# Patient Record
Sex: FEMALE | Race: WHITE | NOT HISPANIC OR LATINO | Employment: FULL TIME | URBAN - METROPOLITAN AREA
[De-identification: names, ages, dates, MRNs, and addresses within clinical notes are randomized per-mention and may not be internally consistent; named-entity substitution may affect disease eponyms.]

---

## 2023-03-12 PROBLEM — E66.811 CLASS 1 OBESITY WITH BODY MASS INDEX (BMI) OF 32.0 TO 32.9 IN ADULT: Status: ACTIVE | Noted: 2023-03-12

## 2023-03-12 PROBLEM — G43.909 MIGRAINE: Status: ACTIVE | Noted: 2023-03-12

## 2023-03-12 PROBLEM — S06.0X0A CONCUSSION WITH NO LOSS OF CONSCIOUSNESS: Status: ACTIVE | Noted: 2023-03-12

## 2023-03-12 PROBLEM — F41.9 ANXIETY: Status: ACTIVE | Noted: 2023-03-12

## 2023-03-12 PROBLEM — Z04.9 CONDITION NOT FOUND: Status: ACTIVE | Noted: 2023-03-12

## 2023-03-12 PROBLEM — E66.09 CLASS 1 OBESITY DUE TO EXCESS CALORIES WITHOUT SERIOUS COMORBIDITY WITH BODY MASS INDEX (BMI) OF 32.0 TO 32.9 IN ADULT: Status: ACTIVE | Noted: 2023-03-12

## 2023-03-12 PROBLEM — E66.811 CLASS 1 OBESITY DUE TO EXCESS CALORIES WITHOUT SERIOUS COMORBIDITY WITH BODY MASS INDEX (BMI) OF 32.0 TO 32.9 IN ADULT: Status: ACTIVE | Noted: 2023-03-12

## 2023-03-12 PROBLEM — E53.8 B12 DEFICIENCY: Status: ACTIVE | Noted: 2023-03-12

## 2023-03-12 PROBLEM — N95.9 POSTMENOPAUSAL SYMPTOMS: Status: ACTIVE | Noted: 2023-03-12

## 2023-03-12 PROBLEM — K21.9 GERD (GASTROESOPHAGEAL REFLUX DISEASE): Status: ACTIVE | Noted: 2023-03-12

## 2023-03-12 PROBLEM — R69 SICK: Status: ACTIVE | Noted: 2023-03-12

## 2023-03-12 PROBLEM — M54.2 NECK PAIN: Status: ACTIVE | Noted: 2023-03-12

## 2023-03-12 PROBLEM — E55.9 VITAMIN D DEFICIENCY: Status: ACTIVE | Noted: 2023-03-12

## 2023-03-12 PROBLEM — F90.0 ATTENTION DEFICIT HYPERACTIVITY DISORDER (ADHD), PREDOMINANTLY INATTENTIVE TYPE: Status: ACTIVE | Noted: 2023-03-12

## 2023-03-12 PROBLEM — M25.562 LEFT KNEE PAIN: Status: ACTIVE | Noted: 2023-03-12

## 2023-03-12 PROBLEM — R30.0 DYSURIA: Status: ACTIVE | Noted: 2023-03-12

## 2023-03-12 PROBLEM — L70.0 CLOSED COMEDONE: Status: ACTIVE | Noted: 2023-03-12

## 2023-03-12 PROBLEM — R41.3 MEMORY LOSS: Status: ACTIVE | Noted: 2023-03-12

## 2023-03-12 PROBLEM — G93.40 ENCEPHALOPATHY: Status: ACTIVE | Noted: 2023-03-12

## 2023-03-12 PROBLEM — R53.83 FATIGUE: Status: ACTIVE | Noted: 2023-03-12

## 2023-03-12 PROBLEM — M43.6 STIFFNESS OF CERVICAL SPINE: Status: ACTIVE | Noted: 2023-03-12

## 2023-03-12 PROBLEM — M22.2X2 PATELLOFEMORAL PAIN SYNDROME OF LEFT KNEE: Status: ACTIVE | Noted: 2023-03-12

## 2023-03-12 PROBLEM — E04.1 THYROID NODULE: Status: ACTIVE | Noted: 2023-03-12

## 2023-03-12 PROBLEM — B00.9 HERPES SIMPLEX: Status: ACTIVE | Noted: 2023-03-12

## 2023-03-12 PROBLEM — E66.9 CLASS 1 OBESITY WITH BODY MASS INDEX (BMI) OF 32.0 TO 32.9 IN ADULT: Status: ACTIVE | Noted: 2023-03-12

## 2023-03-12 RX ORDER — HYDROCHLOROTHIAZIDE 25 MG/1
1 TABLET ORAL DAILY
COMMUNITY
End: 2024-01-29 | Stop reason: SDUPTHER

## 2023-03-12 RX ORDER — ACYCLOVIR 400 MG/1
400 TABLET ORAL
COMMUNITY
Start: 2019-09-26 | End: 2024-04-13 | Stop reason: SDUPTHER

## 2023-03-12 RX ORDER — SEMAGLUTIDE 1 MG/.5ML
1 INJECTION, SOLUTION SUBCUTANEOUS
COMMUNITY
End: 2023-03-22 | Stop reason: DRUGHIGH

## 2023-03-12 RX ORDER — MELOXICAM 15 MG/1
15 TABLET ORAL DAILY PRN
COMMUNITY
End: 2023-09-19 | Stop reason: SDUPTHER

## 2023-03-12 RX ORDER — SEMAGLUTIDE 0.5 MG/.5ML
0.5 INJECTION, SOLUTION SUBCUTANEOUS
COMMUNITY
End: 2023-03-22 | Stop reason: DRUGHIGH

## 2023-03-12 RX ORDER — NEEDLES, SAFETY 18GX1 1/2"
NEEDLE, DISPOSABLE MISCELLANEOUS
COMMUNITY
End: 2024-01-29 | Stop reason: SDUPTHER

## 2023-03-12 RX ORDER — ACYCLOVIR 50 MG/G
1 OINTMENT TOPICAL 4 TIMES DAILY PRN
COMMUNITY
Start: 2021-06-04 | End: 2023-06-12

## 2023-03-12 RX ORDER — PANTOPRAZOLE SODIUM 20 MG/1
1 TABLET, DELAYED RELEASE ORAL DAILY
COMMUNITY
Start: 2021-09-07 | End: 2024-01-29 | Stop reason: SDUPTHER

## 2023-03-12 RX ORDER — ERGOCALCIFEROL 1.25 MG/1
1 CAPSULE ORAL
COMMUNITY
Start: 2022-04-19 | End: 2023-09-19 | Stop reason: ALTCHOICE

## 2023-03-21 LAB
C REACTIVE PROTEIN (MG/L) IN SER/PLAS: 0.56 MG/DL
CALCIDIOL (25 OH VITAMIN D3) (NG/ML) IN SER/PLAS: 33 NG/ML
COBALAMIN (VITAMIN B12) (PG/ML) IN SER/PLAS: 434 PG/ML (ref 211–911)

## 2023-03-22 ENCOUNTER — OFFICE VISIT (OUTPATIENT)
Dept: PRIMARY CARE | Facility: CLINIC | Age: 53
End: 2023-03-22
Payer: COMMERCIAL

## 2023-03-22 VITALS
OXYGEN SATURATION: 99 % | DIASTOLIC BLOOD PRESSURE: 85 MMHG | WEIGHT: 192.4 LBS | SYSTOLIC BLOOD PRESSURE: 127 MMHG | HEIGHT: 67 IN | TEMPERATURE: 97.1 F | HEART RATE: 68 BPM | RESPIRATION RATE: 16 BRPM | BODY MASS INDEX: 30.2 KG/M2

## 2023-03-22 DIAGNOSIS — E66.09 CLASS 1 OBESITY DUE TO EXCESS CALORIES WITHOUT SERIOUS COMORBIDITY WITH BODY MASS INDEX (BMI) OF 32.0 TO 32.9 IN ADULT: ICD-10-CM

## 2023-03-22 DIAGNOSIS — E53.8 B12 DEFICIENCY: Primary | ICD-10-CM

## 2023-03-22 DIAGNOSIS — E55.9 VITAMIN D DEFICIENCY: ICD-10-CM

## 2023-03-22 PROBLEM — R69 SICK: Status: RESOLVED | Noted: 2023-03-12 | Resolved: 2023-03-22

## 2023-03-22 PROBLEM — R53.83 FATIGUE: Status: RESOLVED | Noted: 2023-03-12 | Resolved: 2023-03-22

## 2023-03-22 PROBLEM — E66.9 CLASS 1 OBESITY WITH BODY MASS INDEX (BMI) OF 32.0 TO 32.9 IN ADULT: Status: RESOLVED | Noted: 2023-03-12 | Resolved: 2023-03-22

## 2023-03-22 PROBLEM — L70.0 CLOSED COMEDONE: Status: RESOLVED | Noted: 2023-03-12 | Resolved: 2023-03-22

## 2023-03-22 PROBLEM — R30.0 DYSURIA: Status: RESOLVED | Noted: 2023-03-12 | Resolved: 2023-03-22

## 2023-03-22 PROBLEM — Z04.9 CONDITION NOT FOUND: Status: RESOLVED | Noted: 2023-03-12 | Resolved: 2023-03-22

## 2023-03-22 PROBLEM — E66.811 CLASS 1 OBESITY WITH BODY MASS INDEX (BMI) OF 32.0 TO 32.9 IN ADULT: Status: RESOLVED | Noted: 2023-03-12 | Resolved: 2023-03-22

## 2023-03-22 PROCEDURE — 96372 THER/PROPH/DIAG INJ SC/IM: CPT | Performed by: FAMILY MEDICINE

## 2023-03-22 PROCEDURE — 3008F BODY MASS INDEX DOCD: CPT | Performed by: FAMILY MEDICINE

## 2023-03-22 PROCEDURE — 99214 OFFICE O/P EST MOD 30 MIN: CPT | Performed by: FAMILY MEDICINE

## 2023-03-22 PROCEDURE — 1036F TOBACCO NON-USER: CPT | Performed by: FAMILY MEDICINE

## 2023-03-22 RX ORDER — FLUCONAZOLE 150 MG/1
150 TABLET ORAL ONCE
Qty: 1 TABLET | Refills: 0 | Status: SHIPPED | OUTPATIENT
Start: 2023-03-22 | End: 2023-03-22

## 2023-03-22 RX ORDER — SEMAGLUTIDE 1 MG/.5ML
1 INJECTION, SOLUTION SUBCUTANEOUS
Qty: 6 ML | Refills: 3 | Status: SHIPPED | OUTPATIENT
Start: 2023-03-22 | End: 2023-08-01 | Stop reason: DRUGHIGH

## 2023-03-22 RX ORDER — CYANOCOBALAMIN 1000 UG/ML
1000 INJECTION, SOLUTION INTRAMUSCULAR; SUBCUTANEOUS ONCE
Status: COMPLETED | OUTPATIENT
Start: 2023-03-22 | End: 2023-03-22

## 2023-03-22 RX ADMIN — CYANOCOBALAMIN 1000 MCG: 1000 INJECTION, SOLUTION INTRAMUSCULAR; SUBCUTANEOUS at 15:13

## 2023-03-22 ASSESSMENT — ENCOUNTER SYMPTOMS
CONFUSION: 0
DIZZINESS: 0
NECK PAIN: 0
ARTHRALGIAS: 0
SORE THROAT: 0
DYSURIA: 0
SPEECH DIFFICULTY: 0
FREQUENCY: 0
SHORTNESS OF BREATH: 0
SINUS PRESSURE: 0
BACK PAIN: 0
ADENOPATHY: 0
DIARRHEA: 0
CHOKING: 0
PALPITATIONS: 0
JOINT SWELLING: 0
POLYDIPSIA: 0
FATIGUE: 0
WHEEZING: 0
BLOOD IN STOOL: 0
EYE ITCHING: 0
HEADACHES: 0
CHEST TIGHTNESS: 0
EYE DISCHARGE: 0
ACTIVITY CHANGE: 0
HALLUCINATIONS: 0
FACIAL ASYMMETRY: 0
ABDOMINAL PAIN: 0
FLANK PAIN: 0
EYE REDNESS: 0
MYALGIAS: 0
NUMBNESS: 0
NECK STIFFNESS: 0
EYE PAIN: 0
LIGHT-HEADEDNESS: 0
TREMORS: 0
NAUSEA: 0
UNEXPECTED WEIGHT CHANGE: 0
ABDOMINAL DISTENTION: 0
SLEEP DISTURBANCE: 0
VOMITING: 0
SEIZURES: 0
RHINORRHEA: 0
FEVER: 0
WEAKNESS: 0
WOUND: 0
NERVOUS/ANXIOUS: 0
AGITATION: 0
PHOTOPHOBIA: 0
TROUBLE SWALLOWING: 0
CHILLS: 0
DYSPHORIC MOOD: 0
COUGH: 0
BRUISES/BLEEDS EASILY: 0
HEMATURIA: 0
APPETITE CHANGE: 0
DIAPHORESIS: 0
VOICE CHANGE: 0
CONSTIPATION: 0

## 2023-03-22 NOTE — PROGRESS NOTES
Subjective   Patient ID: Chyna Lorenzo is a 52 y.o. female who presents for Follow-up (Review labs ).    Patient comes into the office today for follow-up on her vitamin labs.  She has had documented vitamin D and B12 deficiencies causing issues with memory/forgetfulness/sluggishness etc.  She reports symptomatology is improving and she has been maintaining vitamin D orally and B12 injections as recommended.  She would also like a refill for her Wegovy.  She would also like to have some Diflucan on hand for yeast infections.  She denies any issues with nausea, vomiting, diarrhea, fever, chills, shortness of breath or chest pain.         Review of Systems   Constitutional:  Negative for activity change, appetite change, chills, diaphoresis, fatigue, fever and unexpected weight change.   HENT:  Negative for congestion, ear pain, hearing loss, nosebleeds, postnasal drip, rhinorrhea, sinus pressure, sneezing, sore throat, tinnitus, trouble swallowing and voice change.    Eyes:  Negative for photophobia, pain, discharge, redness, itching and visual disturbance.   Respiratory:  Negative for cough, choking, chest tightness, shortness of breath and wheezing.    Cardiovascular:  Negative for chest pain, palpitations and leg swelling.   Gastrointestinal:  Negative for abdominal distention, abdominal pain, blood in stool, constipation, diarrhea, nausea and vomiting.   Endocrine: Negative for cold intolerance, heat intolerance, polydipsia and polyuria.   Genitourinary:  Negative for dysuria, flank pain, frequency, hematuria and urgency.   Musculoskeletal:  Negative for arthralgias, back pain, joint swelling, myalgias, neck pain and neck stiffness.   Skin:  Negative for rash and wound.   Allergic/Immunologic: Negative for immunocompromised state.   Neurological:  Negative for dizziness, tremors, seizures, syncope, facial asymmetry, speech difficulty, weakness, light-headedness, numbness and headaches.   Hematological:  Negative  "for adenopathy. Does not bruise/bleed easily.   Psychiatric/Behavioral:  Negative for agitation, behavioral problems, confusion, dysphoric mood, hallucinations, self-injury, sleep disturbance and suicidal ideas. The patient is not nervous/anxious.        Objective   /85 (BP Location: Left arm, Patient Position: Sitting)   Pulse 68   Temp 36.2 °C (97.1 °F)   Resp 16   Ht 1.702 m (5' 7\")   Wt 87.3 kg (192 lb 6.4 oz)   SpO2 99%   BMI 30.13 kg/m²     Physical Exam  Constitutional:       General: She is not in acute distress.     Appearance: She is not ill-appearing or diaphoretic.   HENT:      Head: Normocephalic and atraumatic.      Right Ear: External ear normal.      Left Ear: External ear normal.      Nose: Nose normal. No rhinorrhea.   Eyes:      General: Lids are normal. No scleral icterus.        Right eye: No discharge.         Left eye: No discharge.      Conjunctiva/sclera: Conjunctivae normal.   Cardiovascular:      Rate and Rhythm: Normal rate and regular rhythm.      Pulses: Normal pulses.      Heart sounds: No murmur heard.  Pulmonary:      Effort: Pulmonary effort is normal. No respiratory distress.      Breath sounds: No decreased breath sounds, wheezing, rhonchi or rales.   Abdominal:      General: Bowel sounds are normal. There is no distension.      Palpations: Abdomen is soft. There is no mass.      Tenderness: There is no abdominal tenderness. There is no guarding or rebound.   Musculoskeletal:         General: No swelling, tenderness or deformity.      Cervical back: No rigidity or tenderness.      Right lower leg: No edema.      Left lower leg: No edema.   Lymphadenopathy:      Cervical: No cervical adenopathy.      Upper Body:      Right upper body: No supraclavicular adenopathy.      Left upper body: No supraclavicular adenopathy.   Skin:     General: Skin is warm and dry.      Coloration: Skin is not jaundiced or pale.      Findings: No erythema, lesion or rash.   Neurological:     "  General: No focal deficit present.      Mental Status: She is alert and oriented to person, place, and time.      Sensory: No sensory deficit.      Motor: No weakness or tremor.      Coordination: Coordination normal.      Gait: Gait normal.   Psychiatric:         Mood and Affect: Mood normal. Affect is not inappropriate.         Behavior: Behavior normal.         Assessment/Plan   Diagnoses and all orders for this visit:  B12 deficiency  -     cyanocobalamin (Vitamin B-12) injection 1,000 mcg  -     CBC and Auto Differential; Future  -     Vitamin B12; Future  -     C-Reactive Protein; Future  -     Follow Up In [facility] - PCP; Future  Class 1 obesity due to excess calories without serious comorbidity with body mass index (BMI) of 32.0 to 32.9 in adult  -     semaglutide, weight loss, (Wegovy) 1 mg/0.5 mL pen injector; Inject 1 mg under the skin every 7 days.  -     CBC and Auto Differential; Future  -     Comprehensive Metabolic Panel; Future  -     Lipid Panel; Future  -     Magnesium; Future  -     TSH with reflex to Free T4 if abnormal; Future  -     Follow Up In [facility] - PCP; Future  -     fluconazole (Diflucan) 150 mg tablet; Take 1 tablet (150 mg) by mouth 1 time for 1 dose.  Vitamin D deficiency  -     Comprehensive Metabolic Panel; Future  -     Vitamin D, Total; Future  -     Follow Up In [facility] - PCP; Future

## 2023-04-17 ENCOUNTER — TELEPHONE (OUTPATIENT)
Dept: PRIMARY CARE | Facility: CLINIC | Age: 53
End: 2023-04-17
Payer: COMMERCIAL

## 2023-04-19 ENCOUNTER — TELEPHONE (OUTPATIENT)
Dept: PHARMACY | Facility: HOSPITAL | Age: 53
End: 2023-04-19
Payer: COMMERCIAL

## 2023-04-19 NOTE — TELEPHONE ENCOUNTER
Prior Authorization    Prior authorization approved for [employer] to notify of approval. Patient is to call pharmacy to notify of prior authorization approval. If additional assistance is needed, advised patient to call [facility] at [phone].    Please reach out to clinical pharmacy team with questions/concerns.    Thank you,  Lorenza Orellana, PharmD

## 2023-06-11 DIAGNOSIS — B00.9 HERPES SIMPLEX: Primary | ICD-10-CM

## 2023-06-12 RX ORDER — ACYCLOVIR 50 MG/G
OINTMENT TOPICAL
Qty: 20 G | Refills: 0 | Status: SHIPPED | OUTPATIENT
Start: 2023-06-12

## 2023-08-01 ENCOUNTER — TELEPHONE (OUTPATIENT)
Dept: PRIMARY CARE | Facility: CLINIC | Age: 53
End: 2023-08-01
Payer: COMMERCIAL

## 2023-08-01 DIAGNOSIS — E66.09 CLASS 1 OBESITY DUE TO EXCESS CALORIES WITHOUT SERIOUS COMORBIDITY WITH BODY MASS INDEX (BMI) OF 30.0 TO 30.9 IN ADULT: Primary | ICD-10-CM

## 2023-08-01 RX ORDER — SEMAGLUTIDE 1.7 MG/.75ML
1.7 INJECTION, SOLUTION SUBCUTANEOUS
Qty: 3 ML | Refills: 5 | Status: SHIPPED | OUTPATIENT
Start: 2023-08-01 | End: 2023-09-19 | Stop reason: SDUPTHER

## 2023-09-18 ENCOUNTER — LAB (OUTPATIENT)
Dept: LAB | Facility: LAB | Age: 53
End: 2023-09-18
Payer: COMMERCIAL

## 2023-09-18 DIAGNOSIS — E66.09 CLASS 1 OBESITY DUE TO EXCESS CALORIES WITHOUT SERIOUS COMORBIDITY WITH BODY MASS INDEX (BMI) OF 32.0 TO 32.9 IN ADULT: ICD-10-CM

## 2023-09-18 DIAGNOSIS — E55.9 VITAMIN D DEFICIENCY: ICD-10-CM

## 2023-09-18 DIAGNOSIS — E53.8 B12 DEFICIENCY: ICD-10-CM

## 2023-09-18 LAB
ALANINE AMINOTRANSFERASE (SGPT) (U/L) IN SER/PLAS: 16 U/L (ref 7–45)
ALBUMIN (G/DL) IN SER/PLAS: 4.2 G/DL (ref 3.4–5)
ALKALINE PHOSPHATASE (U/L) IN SER/PLAS: 45 U/L (ref 33–110)
ANION GAP IN SER/PLAS: 13 MMOL/L (ref 10–20)
ASPARTATE AMINOTRANSFERASE (SGOT) (U/L) IN SER/PLAS: 19 U/L (ref 9–39)
BASOPHILS (10*3/UL) IN BLOOD BY AUTOMATED COUNT: 0.04 X10E9/L (ref 0–0.1)
BASOPHILS/100 LEUKOCYTES IN BLOOD BY AUTOMATED COUNT: 0.8 % (ref 0–2)
BILIRUBIN TOTAL (MG/DL) IN SER/PLAS: 0.3 MG/DL (ref 0–1.2)
C REACTIVE PROTEIN (MG/L) IN SER/PLAS: 0.51 MG/DL
CALCIDIOL (25 OH VITAMIN D3) (NG/ML) IN SER/PLAS: 41 NG/ML
CALCIUM (MG/DL) IN SER/PLAS: 9.6 MG/DL (ref 8.6–10.3)
CARBON DIOXIDE, TOTAL (MMOL/L) IN SER/PLAS: 29 MMOL/L (ref 21–32)
CHLORIDE (MMOL/L) IN SER/PLAS: 103 MMOL/L (ref 98–107)
CHOLESTEROL (MG/DL) IN SER/PLAS: 198 MG/DL (ref 0–199)
CHOLESTEROL IN HDL (MG/DL) IN SER/PLAS: 47.6 MG/DL
CHOLESTEROL/HDL RATIO: 4.2
COBALAMIN (VITAMIN B12) (PG/ML) IN SER/PLAS: 344 PG/ML (ref 211–911)
CREATININE (MG/DL) IN SER/PLAS: 0.79 MG/DL (ref 0.5–1.05)
EOSINOPHILS (10*3/UL) IN BLOOD BY AUTOMATED COUNT: 0.09 X10E9/L (ref 0–0.7)
EOSINOPHILS/100 LEUKOCYTES IN BLOOD BY AUTOMATED COUNT: 1.8 % (ref 0–6)
ERYTHROCYTE DISTRIBUTION WIDTH (RATIO) BY AUTOMATED COUNT: 12.4 % (ref 11.5–14.5)
ERYTHROCYTE MEAN CORPUSCULAR HEMOGLOBIN CONCENTRATION (G/DL) BY AUTOMATED: 33 G/DL (ref 32–36)
ERYTHROCYTE MEAN CORPUSCULAR VOLUME (FL) BY AUTOMATED COUNT: 93 FL (ref 80–100)
ERYTHROCYTES (10*6/UL) IN BLOOD BY AUTOMATED COUNT: 4.1 X10E12/L (ref 4–5.2)
GFR FEMALE: 89 ML/MIN/1.73M2
GLUCOSE (MG/DL) IN SER/PLAS: 85 MG/DL (ref 74–99)
HEMATOCRIT (%) IN BLOOD BY AUTOMATED COUNT: 38.2 % (ref 36–46)
HEMOGLOBIN (G/DL) IN BLOOD: 12.6 G/DL (ref 12–16)
IMMATURE GRANULOCYTES/100 LEUKOCYTES IN BLOOD BY AUTOMATED COUNT: 0.2 % (ref 0–0.9)
LDL: 130 MG/DL (ref 0–99)
LEUKOCYTES (10*3/UL) IN BLOOD BY AUTOMATED COUNT: 5.1 X10E9/L (ref 4.4–11.3)
LYMPHOCYTES (10*3/UL) IN BLOOD BY AUTOMATED COUNT: 2.22 X10E9/L (ref 1.2–4.8)
LYMPHOCYTES/100 LEUKOCYTES IN BLOOD BY AUTOMATED COUNT: 43.6 % (ref 13–44)
MAGNESIUM (MG/DL) IN SER/PLAS: 1.78 MG/DL (ref 1.6–2.4)
MONOCYTES (10*3/UL) IN BLOOD BY AUTOMATED COUNT: 0.42 X10E9/L (ref 0.1–1)
MONOCYTES/100 LEUKOCYTES IN BLOOD BY AUTOMATED COUNT: 8.3 % (ref 2–10)
NEUTROPHILS (10*3/UL) IN BLOOD BY AUTOMATED COUNT: 2.31 X10E9/L (ref 1.2–7.7)
NEUTROPHILS/100 LEUKOCYTES IN BLOOD BY AUTOMATED COUNT: 45.3 % (ref 40–80)
PLATELETS (10*3/UL) IN BLOOD AUTOMATED COUNT: 296 X10E9/L (ref 150–450)
POTASSIUM (MMOL/L) IN SER/PLAS: 4 MMOL/L (ref 3.5–5.3)
PROTEIN TOTAL: 6.3 G/DL (ref 6.4–8.2)
SODIUM (MMOL/L) IN SER/PLAS: 141 MMOL/L (ref 136–145)
THYROTROPIN (MIU/L) IN SER/PLAS BY DETECTION LIMIT <= 0.05 MIU/L: 2.67 MIU/L (ref 0.44–3.98)
TRIGLYCERIDE (MG/DL) IN SER/PLAS: 101 MG/DL (ref 0–149)
UREA NITROGEN (MG/DL) IN SER/PLAS: 15 MG/DL (ref 6–23)
VLDL: 20 MG/DL (ref 0–40)

## 2023-09-18 PROCEDURE — 83735 ASSAY OF MAGNESIUM: CPT

## 2023-09-18 PROCEDURE — 86140 C-REACTIVE PROTEIN: CPT

## 2023-09-18 PROCEDURE — 82306 VITAMIN D 25 HYDROXY: CPT

## 2023-09-18 PROCEDURE — 36415 COLL VENOUS BLD VENIPUNCTURE: CPT

## 2023-09-18 PROCEDURE — 80061 LIPID PANEL: CPT

## 2023-09-18 PROCEDURE — 85025 COMPLETE CBC W/AUTO DIFF WBC: CPT

## 2023-09-18 PROCEDURE — 80053 COMPREHEN METABOLIC PANEL: CPT

## 2023-09-18 PROCEDURE — 82607 VITAMIN B-12: CPT

## 2023-09-18 PROCEDURE — 84443 ASSAY THYROID STIM HORMONE: CPT

## 2023-09-19 ENCOUNTER — OFFICE VISIT (OUTPATIENT)
Dept: PRIMARY CARE | Facility: CLINIC | Age: 53
End: 2023-09-19
Payer: COMMERCIAL

## 2023-09-19 VITALS
BODY MASS INDEX: 26.84 KG/M2 | RESPIRATION RATE: 18 BRPM | OXYGEN SATURATION: 98 % | WEIGHT: 171 LBS | SYSTOLIC BLOOD PRESSURE: 106 MMHG | TEMPERATURE: 97.9 F | HEIGHT: 67 IN | HEART RATE: 73 BPM | DIASTOLIC BLOOD PRESSURE: 73 MMHG

## 2023-09-19 DIAGNOSIS — E53.8 B12 DEFICIENCY: ICD-10-CM

## 2023-09-19 DIAGNOSIS — Z23 ENCOUNTER FOR IMMUNIZATION: ICD-10-CM

## 2023-09-19 DIAGNOSIS — M79.672 FOOT PAIN, BILATERAL: ICD-10-CM

## 2023-09-19 DIAGNOSIS — N95.1 MENOPAUSAL VASOMOTOR SYNDROME: ICD-10-CM

## 2023-09-19 DIAGNOSIS — Z12.31 ENCOUNTER FOR SCREENING MAMMOGRAM FOR MALIGNANT NEOPLASM OF BREAST: ICD-10-CM

## 2023-09-19 DIAGNOSIS — M79.671 FOOT PAIN, BILATERAL: ICD-10-CM

## 2023-09-19 DIAGNOSIS — M22.2X2 PATELLOFEMORAL PAIN SYNDROME OF LEFT KNEE: Primary | ICD-10-CM

## 2023-09-19 DIAGNOSIS — E55.9 VITAMIN D DEFICIENCY: ICD-10-CM

## 2023-09-19 DIAGNOSIS — E66.3 OVERWEIGHT WITH BODY MASS INDEX (BMI) OF 26 TO 26.9 IN ADULT: ICD-10-CM

## 2023-09-19 DIAGNOSIS — K58.1 IRRITABLE BOWEL SYNDROME WITH CONSTIPATION: ICD-10-CM

## 2023-09-19 PROCEDURE — 90686 IIV4 VACC NO PRSV 0.5 ML IM: CPT | Performed by: FAMILY MEDICINE

## 2023-09-19 PROCEDURE — 90471 IMMUNIZATION ADMIN: CPT | Performed by: FAMILY MEDICINE

## 2023-09-19 PROCEDURE — 1036F TOBACCO NON-USER: CPT | Performed by: FAMILY MEDICINE

## 2023-09-19 PROCEDURE — 99396 PREV VISIT EST AGE 40-64: CPT | Performed by: FAMILY MEDICINE

## 2023-09-19 PROCEDURE — 3008F BODY MASS INDEX DOCD: CPT | Performed by: FAMILY MEDICINE

## 2023-09-19 RX ORDER — SEMAGLUTIDE 1.7 MG/.75ML
1.7 INJECTION, SOLUTION SUBCUTANEOUS
Qty: 9 ML | Refills: 3 | Status: SHIPPED | OUTPATIENT
Start: 2023-09-19 | End: 2023-10-20 | Stop reason: DRUGHIGH

## 2023-09-19 RX ORDER — LANOLIN ALCOHOL/MO/W.PET/CERES
400 CREAM (GRAM) TOPICAL DAILY
Qty: 30 TABLET | Refills: 11 | Status: SHIPPED | OUTPATIENT
Start: 2023-09-19 | End: 2024-09-18

## 2023-09-19 RX ORDER — CHOLECALCIFEROL (VITAMIN D3) 25 MCG
25 TABLET ORAL DAILY
COMMUNITY

## 2023-09-19 RX ORDER — CYANOCOBALAMIN 1000 UG/ML
1000 INJECTION, SOLUTION INTRAMUSCULAR; SUBCUTANEOUS ONCE
Status: SHIPPED | OUTPATIENT
Start: 2023-09-19

## 2023-09-19 RX ORDER — MELOXICAM 15 MG/1
15 TABLET ORAL DAILY PRN
Qty: 90 TABLET | Refills: 3 | Status: SHIPPED | OUTPATIENT
Start: 2023-09-19 | End: 2024-01-29 | Stop reason: SDUPTHER

## 2023-09-19 ASSESSMENT — ENCOUNTER SYMPTOMS
FREQUENCY: 0
ABDOMINAL DISTENTION: 0
ABDOMINAL PAIN: 0
CONFUSION: 0
PALPITATIONS: 0
ACTIVITY CHANGE: 0
FEVER: 0
PHOTOPHOBIA: 0
CONSTIPATION: 0
TROUBLE SWALLOWING: 0
WHEEZING: 0
CHILLS: 0
DIARRHEA: 0
SEIZURES: 0
CHEST TIGHTNESS: 0
ADENOPATHY: 0
VOMITING: 0
APPETITE CHANGE: 0
BRUISES/BLEEDS EASILY: 0
DIAPHORESIS: 0
WEAKNESS: 0
BACK PAIN: 0
DYSPHORIC MOOD: 0
NERVOUS/ANXIOUS: 0
BLOOD IN STOOL: 0
SLEEP DISTURBANCE: 0
VOICE CHANGE: 0
NAUSEA: 0
EYE REDNESS: 0
HEADACHES: 0
EYE ITCHING: 0
HEMATURIA: 0
RHINORRHEA: 0
UNEXPECTED WEIGHT CHANGE: 0
COUGH: 0
DIZZINESS: 0
POLYDIPSIA: 0
FACIAL ASYMMETRY: 0
DYSURIA: 0
NECK PAIN: 0
SORE THROAT: 0
FLANK PAIN: 0
EYE DISCHARGE: 0
CHOKING: 0
MYALGIAS: 0
WOUND: 0
SHORTNESS OF BREATH: 0
FATIGUE: 0
AGITATION: 0
EYE PAIN: 0
TREMORS: 0
NUMBNESS: 0
ARTHRALGIAS: 1
JOINT SWELLING: 0
SINUS PRESSURE: 0
SPEECH DIFFICULTY: 0
HALLUCINATIONS: 0
NECK STIFFNESS: 0
LIGHT-HEADEDNESS: 0

## 2023-09-19 ASSESSMENT — PATIENT HEALTH QUESTIONNAIRE - PHQ9
1. LITTLE INTEREST OR PLEASURE IN DOING THINGS: NOT AT ALL
SUM OF ALL RESPONSES TO PHQ9 QUESTIONS 1 AND 2: 0
2. FEELING DOWN, DEPRESSED OR HOPELESS: NOT AT ALL

## 2023-09-19 NOTE — PATIENT INSTRUCTIONS
BMI was above normal measurement. Current weight: 77.6 kg (171 lb)  Weight change since last visit (-) denotes wt loss -21.4 lbs   Weight loss needed to achieve BMI 25: 11.7 Lbs  Weight loss needed to achieve BMI 30: -20.1 Lbs  Provided instructions on dietary changes  Provided instructions on exercise  Advised to Increase physical activity.

## 2023-09-19 NOTE — PROGRESS NOTES
Subjective   Patient ID: Chyna Lorenzo is a 53 y.o. female who presents for Annual Exam (And review labs ).    Subjective  Chyna Lorenzo is a 53 y.o. female and is here for a comprehensive physical exam. The patient reports complaining of postmenopausal hot flashes.  Is wondering if she can get estrogen replacement filled.  She was getting through her gynecologist who is only given her a few patches at a time.  She has been doing very well with weight loss with the semaglutide would like to maintain the same dose is not having any problems to monitor her B12 and D deficiency.  She needs refills on medication for her IBS, weight loss, knee pain and foot pain.  Is also due for flu vaccine and would like a referral to podiatry as she cannot afford to purchase custom billed shoe inserts on her own.    Do you take any herbs or supplements that were not prescribed by a doctor? no  Are you taking calcium supplements? no  Are you taking aspirin daily? no      History:  LMP: No LMP recorded. Patient is postmenopausal.            Review of Systems   Constitutional:  Negative for activity change, appetite change, chills, diaphoresis, fatigue, fever and unexpected weight change.   HENT:  Negative for congestion, ear pain, hearing loss, nosebleeds, postnasal drip, rhinorrhea, sinus pressure, sneezing, sore throat, tinnitus, trouble swallowing and voice change.    Eyes:  Negative for photophobia, pain, discharge, redness, itching and visual disturbance.   Respiratory:  Negative for cough, choking, chest tightness, shortness of breath and wheezing.    Cardiovascular:  Negative for chest pain, palpitations and leg swelling.   Gastrointestinal:  Negative for abdominal distention, abdominal pain, blood in stool, constipation, diarrhea, nausea and vomiting.   Endocrine: Negative for cold intolerance, heat intolerance, polydipsia and polyuria.   Genitourinary:  Negative for dysuria, flank pain, frequency, hematuria and urgency.  "  Musculoskeletal:  Positive for arthralgias. Negative for back pain, joint swelling, myalgias, neck pain and neck stiffness.        Bilateral foot pain   Skin:  Negative for rash and wound.   Allergic/Immunologic: Negative for immunocompromised state.   Neurological:  Negative for dizziness, tremors, seizures, syncope, facial asymmetry, speech difficulty, weakness, light-headedness, numbness and headaches.   Hematological:  Negative for adenopathy. Does not bruise/bleed easily.   Psychiatric/Behavioral:  Negative for agitation, behavioral problems, confusion, dysphoric mood, hallucinations, self-injury, sleep disturbance and suicidal ideas. The patient is not nervous/anxious.        Objective   /73 (BP Location: Right arm, Patient Position: Sitting, BP Cuff Size: Large adult)   Pulse 73   Temp 36.6 °C (97.9 °F) (Temporal)   Resp 18   Ht 1.702 m (5' 7\")   Wt 77.6 kg (171 lb)   SpO2 98%   BMI 26.78 kg/m²     Physical Exam  Constitutional:       General: She is not in acute distress.     Appearance: She is not ill-appearing or diaphoretic.   HENT:      Head: Normocephalic and atraumatic.      Right Ear: External ear normal.      Left Ear: External ear normal.      Nose: Nose normal. No rhinorrhea.   Eyes:      General: Lids are normal. No scleral icterus.        Right eye: No discharge.         Left eye: No discharge.      Conjunctiva/sclera: Conjunctivae normal.   Cardiovascular:      Rate and Rhythm: Normal rate and regular rhythm.      Pulses: Normal pulses.      Heart sounds: No murmur heard.  Pulmonary:      Effort: Pulmonary effort is normal. No respiratory distress.      Breath sounds: No decreased breath sounds, wheezing, rhonchi or rales.   Abdominal:      General: Bowel sounds are normal. There is no distension.      Palpations: Abdomen is soft. There is no mass.      Tenderness: There is no abdominal tenderness. There is no guarding or rebound.   Musculoskeletal:         General: No swelling, " tenderness or deformity.      Cervical back: No rigidity or tenderness.      Right lower leg: No edema.      Left lower leg: No edema.   Lymphadenopathy:      Cervical: No cervical adenopathy.      Upper Body:      Right upper body: No supraclavicular adenopathy.      Left upper body: No supraclavicular adenopathy.   Skin:     General: Skin is warm and dry.      Coloration: Skin is not jaundiced or pale.      Findings: No erythema, lesion or rash.   Neurological:      General: No focal deficit present.      Mental Status: She is alert and oriented to person, place, and time.      Sensory: No sensory deficit.      Motor: No weakness or tremor.      Coordination: Coordination normal.      Gait: Gait normal.   Psychiatric:         Mood and Affect: Mood normal. Affect is not inappropriate.         Behavior: Behavior normal.         Assessment/Plan   Diagnoses and all orders for this visit:  Patellofemoral pain syndrome of left knee  -     meloxicam (Mobic) 15 mg tablet; Take 1 tablet (15 mg) by mouth once daily as needed for mild pain (1 - 3).  -     Follow Up In [facility] - PCP - Established; Future  B12 deficiency  -     Follow Up In [facility] - PCP  -     Follow Up In [facility] - PCP - Established; Future  -     CBC and Auto Differential; Future  -     TSH with reflex to Free T4 if abnormal; Future  -     Vitamin B12; Future  -     cyanocobalamin (Vitamin B-12) injection 1,000 mcg  Vitamin D deficiency  -     Follow Up In [facility] - PCP  -     Follow Up In [facility] - PCP - Established; Future  -     Comprehensive Metabolic Panel; Future  -     Magnesium; Future  -     TSH with reflex to Free T4 if abnormal; Future  -     Vitamin D 25-Hydroxy,Total (for eval of Vitamin D levels); Future  Irritable bowel syndrome with constipation  -     linaCLOtide (Linzess) 145 mcg capsule; Take 1 capsule (145 mcg) by mouth once daily.  -     magnesium oxide (Mag-Ox) 400  mg (241.3 mg magnesium) tablet; Take 1 tablet (400 mg) by mouth once daily.  -     Follow Up In [facility] - PCP - Established; Future  Encounter for screening mammogram for malignant neoplasm of breast  -     BI mammo bilateral screening tomosynthesis; Future  -     Follow Up In [facility] - PCP - Established; Future  Encounter for immunization  -     Flu vaccine (IIV4) age 6 months and greater, preservative free  -     Follow Up In [facility] - PCP - Established; Future  Overweight with body mass index (BMI) of 26 to 26.9 in adult  -     semaglutide, weight loss, (Wegovy) 1.7 mg/0.75 mL pen injector; Inject 1.7 mg under the skin 1 (one) time per week.  -     Follow Up In [facility] - PCP - Established; Future  Menopausal vasomotor syndrome  -     Follow Up In [facility] - PCP - Established; Future  -     CBC and Auto Differential; Future  -     Comprehensive Metabolic Panel; Future  -     Lipid Panel; Future  -     Magnesium; Future  -     TSH with reflex to Free T4 if abnormal; Future  -     fezolinetant 45 mg tablet; Take 45 mg by mouth once daily.  Foot pain, bilateral  -     Referral to Podiatry; Future  1. Patient Counseling:  --Nutrition: Stressed importance of moderation in sodium/caffeine intake, saturated fat and cholesterol, caloric balance, sufficient intake of fresh fruits, vegetables, fiber, calcium, iron,.  --Exercise: Stressed the importance of regular exercise.   --Substance Abuse: Discussed cessation/primary prevention of tobacco, alcohol, or other drug use; driving or other dangerous activities under the influence; availability of treatment for abuse.   --Injury prevention: Discussed safety belts, safety helmets, smoke detector, smoking near bedding or upholstery.   --Dental health: Discussed importance of regular tooth brushing, flossing, and dental visits.  --Immunizations reviewed.  --Discussed benefits of screening colonoscopy.  Will be  due for repeat in 2024  3. Discussed the patient's BMI with her.  The BMI is in the acceptable range.  4. Follow up 1 year with labs

## 2023-10-03 ENCOUNTER — ANCILLARY PROCEDURE (OUTPATIENT)
Dept: RADIOLOGY | Facility: CLINIC | Age: 53
End: 2023-10-03
Payer: COMMERCIAL

## 2023-10-03 DIAGNOSIS — Z12.31 ENCOUNTER FOR SCREENING MAMMOGRAM FOR MALIGNANT NEOPLASM OF BREAST: ICD-10-CM

## 2023-10-03 PROCEDURE — 77063 BREAST TOMOSYNTHESIS BI: CPT

## 2023-10-03 PROCEDURE — 77063 BREAST TOMOSYNTHESIS BI: CPT | Mod: BILATERAL PROCEDURE | Performed by: RADIOLOGY

## 2023-10-03 PROCEDURE — 77067 SCR MAMMO BI INCL CAD: CPT | Mod: BILATERAL PROCEDURE | Performed by: RADIOLOGY

## 2023-10-12 ENCOUNTER — TELEPHONE (OUTPATIENT)
Dept: PRIMARY CARE | Facility: CLINIC | Age: 53
End: 2023-10-12
Payer: COMMERCIAL

## 2023-10-12 NOTE — TELEPHONE ENCOUNTER
Patient was not able to get Veozah; must try and fail 2 preferred hormone therapy products:  Estradiol gel, transdermal patch or oral tablet, Mimvey, Amabelz etc. (See scanned document in Epic for additional aletrnatives)    Patient reports having tried Estradiol patch with no benefits

## 2023-10-18 DIAGNOSIS — E66.3 OVERWEIGHT WITH BODY MASS INDEX (BMI) OF 26 TO 26.9 IN ADULT: ICD-10-CM

## 2023-10-20 RX ORDER — SEMAGLUTIDE 1.7 MG/.75ML
2.4 INJECTION, SOLUTION SUBCUTANEOUS
Qty: 0.75 ML | Refills: 3 | Status: CANCELLED | OUTPATIENT
Start: 2023-10-20 | End: 2024-10-19

## 2023-10-27 ENCOUNTER — TELEPHONE (OUTPATIENT)
Dept: PRIMARY CARE | Facility: CLINIC | Age: 53
End: 2023-10-27
Payer: COMMERCIAL

## 2023-10-30 ENCOUNTER — OFFICE VISIT (OUTPATIENT)
Dept: ORTHOPEDIC SURGERY | Facility: CLINIC | Age: 53
End: 2023-10-30
Payer: COMMERCIAL

## 2023-10-30 DIAGNOSIS — M17.0 PRIMARY OSTEOARTHRITIS OF BOTH KNEES: Primary | ICD-10-CM

## 2023-10-30 PROCEDURE — 99213 OFFICE O/P EST LOW 20 MIN: CPT | Performed by: ORTHOPAEDIC SURGERY

## 2023-10-30 PROCEDURE — 3008F BODY MASS INDEX DOCD: CPT | Performed by: ORTHOPAEDIC SURGERY

## 2023-10-30 PROCEDURE — 1036F TOBACCO NON-USER: CPT | Performed by: ORTHOPAEDIC SURGERY

## 2023-10-30 NOTE — PROGRESS NOTES
History of present illness: Patient with a history of severe patellofemoral knee arthritis she had x-rays in August 3, 2023 bone-on-bone osteophyte formation complete loss of the joint space her arthritis is uniquely and significantly patellofemoral    Physical exam:    General: No acute distress or breathing difficulty or discomfort, pleasant and cooperative with the examination.    Extremities: The affected left knee was examined and inspected and was tender to touch along the medial and lateral aspect with catching, locking or mechanical symptoms.    The skin was intact without breakdown or open wound.  Old incisions of present were healed.    There was a mild Jeffrey exam seen with some evidence of instability and weakness in the collateral ligaments with varus valgus stressing and laxity in the anterior or posterior planes.    There was a negative Lachman's test pivot shift test and posterior drawer sign with no foot drop, numbness or tingling.    Sensation, reflexes and pulses in the foot and ankle are preserved.  There was an effusion.  Range of motion showed good straight leg raise with flexion to 150 degrees and extension to 0 degrees.  The patient had the ability to bear weight but with discomfort.  The patient's gait was antalgic secondary to discomfort    The affected right knee was examined and inspected and was tender to touch along the medial and lateral aspect with catching, locking or mechanical symptoms.    The skin was intact without breakdown or open wound.  Old incisions of present were healed.    There was a mild Jeffrey exam seen with some evidence of instability and weakness in the collateral ligaments with varus valgus stressing and laxity in the anterior or posterior planes.    There was a negative Lachman's test pivot shift test and posterior drawer sign with no foot drop, numbness or tingling.    Sensation, reflexes and pulses in the foot and ankle are preserved.  There was an effusion.   Range of motion showed good straight leg raise with flexion to 150 degrees and extension to 0 degrees.  The patient had the ability to bear weight but with discomfort.  The patient's gait was antalgic secondary to discomfort        Diagnostic studies: No new x-rays today we did review her x-rays from August 3, 2023    Impression: Bilateral knee severe patellofemoral arthrosis    Plan: Patient has already had multiple years of cortisone multiple rounds of cortisone she had therapy PT conditioning rehab program    She is tried knee sleeves bracing and other alternatives    Anti-inflammatories    At this point she is exhausted conservative measures is medically necessary for her to try all alternatives  to joint replacement at such a young age at 53 years of age, this includes gel injection series which will be ordered for both knees

## 2023-10-31 ENCOUNTER — TELEPHONE (OUTPATIENT)
Dept: PRIMARY CARE | Facility: CLINIC | Age: 53
End: 2023-10-31
Payer: COMMERCIAL

## 2023-11-28 ENCOUNTER — TELEPHONE (OUTPATIENT)
Dept: PRIMARY CARE | Facility: CLINIC | Age: 53
End: 2023-11-28

## 2023-11-28 NOTE — TELEPHONE ENCOUNTER
Lorenza, are you able to follow up on this appeal - I looked in you bin and did not see an approval letter.        Wegovy prior authorization assistance requested (patient has already started this medication).    Patient phoned the office requesting our office perform a peer review.  Are you able to help assist with this patients needs?

## 2024-01-18 ENCOUNTER — OFFICE VISIT (OUTPATIENT)
Dept: PRIMARY CARE | Facility: CLINIC | Age: 54
End: 2024-01-18
Payer: COMMERCIAL

## 2024-01-18 VITALS
TEMPERATURE: 98.4 F | WEIGHT: 176 LBS | RESPIRATION RATE: 19 BRPM | DIASTOLIC BLOOD PRESSURE: 78 MMHG | BODY MASS INDEX: 27.57 KG/M2 | OXYGEN SATURATION: 98 % | SYSTOLIC BLOOD PRESSURE: 134 MMHG | HEART RATE: 135 BPM

## 2024-01-18 DIAGNOSIS — K52.9 GASTROENTERITIS: Primary | ICD-10-CM

## 2024-01-18 PROCEDURE — 1036F TOBACCO NON-USER: CPT | Performed by: FAMILY MEDICINE

## 2024-01-18 PROCEDURE — 3008F BODY MASS INDEX DOCD: CPT | Performed by: FAMILY MEDICINE

## 2024-01-18 PROCEDURE — 99214 OFFICE O/P EST MOD 30 MIN: CPT | Performed by: FAMILY MEDICINE

## 2024-01-18 RX ORDER — ONDANSETRON 8 MG/1
8 TABLET, ORALLY DISINTEGRATING ORAL EVERY 8 HOURS PRN
Qty: 20 TABLET | Refills: 0 | Status: SHIPPED | OUTPATIENT
Start: 2024-01-18 | End: 2024-01-29 | Stop reason: ALTCHOICE

## 2024-01-18 ASSESSMENT — ENCOUNTER SYMPTOMS
VOMITING: 1
CHILLS: 0
SORE THROAT: 0
ABDOMINAL PAIN: 1
SHORTNESS OF BREATH: 0
RHINORRHEA: 0
BLOOD IN STOOL: 0
HEMATURIA: 0
HEADACHES: 1
SWEATS: 1
DIARRHEA: 1
FEVER: 0
WHEEZING: 0
DIZZINESS: 1
DIFFICULTY URINATING: 0
COUGH: 0
CONSTIPATION: 0
DYSURIA: 0
NAUSEA: 1

## 2024-01-18 NOTE — ASSESSMENT & PLAN NOTE
Pt with n/v/d x 2 d, will check stool studies.  Pt to take zofran as directed, take med before trying to eat or drink.  May use gatorade, powerade or adult pedialyte, take sips every 10 min.  May use imodium as needed, BRAT diet. Soups broth and toast. Avoid trying to eat heavy meals, eat small amt throughout the day.  Pt to go to ER if not improving over next 24 hrs or if symptoms worsening.

## 2024-01-18 NOTE — PROGRESS NOTES
Subjective   Patient ID: Chyna Lorenzo is a 53 y.o. female who presents for Vomiting and Diarrhea.    Vomiting   This is a new problem. The current episode started in the past 7 days. The problem occurs 5 to 10 times per day. The problem has been gradually worsening. The emesis has an appearance of coffee grounds. There has been no fever. Associated symptoms include abdominal pain, diarrhea, dizziness, headaches and sweats. Pertinent negatives include no chest pain, chills, coughing or fever. Associated symptoms comments: Cannot keep food or drinks in stomach, cramps.   Diarrhea   This is a new problem. The current episode started in the past 7 days. The problem has been gradually worsening. The stool consistency is described as Watery. The patient states that diarrhea awakens her from sleep. Associated symptoms include abdominal pain, headaches, sweats and vomiting. Pertinent negatives include no chills, coughing or fever.        Review of Systems   Constitutional:  Negative for chills and fever.   HENT:  Negative for congestion, ear pain, rhinorrhea and sore throat.    Respiratory:  Negative for cough, shortness of breath and wheezing.    Cardiovascular:  Negative for chest pain.   Gastrointestinal:  Positive for abdominal pain, diarrhea, nausea and vomiting. Negative for blood in stool and constipation.        Duration 2 d. Pt hasn't taken any meds for symptoms  No new foods,   Had eaten fast food  No travel  No family ill.   Genitourinary:  Negative for difficulty urinating, dysuria, hematuria, vaginal bleeding and vaginal discharge.   Neurological:  Positive for dizziness and headaches.       Objective   /78   Pulse (!) 135   Temp 36.9 °C (98.4 °F)   Resp 19   Wt 79.8 kg (176 lb)   SpO2 98%   BMI 27.57 kg/m²     Physical Exam  Vitals and nursing note reviewed.   Constitutional:       General: She is not in acute distress.     Appearance: Normal appearance.   HENT:      Head: Normocephalic and  atraumatic.   Cardiovascular:      Rate and Rhythm: Normal rate and regular rhythm.      Heart sounds: Normal heart sounds.   Pulmonary:      Effort: Pulmonary effort is normal.      Breath sounds: Normal breath sounds.   Abdominal:      General: Abdomen is flat.      Palpations: Abdomen is soft. There is no mass.      Tenderness: There is no abdominal tenderness. There is no guarding or rebound.      Comments: Hyperactive BSs   Neurological:      General: No focal deficit present.      Mental Status: She is alert.         Assessment/Plan   Problem List Items Addressed This Visit             ICD-10-CM    Gastroenteritis - Primary K52.9     Pt with n/v/d x 2 d, will check stool studies.  Pt to take zofran as directed, take med before trying to eat or drink.  May use gatorade, powerade or adult pedialyte, take sips every 10 min.  May use imodium as needed, BRAT diet. Soups broth and toast. Avoid trying to eat heavy meals, eat small amt throughout the day.  Pt to go to ER if not improving over next 24 hrs or if symptoms worsening.         Relevant Medications    ondansetron ODT (Zofran-ODT) 8 mg disintegrating tablet    Other Relevant Orders    C. difficile, PCR    Ova/Para + Giardia/Cryptosporidium Antigen    Stool Pathogen Panel, PCR

## 2024-01-21 ENCOUNTER — APPOINTMENT (OUTPATIENT)
Dept: CARDIOLOGY | Facility: HOSPITAL | Age: 54
End: 2024-01-21
Payer: COMMERCIAL

## 2024-01-21 ENCOUNTER — HOSPITAL ENCOUNTER (EMERGENCY)
Facility: HOSPITAL | Age: 54
Discharge: HOME | End: 2024-01-21
Attending: EMERGENCY MEDICINE
Payer: COMMERCIAL

## 2024-01-21 VITALS
RESPIRATION RATE: 18 BRPM | HEIGHT: 67 IN | DIASTOLIC BLOOD PRESSURE: 70 MMHG | HEART RATE: 77 BPM | BODY MASS INDEX: 24.8 KG/M2 | WEIGHT: 158 LBS | SYSTOLIC BLOOD PRESSURE: 144 MMHG | TEMPERATURE: 97.5 F | OXYGEN SATURATION: 97 %

## 2024-01-21 DIAGNOSIS — K52.9 GASTROENTERITIS: Primary | ICD-10-CM

## 2024-01-21 LAB
ALBUMIN SERPL BCP-MCNC: 4.3 G/DL (ref 3.4–5)
ALP SERPL-CCNC: 40 U/L (ref 33–110)
ALT SERPL W P-5'-P-CCNC: 26 U/L (ref 7–45)
ANION GAP SERPL CALC-SCNC: 12 MMOL/L (ref 10–20)
APPEARANCE UR: ABNORMAL
AST SERPL W P-5'-P-CCNC: 23 U/L (ref 9–39)
BASOPHILS # BLD AUTO: 0.02 X10*3/UL (ref 0–0.1)
BASOPHILS NFR BLD AUTO: 0.5 %
BILIRUB SERPL-MCNC: 0.3 MG/DL (ref 0–1.2)
BILIRUB UR STRIP.AUTO-MCNC: NEGATIVE MG/DL
BUN SERPL-MCNC: 19 MG/DL (ref 6–23)
CALCIUM SERPL-MCNC: 9 MG/DL (ref 8.6–10.3)
CARDIAC TROPONIN I PNL SERPL HS: 3 NG/L (ref 0–13)
CARDIAC TROPONIN I PNL SERPL HS: 3 NG/L (ref 0–13)
CHLORIDE SERPL-SCNC: 100 MMOL/L (ref 98–107)
CO2 SERPL-SCNC: 27 MMOL/L (ref 21–32)
COLOR UR: YELLOW
CREAT SERPL-MCNC: 0.87 MG/DL (ref 0.5–1.05)
EGFRCR SERPLBLD CKD-EPI 2021: 79 ML/MIN/1.73M*2
EOSINOPHIL # BLD AUTO: 0.14 X10*3/UL (ref 0–0.7)
EOSINOPHIL NFR BLD AUTO: 3.3 %
ERYTHROCYTE [DISTWIDTH] IN BLOOD BY AUTOMATED COUNT: 12 % (ref 11.5–14.5)
FLUAV RNA RESP QL NAA+PROBE: NOT DETECTED
FLUBV RNA RESP QL NAA+PROBE: NOT DETECTED
GLUCOSE SERPL-MCNC: 87 MG/DL (ref 74–99)
GLUCOSE UR STRIP.AUTO-MCNC: NEGATIVE MG/DL
HCT VFR BLD AUTO: 44.2 % (ref 36–46)
HGB BLD-MCNC: 14.5 G/DL (ref 12–16)
HOLD SPECIMEN: NORMAL
IMM GRANULOCYTES # BLD AUTO: 0.01 X10*3/UL (ref 0–0.7)
IMM GRANULOCYTES NFR BLD AUTO: 0.2 % (ref 0–0.9)
KETONES UR STRIP.AUTO-MCNC: ABNORMAL MG/DL
LACTATE SERPL-SCNC: 0.6 MMOL/L (ref 0.4–2)
LEUKOCYTE ESTERASE UR QL STRIP.AUTO: NEGATIVE
LIPASE SERPL-CCNC: 29 U/L (ref 9–82)
LYMPHOCYTES # BLD AUTO: 1.69 X10*3/UL (ref 1.2–4.8)
LYMPHOCYTES NFR BLD AUTO: 39.5 %
MCH RBC QN AUTO: 30 PG (ref 26–34)
MCHC RBC AUTO-ENTMCNC: 32.8 G/DL (ref 32–36)
MCV RBC AUTO: 92 FL (ref 80–100)
MONOCYTES # BLD AUTO: 0.49 X10*3/UL (ref 0.1–1)
MONOCYTES NFR BLD AUTO: 11.4 %
NEUTROPHILS # BLD AUTO: 1.93 X10*3/UL (ref 1.2–7.7)
NEUTROPHILS NFR BLD AUTO: 45.1 %
NITRITE UR QL STRIP.AUTO: NEGATIVE
NRBC BLD-RTO: 0 /100 WBCS (ref 0–0)
PH UR STRIP.AUTO: 5 [PH]
PLATELET # BLD AUTO: 252 X10*3/UL (ref 150–450)
POTASSIUM SERPL-SCNC: 3.5 MMOL/L (ref 3.5–5.3)
PROT SERPL-MCNC: 7.1 G/DL (ref 6.4–8.2)
PROT UR STRIP.AUTO-MCNC: NEGATIVE MG/DL
RBC # BLD AUTO: 4.83 X10*6/UL (ref 4–5.2)
RBC # UR STRIP.AUTO: ABNORMAL /UL
RBC #/AREA URNS AUTO: NORMAL /HPF
SARS-COV-2 RNA RESP QL NAA+PROBE: NOT DETECTED
SODIUM SERPL-SCNC: 135 MMOL/L (ref 136–145)
SP GR UR STRIP.AUTO: 1.01
SQUAMOUS #/AREA URNS AUTO: NORMAL /HPF
UROBILINOGEN UR STRIP.AUTO-MCNC: <2 MG/DL
WBC # BLD AUTO: 4.3 X10*3/UL (ref 4.4–11.3)
WBC #/AREA URNS AUTO: NORMAL /HPF

## 2024-01-21 PROCEDURE — 99285 EMERGENCY DEPT VISIT HI MDM: CPT | Performed by: EMERGENCY MEDICINE

## 2024-01-21 PROCEDURE — 93005 ELECTROCARDIOGRAM TRACING: CPT

## 2024-01-21 PROCEDURE — 84484 ASSAY OF TROPONIN QUANT: CPT | Performed by: STUDENT IN AN ORGANIZED HEALTH CARE EDUCATION/TRAINING PROGRAM

## 2024-01-21 PROCEDURE — 99284 EMERGENCY DEPT VISIT MOD MDM: CPT | Performed by: EMERGENCY MEDICINE

## 2024-01-21 PROCEDURE — 36415 COLL VENOUS BLD VENIPUNCTURE: CPT | Performed by: STUDENT IN AN ORGANIZED HEALTH CARE EDUCATION/TRAINING PROGRAM

## 2024-01-21 PROCEDURE — 93010 ELECTROCARDIOGRAM REPORT: CPT | Performed by: EMERGENCY MEDICINE

## 2024-01-21 PROCEDURE — 85025 COMPLETE CBC W/AUTO DIFF WBC: CPT | Performed by: STUDENT IN AN ORGANIZED HEALTH CARE EDUCATION/TRAINING PROGRAM

## 2024-01-21 PROCEDURE — 83690 ASSAY OF LIPASE: CPT | Performed by: STUDENT IN AN ORGANIZED HEALTH CARE EDUCATION/TRAINING PROGRAM

## 2024-01-21 PROCEDURE — 87636 SARSCOV2 & INF A&B AMP PRB: CPT | Performed by: STUDENT IN AN ORGANIZED HEALTH CARE EDUCATION/TRAINING PROGRAM

## 2024-01-21 PROCEDURE — 2500000004 HC RX 250 GENERAL PHARMACY W/ HCPCS (ALT 636 FOR OP/ED): Performed by: STUDENT IN AN ORGANIZED HEALTH CARE EDUCATION/TRAINING PROGRAM

## 2024-01-21 PROCEDURE — 96375 TX/PRO/DX INJ NEW DRUG ADDON: CPT

## 2024-01-21 PROCEDURE — 96374 THER/PROPH/DIAG INJ IV PUSH: CPT

## 2024-01-21 PROCEDURE — 81001 URINALYSIS AUTO W/SCOPE: CPT | Performed by: STUDENT IN AN ORGANIZED HEALTH CARE EDUCATION/TRAINING PROGRAM

## 2024-01-21 PROCEDURE — 96361 HYDRATE IV INFUSION ADD-ON: CPT

## 2024-01-21 PROCEDURE — 83605 ASSAY OF LACTIC ACID: CPT | Performed by: STUDENT IN AN ORGANIZED HEALTH CARE EDUCATION/TRAINING PROGRAM

## 2024-01-21 PROCEDURE — 84075 ASSAY ALKALINE PHOSPHATASE: CPT | Performed by: STUDENT IN AN ORGANIZED HEALTH CARE EDUCATION/TRAINING PROGRAM

## 2024-01-21 RX ORDER — PROCHLORPERAZINE EDISYLATE 5 MG/ML
10 INJECTION INTRAMUSCULAR; INTRAVENOUS ONCE
Status: COMPLETED | OUTPATIENT
Start: 2024-01-21 | End: 2024-01-21

## 2024-01-21 RX ORDER — PROCHLORPERAZINE MALEATE 10 MG
10 TABLET ORAL 2 TIMES DAILY PRN
Qty: 10 TABLET | Refills: 0 | Status: SHIPPED | OUTPATIENT
Start: 2024-01-21 | End: 2024-01-29 | Stop reason: ALTCHOICE

## 2024-01-21 RX ORDER — KETOROLAC TROMETHAMINE 15 MG/ML
15 INJECTION, SOLUTION INTRAMUSCULAR; INTRAVENOUS ONCE
Status: COMPLETED | OUTPATIENT
Start: 2024-01-21 | End: 2024-01-21

## 2024-01-21 RX ADMIN — SODIUM CHLORIDE, POTASSIUM CHLORIDE, SODIUM LACTATE AND CALCIUM CHLORIDE 1000 ML: 600; 310; 30; 20 INJECTION, SOLUTION INTRAVENOUS at 10:11

## 2024-01-21 RX ADMIN — PROCHLORPERAZINE EDISYLATE 10 MG: 5 INJECTION INTRAMUSCULAR; INTRAVENOUS at 10:12

## 2024-01-21 RX ADMIN — KETOROLAC TROMETHAMINE 15 MG: 15 INJECTION, SOLUTION INTRAMUSCULAR; INTRAVENOUS at 10:11

## 2024-01-21 ASSESSMENT — LIFESTYLE VARIABLES
EVER FELT BAD OR GUILTY ABOUT YOUR DRINKING: NO
EVER HAD A DRINK FIRST THING IN THE MORNING TO STEADY YOUR NERVES TO GET RID OF A HANGOVER: NO
HAVE PEOPLE ANNOYED YOU BY CRITICIZING YOUR DRINKING: NO
HAVE YOU EVER FELT YOU SHOULD CUT DOWN ON YOUR DRINKING: NO

## 2024-01-21 ASSESSMENT — COLUMBIA-SUICIDE SEVERITY RATING SCALE - C-SSRS
2. HAVE YOU ACTUALLY HAD ANY THOUGHTS OF KILLING YOURSELF?: NO
1. IN THE PAST MONTH, HAVE YOU WISHED YOU WERE DEAD OR WISHED YOU COULD GO TO SLEEP AND NOT WAKE UP?: NO
6. HAVE YOU EVER DONE ANYTHING, STARTED TO DO ANYTHING, OR PREPARED TO DO ANYTHING TO END YOUR LIFE?: NO

## 2024-01-21 ASSESSMENT — PAIN SCALES - GENERAL
PAINLEVEL_OUTOF10: 4
PAINLEVEL_OUTOF10: 0 - NO PAIN

## 2024-01-21 ASSESSMENT — PAIN - FUNCTIONAL ASSESSMENT: PAIN_FUNCTIONAL_ASSESSMENT: 0-10

## 2024-01-21 NOTE — Clinical Note
Chyna Lorenzo was seen and treated in our emergency department on 1/21/2024.  She may return to work on 01/23/2024.       If you have any questions or concerns, please don't hesitate to call.      Cristina Zarate, DO

## 2024-01-22 NOTE — ED PROVIDER NOTES
HPI   Chief Complaint   Patient presents with    Nausea    Vomiting    Diarrhea       53-year-old female who presents to the emergency department with nausea and vomiting that began on Wednesday and progressed to diarrhea on Friday.  She states she had a brief reprieve yesterday, and was able to get a little toast down, but then as the day progressed developed worsening diarrhea, and has not been able to keep down any food or fluids without it running right through her.  She has been attempting to manage this at home with Zofran, but it has not been going well.  Endorses recent sick contacts and states that the flu has been going around in her family however when her son had something similar, it only lasted 2 or 3 days and then resolved.  She endorses some epigastric abdominal pain but also states that the soreness is all over her belly.  States that she has been urinating less than normal.  Denies fevers, shortness of breath, or chest pain.  No nasal congestion or cough.                          New Coma Scale Score: 15                  Patient History   Past Medical History:   Diagnosis Date    Abdominal distension (gaseous)     Bloating    Acute upper respiratory infection, unspecified 03/31/2022    URI with cough and congestion    Anxiety disorder, unspecified 01/04/2022    Anxiety    Attention-deficit hyperactivity disorder, predominantly inattentive type 07/09/2019    Attention deficit hyperactivity disorder (ADHD), predominantly inattentive type    Body mass index (BMI) 32.0-32.9, adult 09/07/2021    BMI 32.0-32.9,adult    COVID-19 01/04/2022    COVID-19 with pulmonary comorbidity    Edema, unspecified 09/07/2021    Dependent edema    Epidermal cyst 01/02/2019    Epidermal cyst of neck    Gastro-esophageal reflux disease without esophagitis 09/07/2021    GERD (gastroesophageal reflux disease)    Nontoxic single thyroid nodule 05/19/2020    Thyroid nodule    Other diseases of larynx 01/23/2019     Laryngeal disorder    Other symptoms and signs involving emotional state     Feeling anxious    Personal history of other diseases of the digestive system 05/19/2020    History of chronic constipation    Personal history of other diseases of the musculoskeletal system and connective tissue 10/17/2018    History of neck pain    Personal history of other diseases of the respiratory system 04/03/2022    History of acute sinusitis    Personal history of other diseases of the respiratory system 03/31/2022    History of sore throat    Personal history of other diseases of the respiratory system 11/29/2019    History of upper respiratory infection    Personal history of other diseases of the respiratory system 09/29/2021    History of pharyngitis    Personal history of other specified conditions 09/24/2020    History of insomnia    Personal history of other specified conditions 10/16/2019    History of urinary frequency     Past Surgical History:   Procedure Laterality Date    OTHER SURGICAL HISTORY  10/16/2019    Mastopexy    OTHER SURGICAL HISTORY  10/16/2019    Tubal ligation     Family History   Problem Relation Name Age of Onset    Diabetes Mother      Other (CARDIAC DISORDER) Father      Diabetes Sister      Diabetes Brother      ADD / ADHD Daughter       Social History     Tobacco Use    Smoking status: Never    Smokeless tobacco: Never   Vaping Use    Vaping Use: Never used   Substance Use Topics    Alcohol use: Never    Drug use: Never       Physical Exam   ED Triage Vitals [01/21/24 0927]   Temp Heart Rate Respirations BP   36.4 °C (97.5 °F) 82 18 145/77      Pulse Ox Temp Source Heart Rate Source Patient Position   94 % Tympanic Monitor Sitting      BP Location FiO2 (%)     Right arm --       Physical Exam  Vitals and nursing note reviewed.   Constitutional:       General: She is not in acute distress.     Appearance: She is well-developed.   HENT:      Head: Normocephalic and atraumatic.   Eyes:       Conjunctiva/sclera: Conjunctivae normal.   Cardiovascular:      Rate and Rhythm: Normal rate and regular rhythm.      Heart sounds: No murmur heard.  Pulmonary:      Effort: Pulmonary effort is normal. No respiratory distress.      Breath sounds: Normal breath sounds.   Abdominal:      Palpations: Abdomen is soft.      Tenderness: There is no abdominal tenderness.   Musculoskeletal:         General: No swelling.      Cervical back: Neck supple.   Skin:     General: Skin is warm and dry.      Capillary Refill: Capillary refill takes less than 2 seconds.   Neurological:      Mental Status: She is alert.   Psychiatric:         Mood and Affect: Mood normal.         ED Course & MDM   ED Course as of 01/21/24 2005   Sun Jan 21, 2024   1400 Patient given IV fluid bolus and a dose of Compazine.  Reglan less likely to be helpful given its promotile tendencies.  Laboratory studies show trace urinary ketones consistent with this patient's lack of oral intake over the last several days, white count of 4.3, but no thrombocytopenia, no signs of anemia.  No significant electrolyte derangements and no obvious kidney injury.  EKG is nonischemic and troponins are negative x 2.  Flu and COVID swabs are negative and urine is grossly uninfected.  Initially given a dose of Toradol for this patient's pain.  Given that she has no fevers, no leukocytosis, no lactic acidosis, her lipase is negative, and she has no tenderness rebound or guarding on exam, very low suspicion for intra-abdominal pathology of a surgical or serious bacterial nature.  CT scans deferred.  Discussed with the patient following these interventions and she feels much improved.  Will discharge her with a prescription for Compazine [AS]      ED Course User Index  [AS] Cristina Zarate DO         Diagnoses as of 01/21/24 2005   Gastroenteritis       Medical Decision Making  History obtained from: Patient    External records reviewed: I reviewed external records including  outpatient, PCP records, and prior discharge summaries    I have reviewed this case with the ED attending physician, and the attending agrees with the plan. Patient or family was counselled regarding labs, imaging, likely diagnosis, and plan. All questions were answered.     Cristina Zarate DO  PGY-4, emergency medicine    The above documentation was completed with the use of speech recognition software. It may contain dictation errors secondary to limitations of the software.          Procedure  Procedures     Cristina Zarate DO  Resident  01/21/24 2006

## 2024-01-29 ENCOUNTER — TELEPHONE (OUTPATIENT)
Dept: PRIMARY CARE | Facility: CLINIC | Age: 54
End: 2024-01-29

## 2024-01-29 ENCOUNTER — OFFICE VISIT (OUTPATIENT)
Dept: PRIMARY CARE | Facility: CLINIC | Age: 54
End: 2024-01-29
Payer: COMMERCIAL

## 2024-01-29 VITALS
HEART RATE: 84 BPM | RESPIRATION RATE: 16 BRPM | SYSTOLIC BLOOD PRESSURE: 120 MMHG | DIASTOLIC BLOOD PRESSURE: 75 MMHG | WEIGHT: 181 LBS | OXYGEN SATURATION: 98 % | TEMPERATURE: 97.4 F | HEIGHT: 67 IN | BODY MASS INDEX: 28.41 KG/M2

## 2024-01-29 DIAGNOSIS — E53.8 B12 DEFICIENCY: Primary | ICD-10-CM

## 2024-01-29 DIAGNOSIS — K58.1 IRRITABLE BOWEL SYNDROME WITH CONSTIPATION: ICD-10-CM

## 2024-01-29 DIAGNOSIS — F41.9 ANXIETY: ICD-10-CM

## 2024-01-29 DIAGNOSIS — E55.9 VITAMIN D DEFICIENCY: ICD-10-CM

## 2024-01-29 DIAGNOSIS — N95.9 POSTMENOPAUSAL SYMPTOMS: ICD-10-CM

## 2024-01-29 DIAGNOSIS — K21.9 GASTROESOPHAGEAL REFLUX DISEASE, UNSPECIFIED WHETHER ESOPHAGITIS PRESENT: ICD-10-CM

## 2024-01-29 DIAGNOSIS — M22.2X2 PATELLOFEMORAL PAIN SYNDROME OF LEFT KNEE: ICD-10-CM

## 2024-01-29 DIAGNOSIS — E66.3 OVERWEIGHT WITH BODY MASS INDEX (BMI) OF 28 TO 28.9 IN ADULT: ICD-10-CM

## 2024-01-29 PROCEDURE — 99214 OFFICE O/P EST MOD 30 MIN: CPT | Performed by: FAMILY MEDICINE

## 2024-01-29 PROCEDURE — 1036F TOBACCO NON-USER: CPT | Performed by: FAMILY MEDICINE

## 2024-01-29 PROCEDURE — 3008F BODY MASS INDEX DOCD: CPT | Performed by: FAMILY MEDICINE

## 2024-01-29 RX ORDER — NEEDLES, SAFETY 18GX1 1/2"
1 NEEDLE, DISPOSABLE MISCELLANEOUS
Qty: 12 EACH | Refills: 0 | Status: SHIPPED | OUTPATIENT
Start: 2024-01-29 | End: 2024-02-28

## 2024-01-29 RX ORDER — MELOXICAM 15 MG/1
15 TABLET ORAL DAILY PRN
Qty: 90 TABLET | Refills: 3 | Status: SHIPPED | OUTPATIENT
Start: 2024-01-29 | End: 2025-01-28

## 2024-01-29 RX ORDER — HYDROCHLOROTHIAZIDE 25 MG/1
25 TABLET ORAL DAILY
Qty: 90 TABLET | Refills: 3 | Status: SHIPPED | OUTPATIENT
Start: 2024-01-29 | End: 2025-01-28

## 2024-01-29 RX ORDER — CONJUGATED ESTROGENS AND MEDROXYPROGESTERONE ACETATE .625; 2.5 MG/1; MG/1
1 TABLET, SUGAR COATED ORAL DAILY
Qty: 30 TABLET | Refills: 1 | Status: SHIPPED | OUTPATIENT
Start: 2024-01-29 | End: 2024-03-29

## 2024-01-29 RX ORDER — PANTOPRAZOLE SODIUM 20 MG/1
20 TABLET, DELAYED RELEASE ORAL DAILY
Qty: 90 TABLET | Refills: 3 | Status: SHIPPED | OUTPATIENT
Start: 2024-01-29 | End: 2025-01-28

## 2024-01-29 ASSESSMENT — ENCOUNTER SYMPTOMS
WOUND: 0
BRUISES/BLEEDS EASILY: 0
FREQUENCY: 0
CHILLS: 0
EYE DISCHARGE: 0
RHINORRHEA: 0
NECK PAIN: 0
HEADACHES: 0
MYALGIAS: 0
DYSPHORIC MOOD: 0
AGITATION: 0
PALPITATIONS: 0
ABDOMINAL DISTENTION: 0
ACTIVITY CHANGE: 0
DIZZINESS: 0
SLEEP DISTURBANCE: 0
CONSTIPATION: 0
PHOTOPHOBIA: 0
SEIZURES: 0
EYE REDNESS: 0
NECK STIFFNESS: 0
FATIGUE: 0
CHOKING: 0
NUMBNESS: 0
WEAKNESS: 0
JOINT SWELLING: 0
POLYDIPSIA: 0
HEMATURIA: 0
WHEEZING: 0
BACK PAIN: 0
SORE THROAT: 0
SPEECH DIFFICULTY: 0
FACIAL ASYMMETRY: 0
VOMITING: 0
EYE ITCHING: 0
DYSURIA: 0
LIGHT-HEADEDNESS: 0
BLOOD IN STOOL: 0
TREMORS: 0
NERVOUS/ANXIOUS: 0
CONFUSION: 0
COUGH: 0
VOICE CHANGE: 0
EYE PAIN: 0
CHEST TIGHTNESS: 0
TROUBLE SWALLOWING: 0
DIARRHEA: 0
ADENOPATHY: 0
NAUSEA: 0
HALLUCINATIONS: 0
FEVER: 0
SINUS PRESSURE: 0
DIAPHORESIS: 0
SHORTNESS OF BREATH: 0
ARTHRALGIAS: 0
FLANK PAIN: 0
APPETITE CHANGE: 0
UNEXPECTED WEIGHT CHANGE: 0
ABDOMINAL PAIN: 0

## 2024-01-29 NOTE — PROGRESS NOTES
Subjective   Patient ID: Chyna Lorenzo is a 53 y.o. female who presents for mermory issues and discuss medication (Ozempic).    Patient would like trial of GLP.  She was previously on Wegovy but she has been unable to find it.  Her insurance covered it before the first of the year but for some reason they stopped covering it and she is uncertain if they just stopped covering all of this class.  She has also been having issues with anxiety and memory issues.  She is concerned that these could be related to her postmenopausal symptomatology as she is not sleeping well she has numerous issues with hot flashes and has not been sleeping well for several months.  Her gynecologist did start her on a patch but she was unable to afford the patch.  She reports the patch did help while she was taking.  She is due for refills for her arthritis,/Knee pain, GERD, IBS, B12 deficiency.  She would like to see if she can get a refill of the GLP instead of the Wegovy.  She did not discuss with her insurance.    Anxiety  Patient reports no chest pain, confusion, dizziness, nausea, nervous/anxious behavior, palpitations, shortness of breath or suicidal ideas.       GERD  She reports no abdominal pain, no chest pain, no choking, no coughing, no nausea, no sore throat or no wheezing. Pertinent negatives include no fatigue.       Review of Systems   Constitutional:  Negative for activity change, appetite change, chills, diaphoresis, fatigue, fever and unexpected weight change.   HENT:  Negative for congestion, ear pain, hearing loss, nosebleeds, postnasal drip, rhinorrhea, sinus pressure, sneezing, sore throat, tinnitus, trouble swallowing and voice change.    Eyes:  Negative for photophobia, pain, discharge, redness, itching and visual disturbance.   Respiratory:  Negative for cough, choking, chest tightness, shortness of breath and wheezing.    Cardiovascular:  Negative for chest pain, palpitations and leg swelling.   Gastrointestinal:   "Negative for abdominal distention, abdominal pain, blood in stool, constipation, diarrhea, nausea and vomiting.   Endocrine: Negative for cold intolerance, heat intolerance, polydipsia and polyuria.   Genitourinary:  Negative for dysuria, flank pain, frequency, hematuria and urgency.   Musculoskeletal:  Negative for arthralgias, back pain, joint swelling, myalgias, neck pain and neck stiffness.   Skin:  Negative for rash and wound.   Allergic/Immunologic: Negative for immunocompromised state.   Neurological:  Negative for dizziness, tremors, seizures, syncope, facial asymmetry, speech difficulty, weakness, light-headedness, numbness and headaches.   Hematological:  Negative for adenopathy. Does not bruise/bleed easily.   Psychiatric/Behavioral:  Negative for agitation, behavioral problems, confusion, dysphoric mood, hallucinations, self-injury, sleep disturbance and suicidal ideas. The patient is not nervous/anxious.        Objective   /75 (BP Location: Right arm, Patient Position: Sitting, BP Cuff Size: Large adult)   Pulse 84   Temp 36.3 °C (97.4 °F) (Temporal)   Resp 16   Ht 1.702 m (5' 7\")   Wt 82.1 kg (181 lb)   SpO2 98%   BMI 28.35 kg/m²     Physical Exam  Constitutional:       General: She is not in acute distress.     Appearance: She is not ill-appearing or diaphoretic.   HENT:      Head: Normocephalic and atraumatic.      Right Ear: External ear normal.      Left Ear: External ear normal.      Nose: Nose normal. No rhinorrhea.   Eyes:      General: Lids are normal. No scleral icterus.        Right eye: No discharge.         Left eye: No discharge.      Conjunctiva/sclera: Conjunctivae normal.   Cardiovascular:      Rate and Rhythm: Normal rate and regular rhythm.      Pulses: Normal pulses.      Heart sounds: No murmur heard.  Pulmonary:      Effort: Pulmonary effort is normal. No respiratory distress.      Breath sounds: No decreased breath sounds, wheezing, rhonchi or rales.   Abdominal:      " "General: Bowel sounds are normal. There is no distension.      Palpations: Abdomen is soft. There is no mass.      Tenderness: There is no abdominal tenderness. There is no guarding or rebound.   Musculoskeletal:         General: No swelling, tenderness or deformity.      Cervical back: No rigidity or tenderness.      Right lower leg: No edema.      Left lower leg: No edema.   Lymphadenopathy:      Cervical: No cervical adenopathy.      Upper Body:      Right upper body: No supraclavicular adenopathy.      Left upper body: No supraclavicular adenopathy.   Skin:     General: Skin is warm and dry.      Coloration: Skin is not jaundiced or pale.      Findings: No erythema, lesion or rash.   Neurological:      General: No focal deficit present.      Mental Status: She is alert and oriented to person, place, and time.      Sensory: No sensory deficit.      Motor: No weakness or tremor.      Coordination: Coordination normal.      Gait: Gait normal.   Psychiatric:         Mood and Affect: Mood normal. Affect is not inappropriate.         Behavior: Behavior normal.         Assessment/Plan   Diagnoses and all orders for this visit:  B12 deficiency  -     cyanocobalamin, vitamin B-12, 1,000 mcg/mL kit; Inject 1,000 mcg as directed 1 (one) time per week. WEEKLY X 3 DOSES THEN MONTHLY THEREAFTER  -     syringe with needle (BD Eclipse Luer-Mindy) 3 mL 23 x 1\" syringe; 1 each every 30 (thirty) days. USE WITH B12 SOLUTION  -     Vitamin B12; Future  -     Follow [facility] In [facility] - PCP - Established; Future  Patellofemoral pain syndrome of left knee  -     meloxicam (Mobic) 15 mg tablet; Take 1 tablet (15 mg) by mouth once daily as needed for mild pain (1 - 3).  -     Follow [facility] - PCP - Established; Future  Irritable bowel syndrome with constipation  -     linaCLOtide (Linzess) 145 mcg capsule; Take 1 capsule (145 mcg) by mouth once daily.  -     Follow [facility] - PCP - " Established; Future  Gastroesophageal reflux disease, unspecified whether esophagitis present  -     pantoprazole (ProtoNix) 20 mg EC tablet; Take 1 tablet (20 mg) by mouth once daily.  -     Follow Up [facility] - PCP - Established; Future  Postmenopausal symptoms  -     hydroCHLOROthiazide (HYDRODiuril) 25 mg tablet; Take 1 tablet (25 mg) by mouth once daily.  -     conj estrog-medroxyprogest ace (Prempro) 0.625-2.5 mg tablet; Take 1 tablet by mouth once daily.  -     CBC and Auto Differential; Future  -     Comprehensive Metabolic Panel; Future  -     Lipid Panel; Future  -     Magnesium; Future  -     TSH with reflex to Free T4 if abnormal; Future  -     Sedimentation Rate; Future  -     C-Reactive Protein; Future  -     Follow [facility] - PCP - Established; Future  Vitamin D deficiency  -     Vitamin D 25-Hydroxy,Total (for eval of Vitamin D levels); Future  -     Follow Up [facility] - PCP - Established; Future  Anxiety  -     CBC and Auto Differential; Future  -     Comprehensive Metabolic Panel; Future  -     Lipid Panel; Future  -     Magnesium; Future  -     TSH with reflex to Free T4 if abnormal; Future  -     Sedimentation Rate; Future  -     C-Reactive Protein; Future  -     Follow Up In [facility] - PCP - Established; Future  Overweight with body mass index (BMI) of 28 to 28.9 in adult  -     tirzepatide, weight loss, (Zepbound) 2.5 mg/0.5 mL injection; Inject 2.5 mg under the skin every 7 days.  -     Follow Up In [facility] - PCP - Established; Future  Trial of progesterone/estrogen replacement therapy.  Risks discussed in detail.  Patient stated understanding and acceptance of risks.  If she does not get significant improvement then we would add sertraline at that point.  Patient stated understanding.  1 month with labs

## 2024-01-29 NOTE — PATIENT INSTRUCTIONS
BMI was above normal measurement. Current weight: 82.1 kg (181 lb)  Weight change since last visit (-) denotes wt loss 23 lbs   Weight loss needed to achieve BMI 25: 21.7 Lbs  Weight loss needed to achieve BMI 30: -10.1 Lbs  Provided instructions on dietary changes  Provided instructions on exercise  Advised to Increase physical activity.

## 2024-01-31 LAB
ATRIAL RATE: 69 BPM
ATRIAL RATE: 77 BPM
P AXIS: 33 DEGREES
P AXIS: 49 DEGREES
P OFFSET: 192 MS
P OFFSET: 197 MS
P ONSET: 136 MS
P ONSET: 144 MS
PR INTERVAL: 160 MS
PR INTERVAL: 174 MS
Q ONSET: 223 MS
Q ONSET: 224 MS
QRS COUNT: 11 BEATS
QRS COUNT: 12 BEATS
QRS DURATION: 78 MS
QRS DURATION: 80 MS
QT INTERVAL: 394 MS
QT INTERVAL: 398 MS
QTC CALCULATION(BAZETT): 422 MS
QTC CALCULATION(BAZETT): 450 MS
QTC FREDERICIA: 413 MS
QTC FREDERICIA: 432 MS
R AXIS: -4 DEGREES
R AXIS: -4 DEGREES
T AXIS: 29 DEGREES
T AXIS: 31 DEGREES
T OFFSET: 421 MS
T OFFSET: 422 MS
VENTRICULAR RATE: 69 BPM
VENTRICULAR RATE: 77 BPM

## 2024-02-06 PROBLEM — I25.10 ATHEROSCLEROSIS OF NATIVE CORONARY ARTERY OF NATIVE HEART: Status: ACTIVE | Noted: 2018-08-08

## 2024-02-06 PROBLEM — I25.119 ATHEROSCLEROSIS OF NATIVE CORONARY ARTERY OF NATIVE HEART WITH ANGINA PECTORIS (CMS-HCC): Status: ACTIVE | Noted: 2018-08-08

## 2024-02-08 PROBLEM — I25.10 ATHEROSCLEROSIS OF NATIVE CORONARY ARTERY OF NATIVE HEART: Status: RESOLVED | Noted: 2018-08-08 | Resolved: 2024-02-08

## 2024-02-20 ENCOUNTER — TELEPHONE (OUTPATIENT)
Dept: PRIMARY CARE | Facility: CLINIC | Age: 54
End: 2024-02-20
Payer: COMMERCIAL

## 2024-02-20 NOTE — TELEPHONE ENCOUNTER
Prior authorization was denied for Maycol. Spoke to patient to notify of denial. Patient is to follow-up with PCP, and continue all other medications as prescribed. Advised patient to call [facility] at [phone] if any additional assistance is needed.

## 2024-02-26 PROBLEM — M17.0 PRIMARY OSTEOARTHRITIS OF BOTH KNEES: Status: RESOLVED | Noted: 2022-07-12 | Resolved: 2024-02-26

## 2024-02-26 PROBLEM — M22.2X9 PATELLOFEMORAL PAIN SYNDROME: Status: ACTIVE | Noted: 2023-02-13

## 2024-02-26 PROBLEM — R23.2 FLUSHING: Status: ACTIVE | Noted: 2024-02-26

## 2024-02-26 PROBLEM — F45.8 OTHER SOMATOFORM DISORDERS: Status: RESOLVED | Noted: 2018-09-24 | Resolved: 2024-02-26

## 2024-02-26 PROBLEM — K59.09 CHRONIC CONSTIPATION: Status: ACTIVE | Noted: 2024-02-26

## 2024-02-26 PROBLEM — G47.00 INSOMNIA: Status: ACTIVE | Noted: 2024-02-26
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
## 2024-02-28 ENCOUNTER — LAB (OUTPATIENT)
Dept: LAB | Facility: LAB | Age: 54
End: 2024-02-28
Payer: COMMERCIAL

## 2024-02-28 DIAGNOSIS — N95.1 MENOPAUSAL VASOMOTOR SYNDROME: ICD-10-CM

## 2024-02-28 DIAGNOSIS — F41.9 ANXIETY: ICD-10-CM

## 2024-02-28 DIAGNOSIS — Z11.4 SCREENING FOR HUMAN IMMUNODEFICIENCY VIRUS WITHOUT PRESENCE OF RISK FACTORS: ICD-10-CM

## 2024-02-28 DIAGNOSIS — E53.8 B12 DEFICIENCY: ICD-10-CM

## 2024-02-28 DIAGNOSIS — Z11.59 ENCOUNTER FOR HEPATITIS C SCREENING TEST FOR LOW RISK PATIENT: ICD-10-CM

## 2024-02-28 DIAGNOSIS — N95.9 POSTMENOPAUSAL SYMPTOMS: ICD-10-CM

## 2024-02-28 DIAGNOSIS — E55.9 VITAMIN D DEFICIENCY: ICD-10-CM

## 2024-02-28 LAB
25(OH)D3 SERPL-MCNC: 31 NG/ML (ref 30–100)
ALBUMIN SERPL BCP-MCNC: 4.1 G/DL (ref 3.4–5)
ALP SERPL-CCNC: 35 U/L (ref 33–110)
ALT SERPL W P-5'-P-CCNC: 19 U/L (ref 7–45)
ANION GAP SERPL CALC-SCNC: 11 MMOL/L (ref 10–20)
AST SERPL W P-5'-P-CCNC: 21 U/L (ref 9–39)
BASOPHILS # BLD AUTO: 0.03 X10*3/UL (ref 0–0.1)
BASOPHILS NFR BLD AUTO: 0.6 %
BILIRUB SERPL-MCNC: 0.4 MG/DL (ref 0–1.2)
BUN SERPL-MCNC: 16 MG/DL (ref 6–23)
CALCIUM SERPL-MCNC: 9.6 MG/DL (ref 8.6–10.3)
CHLORIDE SERPL-SCNC: 104 MMOL/L (ref 98–107)
CHOLEST SERPL-MCNC: 189 MG/DL (ref 0–199)
CHOLESTEROL/HDL RATIO: 3.3
CO2 SERPL-SCNC: 29 MMOL/L (ref 21–32)
CREAT SERPL-MCNC: 0.75 MG/DL (ref 0.5–1.05)
CRP SERPL-MCNC: 0.61 MG/DL
EGFRCR SERPLBLD CKD-EPI 2021: >90 ML/MIN/1.73M*2
EOSINOPHIL # BLD AUTO: 0.11 X10*3/UL (ref 0–0.7)
EOSINOPHIL NFR BLD AUTO: 2.3 %
ERYTHROCYTE [DISTWIDTH] IN BLOOD BY AUTOMATED COUNT: 12.4 % (ref 11.5–14.5)
ERYTHROCYTE [SEDIMENTATION RATE] IN BLOOD BY WESTERGREN METHOD: 8 MM/H (ref 0–30)
GLUCOSE SERPL-MCNC: 98 MG/DL (ref 74–99)
HCT VFR BLD AUTO: 39.2 % (ref 36–46)
HDLC SERPL-MCNC: 57.9 MG/DL
HGB BLD-MCNC: 12.9 G/DL (ref 12–16)
IMM GRANULOCYTES # BLD AUTO: 0.01 X10*3/UL (ref 0–0.7)
IMM GRANULOCYTES NFR BLD AUTO: 0.2 % (ref 0–0.9)
LDLC SERPL CALC-MCNC: 106 MG/DL
LYMPHOCYTES # BLD AUTO: 2.28 X10*3/UL (ref 1.2–4.8)
LYMPHOCYTES NFR BLD AUTO: 48.2 %
MAGNESIUM SERPL-MCNC: 1.81 MG/DL (ref 1.6–2.4)
MCH RBC QN AUTO: 30.4 PG (ref 26–34)
MCHC RBC AUTO-ENTMCNC: 32.9 G/DL (ref 32–36)
MCV RBC AUTO: 92 FL (ref 80–100)
MONOCYTES # BLD AUTO: 0.39 X10*3/UL (ref 0.1–1)
MONOCYTES NFR BLD AUTO: 8.2 %
NEUTROPHILS # BLD AUTO: 1.91 X10*3/UL (ref 1.2–7.7)
NEUTROPHILS NFR BLD AUTO: 40.5 %
NON HDL CHOLESTEROL: 131 MG/DL (ref 0–149)
NRBC BLD-RTO: 0 /100 WBCS (ref 0–0)
PLATELET # BLD AUTO: 294 X10*3/UL (ref 150–450)
POTASSIUM SERPL-SCNC: 4.1 MMOL/L (ref 3.5–5.3)
PROT SERPL-MCNC: 6.5 G/DL (ref 6.4–8.2)
RBC # BLD AUTO: 4.25 X10*6/UL (ref 4–5.2)
SODIUM SERPL-SCNC: 140 MMOL/L (ref 136–145)
TRIGL SERPL-MCNC: 127 MG/DL (ref 0–149)
TSH SERPL-ACNC: 3.1 MIU/L (ref 0.44–3.98)
VIT B12 SERPL-MCNC: 1199 PG/ML (ref 211–911)
VLDL: 25 MG/DL (ref 0–40)
WBC # BLD AUTO: 4.7 X10*3/UL (ref 4.4–11.3)

## 2024-02-28 PROCEDURE — 36415 COLL VENOUS BLD VENIPUNCTURE: CPT

## 2024-02-28 PROCEDURE — 82607 VITAMIN B-12: CPT

## 2024-02-28 PROCEDURE — 85652 RBC SED RATE AUTOMATED: CPT

## 2024-02-28 PROCEDURE — 86803 HEPATITIS C AB TEST: CPT

## 2024-02-28 PROCEDURE — 83735 ASSAY OF MAGNESIUM: CPT

## 2024-02-28 PROCEDURE — 85025 COMPLETE CBC W/AUTO DIFF WBC: CPT

## 2024-02-28 PROCEDURE — 87389 HIV-1 AG W/HIV-1&-2 AB AG IA: CPT

## 2024-02-28 PROCEDURE — 86140 C-REACTIVE PROTEIN: CPT

## 2024-02-28 PROCEDURE — 82306 VITAMIN D 25 HYDROXY: CPT

## 2024-02-28 PROCEDURE — 80061 LIPID PANEL: CPT

## 2024-02-28 PROCEDURE — 84443 ASSAY THYROID STIM HORMONE: CPT

## 2024-02-28 PROCEDURE — 80053 COMPREHEN METABOLIC PANEL: CPT

## 2024-02-29 ENCOUNTER — OFFICE VISIT (OUTPATIENT)
Dept: PRIMARY CARE | Facility: CLINIC | Age: 54
End: 2024-02-29
Payer: COMMERCIAL

## 2024-02-29 VITALS
HEIGHT: 67 IN | DIASTOLIC BLOOD PRESSURE: 80 MMHG | RESPIRATION RATE: 16 BRPM | OXYGEN SATURATION: 98 % | BODY MASS INDEX: 29.03 KG/M2 | WEIGHT: 185 LBS | HEART RATE: 59 BPM | TEMPERATURE: 97.7 F | SYSTOLIC BLOOD PRESSURE: 117 MMHG

## 2024-02-29 DIAGNOSIS — Z11.4 SCREENING FOR HUMAN IMMUNODEFICIENCY VIRUS WITHOUT PRESENCE OF RISK FACTORS: ICD-10-CM

## 2024-02-29 DIAGNOSIS — M54.50 BILATERAL LOW BACK PAIN WITHOUT SCIATICA, UNSPECIFIED CHRONICITY: ICD-10-CM

## 2024-02-29 DIAGNOSIS — E66.3 OVERWEIGHT WITH BODY MASS INDEX (BMI) OF 28 TO 28.9 IN ADULT: ICD-10-CM

## 2024-02-29 DIAGNOSIS — N95.9 POSTMENOPAUSAL SYMPTOMS: Primary | ICD-10-CM

## 2024-02-29 DIAGNOSIS — Z11.59 ENCOUNTER FOR HEPATITIS C SCREENING TEST FOR LOW RISK PATIENT: ICD-10-CM

## 2024-02-29 LAB
HCV AB SER QL: NONREACTIVE
HIV 1+2 AB+HIV1 P24 AG SERPL QL IA: NONREACTIVE

## 2024-02-29 PROCEDURE — 3008F BODY MASS INDEX DOCD: CPT | Performed by: FAMILY MEDICINE

## 2024-02-29 PROCEDURE — 1036F TOBACCO NON-USER: CPT | Performed by: FAMILY MEDICINE

## 2024-02-29 PROCEDURE — 99214 OFFICE O/P EST MOD 30 MIN: CPT | Performed by: FAMILY MEDICINE

## 2024-02-29 RX ORDER — NALTREXONE HYDROCHLORIDE AND BUPROPION HYDROCHLORIDE 8; 90 MG/1; MG/1
2 TABLET, EXTENDED RELEASE ORAL 2 TIMES DAILY
Qty: 120 TABLET | Refills: 11 | Status: SHIPPED | OUTPATIENT
Start: 2024-02-29 | End: 2024-04-13 | Stop reason: ALTCHOICE

## 2024-02-29 ASSESSMENT — ENCOUNTER SYMPTOMS
CONSTITUTIONAL NEGATIVE: 1
NEUROLOGICAL NEGATIVE: 1
BACK PAIN: 1
RESPIRATORY NEGATIVE: 1
HEMATOLOGIC/LYMPHATIC NEGATIVE: 1
MYALGIAS: 1
ENDOCRINE NEGATIVE: 1
ALLERGIC/IMMUNOLOGIC NEGATIVE: 1
PSYCHIATRIC NEGATIVE: 1
CARDIOVASCULAR NEGATIVE: 1
ARTHRALGIAS: 1
GASTROINTESTINAL NEGATIVE: 1

## 2024-02-29 NOTE — PROGRESS NOTES
"Subjective   Patient ID: Chyna Lorenzo is a 53 y.o. female who presents for 1 MONTH FOLLOW UP (To review labs).    HPI     Review of Systems    Objective   /80 (BP Location: Left arm, Patient Position: Sitting, BP Cuff Size: Large adult)   Pulse 59   Temp 36.5 °C (97.7 °F) (Temporal)   Resp 16   Ht 1.702 m (5' 7\")   Wt 83.9 kg (185 lb)   SpO2 98%   BMI 28.98 kg/m²     Physical Exam    Assessment/Plan          "

## 2024-02-29 NOTE — PROGRESS NOTES
"Subjective   Patient ID: Chyna Lorenzo is a 53 y.o. female who presents for 1 MONTH FOLLOW UP (To review labs).    Patient comes to clinic for lab work review. Pt reports bilateral low back pain for the last 6 weeks and continued knee pain. Pt follows with orthopedic for knee. Back pain 4/10, worse at night. Some relief with motrin. Pt denies paresthesia, weakness, changes in bladder or bowel habits.           Review of Systems   Constitutional: Negative.    HENT: Negative.     Respiratory: Negative.     Cardiovascular: Negative.    Gastrointestinal: Negative.    Endocrine: Negative.    Genitourinary: Negative.    Musculoskeletal:  Positive for arthralgias, back pain and myalgias. Negative for gait problem.        L knee pain   Allergic/Immunologic: Negative.    Neurological: Negative.    Hematological: Negative.    Psychiatric/Behavioral: Negative.         Objective   /80 (BP Location: Left arm, Patient Position: Sitting, BP Cuff Size: Large adult)   Pulse 59   Temp 36.5 °C (97.7 °F) (Temporal)   Resp 16   Ht 1.702 m (5' 7\")   Wt 83.9 kg (185 lb)   SpO2 98%   BMI 28.98 kg/m²     Physical Exam  Cardiovascular:      Rate and Rhythm: Normal rate and regular rhythm.      Pulses: Normal pulses.      Heart sounds: Normal heart sounds.   Pulmonary:      Effort: Pulmonary effort is normal.      Breath sounds: Normal breath sounds.   Abdominal:      General: Abdomen is flat.      Palpations: Abdomen is soft.   Neurological:      General: No focal deficit present.         Assessment/Plan   Diagnoses and all orders for this visit:  Postmenopausal symptoms  -     Follow Up [facility] - PCP - Established  -     Follow [facility] - PCP - Established; Future  Overweight with body mass index (BMI) of 28 to 28.9 in adult  -     Follow Up [facility] - PCP - Established  -     naltrexone-bupropion (Contrave) 8-90 mg ER tablet; Take 2 tablets by mouth 2 times a day.  -     " Follow Up [facility] - PCP - Established; Future  Encounter for hepatitis C screening test for low risk patient  -     Hepatitis C antibody; Future  -     Follow Up [facility] - PCP - Established; Future  Screening for human immunodeficiency virus without presence of risk factors  -     HIV 1/2 Antigen/Antibody Screen with Reflex to Confirmation; Future  -     Follow Up In [facility] - PCP - Established; Future  Bilateral low back pain without sciatica, unspecified chronicity  Patient was seen and evaluated with student. I was present for critical portion of history and physical exam. I reviewed note with student and agree with findings as written  TJW DO  As far as back pain recommend utilize topical therapy i.e. Bengay, IcyHot, Biofreeze, mineral ice, blue ice excetra.  Reviewed labs with patient all normal with the exception of LDL slightly elevated.  Diet, exercise and weight loss discussed.  Extensive discussion regarding weight loss options discussed.  After discussion patient elected for trial of Contrave I instructed her to start 1 tablet daily x 1 week then 1 tablet twice daily x 1 week then 2 in the morning and 1 in the evening x 1 week then 2 tablets twice daily thereafter.  We will have her follow-up in 1 month's time to evaluate effect.

## 2024-02-29 NOTE — PATIENT INSTRUCTIONS
BMI was above normal measurement. Current weight: 83.9 kg (185 lb)  Weight change since last visit (-) denotes wt loss 4 lbs   Weight loss needed to achieve BMI 25: 25.7 Lbs  Weight loss needed to achieve BMI 30: -6.1 Lbs  Provided instructions on dietary changes  Provided instructions on exercise  Advised to Increase physical activity.

## 2024-03-22 ENCOUNTER — TELEPHONE (OUTPATIENT)
Dept: PRIMARY CARE | Facility: CLINIC | Age: 54
End: 2024-03-22
Payer: COMMERCIAL

## 2024-03-27 ENCOUNTER — TELEPHONE (OUTPATIENT)
Dept: PRIMARY CARE | Facility: CLINIC | Age: 54
End: 2024-03-27
Payer: COMMERCIAL

## 2024-03-27 NOTE — TELEPHONE ENCOUNTER
Patient phones the office today w/ request to renew her Prempro 0.625-2.5mg 1 TAB every day to be sent to [facility] on [address] in [place].     Thank you.

## 2024-03-27 NOTE — TELEPHONE ENCOUNTER
Patient phones the office today w/ request to renew her Prempro 0.625-2.5mg 1 TAB every day to be sent to [facility] on [address] in [place].     Thank you.

## 2024-03-28 DIAGNOSIS — N95.9 POSTMENOPAUSAL SYMPTOMS: ICD-10-CM

## 2024-03-29 RX ORDER — CONJUGATED ESTROGENS AND MEDROXYPROGESTERONE ACETATE .625; 2.5 MG/1; MG/1
1 TABLET, SUGAR COATED ORAL DAILY
Qty: 28 TABLET | Refills: 0 | Status: SHIPPED | OUTPATIENT
Start: 2024-03-29 | End: 2024-05-09 | Stop reason: SDUPTHER

## 2024-04-04 ENCOUNTER — TELEPHONE (OUTPATIENT)
Dept: PRIMARY CARE | Facility: CLINIC | Age: 54
End: 2024-04-04
Payer: COMMERCIAL

## 2024-04-04 NOTE — TELEPHONE ENCOUNTER
Prior authorization was denied for Contrave. Spoke to patient to notify of denial. Patient is to follow-up with PCP, and continue all other medications as prescribed. Advised patient to call [facility] at [phone] if any additional assistance is needed.

## 2024-04-13 ENCOUNTER — OFFICE VISIT (OUTPATIENT)
Dept: PRIMARY CARE | Facility: CLINIC | Age: 54
End: 2024-04-13
Payer: COMMERCIAL

## 2024-04-13 VITALS
BODY MASS INDEX: 29.51 KG/M2 | TEMPERATURE: 97.2 F | SYSTOLIC BLOOD PRESSURE: 125 MMHG | DIASTOLIC BLOOD PRESSURE: 82 MMHG | HEART RATE: 69 BPM | HEIGHT: 67 IN | WEIGHT: 188 LBS | OXYGEN SATURATION: 97 % | RESPIRATION RATE: 18 BRPM

## 2024-04-13 DIAGNOSIS — F51.02 ADJUSTMENT INSOMNIA: ICD-10-CM

## 2024-04-13 DIAGNOSIS — E66.3 OVERWEIGHT WITH BODY MASS INDEX (BMI) OF 28 TO 28.9 IN ADULT: Primary | ICD-10-CM

## 2024-04-13 DIAGNOSIS — Z11.4 SCREENING FOR HUMAN IMMUNODEFICIENCY VIRUS WITHOUT PRESENCE OF RISK FACTORS: ICD-10-CM

## 2024-04-13 DIAGNOSIS — F41.9 ANXIETY: ICD-10-CM

## 2024-04-13 DIAGNOSIS — B00.9 HERPES SIMPLEX: ICD-10-CM

## 2024-04-13 DIAGNOSIS — F32.A DEPRESSION, UNSPECIFIED DEPRESSION TYPE: ICD-10-CM

## 2024-04-13 DIAGNOSIS — Z11.59 ENCOUNTER FOR HEPATITIS C SCREENING TEST FOR LOW RISK PATIENT: ICD-10-CM

## 2024-04-13 PROCEDURE — 3008F BODY MASS INDEX DOCD: CPT | Performed by: FAMILY MEDICINE

## 2024-04-13 PROCEDURE — 1036F TOBACCO NON-USER: CPT | Performed by: FAMILY MEDICINE

## 2024-04-13 PROCEDURE — 99214 OFFICE O/P EST MOD 30 MIN: CPT | Performed by: FAMILY MEDICINE

## 2024-04-13 RX ORDER — ACYCLOVIR 400 MG/1
400 TABLET ORAL
Qty: 35 TABLET | Refills: 1 | Status: SHIPPED | OUTPATIENT
Start: 2024-04-13 | End: 2024-04-27

## 2024-04-13 RX ORDER — TRAZODONE HYDROCHLORIDE 50 MG/1
50 TABLET ORAL NIGHTLY
Qty: 30 TABLET | Refills: 5 | Status: SHIPPED | OUTPATIENT
Start: 2024-04-13 | End: 2024-05-06 | Stop reason: SDUPTHER

## 2024-04-13 ASSESSMENT — ENCOUNTER SYMPTOMS
ARTHRALGIAS: 1
MALAISE: 1
HEMATOLOGIC/LYMPHATIC NEGATIVE: 1
FATIGUE: 1
RESPIRATORY NEGATIVE: 1
SLEEP QUALITY: NON-RESTORATIVE
RESTLESSNESS: 1
NERVOUS/ANXIOUS: 1
ALLERGIC/IMMUNOLOGIC NEGATIVE: 1
NIGHTTIME AWAKENINGS: ONE TO TWO
INSOMNIA: 1
FEELINGS OF WORTHLESSNESS: 1
WEIGHT LOSS: 0
WEIGHT GAIN: 0
ANHEDONIA: 1
DECREASED CONCENTRATION: 1
CARDIOVASCULAR NEGATIVE: 1
PSYCHOMOTOR RETARDATION: 1
ENDOCRINE NEGATIVE: 1
IRRITABILITY: 1
GASTROINTESTINAL NEGATIVE: 1
NEUROLOGICAL NEGATIVE: 1
MYALGIAS: 1
DEPRESSION: 1
BACK PAIN: 1
HOPELESSNESS: 1
DEPRESSED MOOD: 1

## 2024-04-13 NOTE — PATIENT INSTRUCTIONS
BMI was above normal measurement. Current weight: 85.3 kg (188 lb)  Weight change since last visit (-) denotes wt loss 3 lbs   Weight loss needed to achieve BMI 25: 28.7 Lbs  Weight loss needed to achieve BMI 30: -3.1 Lbs  Provided instructions on dietary changes  Provided instructions on exercise  Advised to Increase physical activity.

## 2024-04-13 NOTE — PROGRESS NOTES
Subjective   Patient ID: Chyna Lorenzo is a 53 y.o. female who presents for Weight Management (Pt. States she stopped taking Contrave because it gave her stomach issues x 3 weeks ), Anxiety (Worse in past 3 months ), and Depression.        Anxiety  Presents for follow-up visit. Symptoms include decreased concentration, depressed mood, excessive worry, insomnia, irritability, malaise, nervous/anxious behavior and restlessness. Patient reports no suicidal ideas. Symptoms occur constantly. The severity of symptoms is causing significant distress.       Depression  Visit Type: follow-up  Patient presents with the following symptoms: anhedonia, decreased concentration, depressed mood, excessive worry, fatigue, feelings of hopelessness, feelings of worthlessness, insomnia, irritability, malaise, nervousness/anxiety, psychomotor retardation and restlessness.  Patient is not experiencing: suicidal ideas, weight gain and weight loss.  Frequency of symptoms: constantly    Sleep quality: non-restorative  Nighttime awakenings: one to two    Insomnia  This is a chronic problem. The current episode started more than 1 month ago. The problem occurs daily. The problem has been gradually worsening. Associated symptoms include arthralgias, fatigue and myalgias. The symptoms are aggravated by stress. She has tried rest for the symptoms. The treatment provided no relief.        Review of Systems   Constitutional:  Positive for fatigue and irritability. Negative for weight gain and weight loss.   HENT: Negative.     Respiratory: Negative.     Cardiovascular: Negative.    Gastrointestinal: Negative.    Endocrine: Negative.    Genitourinary: Negative.    Musculoskeletal:  Positive for arthralgias, back pain and myalgias. Negative for gait problem.        L knee pain   Allergic/Immunologic: Negative.    Neurological: Negative.    Hematological: Negative.    Psychiatric/Behavioral:  Positive for decreased concentration and depression.  "Negative for suicidal ideas. The patient is nervous/anxious and has insomnia.        Objective   /82 (BP Location: Right arm, Patient Position: Sitting, BP Cuff Size: Large adult)   Pulse 69   Temp 36.2 °C (97.2 °F) (Temporal)   Resp 18   Ht 1.702 m (5' 7\")   Wt 85.3 kg (188 lb)   SpO2 97%   BMI 29.44 kg/m²     Physical Exam  Constitutional:       General: She is not in acute distress.     Appearance: Normal appearance. She is not ill-appearing or diaphoretic.   HENT:      Head: Normocephalic and atraumatic.      Right Ear: External ear normal.      Left Ear: External ear normal.      Nose: Nose normal. No rhinorrhea.   Eyes:      General: Lids are normal. No scleral icterus.        Right eye: No discharge.         Left eye: No discharge.      Conjunctiva/sclera: Conjunctivae normal.   Cardiovascular:      Rate and Rhythm: Normal rate and regular rhythm.      Pulses: Normal pulses.      Heart sounds: Normal heart sounds. No murmur heard.  Pulmonary:      Effort: Pulmonary effort is normal. No respiratory distress.      Breath sounds: Normal breath sounds. No decreased breath sounds, wheezing, rhonchi or rales.   Abdominal:      General: Abdomen is flat. Bowel sounds are normal. There is no distension.      Palpations: Abdomen is soft. There is no mass.      Tenderness: There is no abdominal tenderness. There is no guarding or rebound.   Musculoskeletal:         General: No swelling or tenderness.      Cervical back: No rigidity or tenderness.      Right lower leg: No edema.      Left lower leg: No edema.      Comments: Bilateral knee arthritic deformities   Lymphadenopathy:      Cervical: No cervical adenopathy.      Upper Body:      Right upper body: No supraclavicular adenopathy.      Left upper body: No supraclavicular adenopathy.   Skin:     General: Skin is warm and dry.      Coloration: Skin is not jaundiced or pale.      Findings: No erythema, lesion or rash.   Neurological:      General: No " focal deficit present.      Mental Status: She is alert and oriented to person, place, and time.      Sensory: No sensory deficit.      Motor: No weakness or tremor.      Coordination: Coordination normal.      Gait: Gait normal.   Psychiatric:         Attention and Perception: Attention normal.         Mood and Affect: Mood is depressed. Affect is not inappropriate.         Speech: Speech normal.         Behavior: Behavior normal. Behavior is cooperative.         Thought Content: Thought content normal.         Cognition and Memory: Cognition and memory normal.         Judgment: Judgment normal.         Assessment/Plan   Diagnoses and all orders for this visit:  Overweight with body mass index (BMI) of 28 to 28.9 in adult  -     Follow Up In [facility] - PCP - Established  -     methylcellulose, laxative, powder; Take 1 Scoop by mouth 2 times a day.  Encounter for hepatitis C screening test for low risk patient  -     Follow Up In [facility] - PCP - Established  Screening for human immunodeficiency virus without presence of risk factors  -     Follow Up In [facility] - PCP - Established  Herpes simplex  -     acyclovir (Zovirax) 400 mg tablet; Take 1 tablet (400 mg) by mouth 5 times a day for 14 days.  Anxiety  -     traZODone (Desyrel) 50 mg tablet; Take 1 tablet (50 mg) by mouth once daily at bedtime.  -     Follow Up In [facility] - PCP - Established; Future  Depression, unspecified depression type  -     traZODone (Desyrel) 50 mg tablet; Take 1 tablet (50 mg) by mouth once daily at bedtime.  -     Follow Up In [facility] - PCP - Established; Future  Adjustment insomnia  -     traZODone (Desyrel) 50 mg tablet; Take 1 tablet (50 mg) by mouth once daily at bedtime.  -     Follow Up In [facility] - PCP - Established; Future  BMI was above normal measurement. Current weight: 85.3 kg (188 lb)  Weight change since last visit (-) denotes wt loss 3 lbs    Weight loss needed to achieve BMI 25: 28.7 Lbs  Weight loss needed to achieve BMI 30: -3.1 Lbs  Provided instructions on dietary changes  Provided instructions on exercise  Advised to Increase physical activity.

## 2024-04-15 ENCOUNTER — TELEPHONE (OUTPATIENT)
Dept: PRIMARY CARE | Facility: CLINIC | Age: 54
End: 2024-04-15
Payer: COMMERCIAL

## 2024-04-15 ASSESSMENT — ANXIETY QUESTIONNAIRES
4. TROUBLE RELAXING: NEARLY EVERY DAY
GAD7 TOTAL SCORE: 16
2. NOT BEING ABLE TO STOP OR CONTROL WORRYING: NEARLY EVERY DAY
3. WORRYING TOO MUCH ABOUT DIFFERENT THINGS: NEARLY EVERY DAY
5. BEING SO RESTLESS THAT IT IS HARD TO SIT STILL: NOT AT ALL
7. FEELING AFRAID AS IF SOMETHING AWFUL MIGHT HAPPEN: SEVERAL DAYS
1. FEELING NERVOUS, ANXIOUS, OR ON EDGE: NEARLY EVERY DAY
6. BECOMING EASILY ANNOYED OR IRRITABLE: NEARLY EVERY DAY
IF YOU CHECKED OFF ANY PROBLEMS ON THIS QUESTIONNAIRE, HOW DIFFICULT HAVE THESE PROBLEMS MADE IT FOR YOU TO DO YOUR WORK, TAKE CARE OF THINGS AT HOME, OR GET ALONG WITH OTHER PEOPLE: VERY DIFFICULT

## 2024-04-15 ASSESSMENT — PATIENT HEALTH QUESTIONNAIRE - PHQ9
4. FEELING TIRED OR HAVING LITTLE ENERGY: MORE THAN HALF THE DAYS
6. FEELING BAD ABOUT YOURSELF - OR THAT YOU ARE A FAILURE OR HAVE LET YOURSELF OR YOUR FAMILY DOWN: MORE THAN HALF THE DAYS
5. POOR APPETITE OR OVEREATING: MORE THAN HALF THE DAYS
2. FEELING DOWN, DEPRESSED OR HOPELESS: SEVERAL DAYS
9. THOUGHTS THAT YOU WOULD BE BETTER OFF DEAD, OR OF HURTING YOURSELF: NOT AT ALL
3. TROUBLE FALLING OR STAYING ASLEEP OR SLEEPING TOO MUCH: MORE THAN HALF THE DAYS
SUM OF ALL RESPONSES TO PHQ9 QUESTIONS 1 AND 2: 3
8. MOVING OR SPEAKING SO SLOWLY THAT OTHER PEOPLE COULD HAVE NOTICED. OR THE OPPOSITE, BEING SO FIGETY OR RESTLESS THAT YOU HAVE BEEN MOVING AROUND A LOT MORE THAN USUAL: SEVERAL DAYS
7. TROUBLE CONCENTRATING ON THINGS, SUCH AS READING THE NEWSPAPER OR WATCHING TELEVISION: MORE THAN HALF THE DAYS
SUM OF ALL RESPONSES TO PHQ QUESTIONS 1-9: 14
1. LITTLE INTEREST OR PLEASURE IN DOING THINGS: MORE THAN HALF THE DAYS

## 2024-04-15 NOTE — TELEPHONE ENCOUNTER
Pharmacy called and needs clarification on Acyclovir 400mg.  It says to take for 14 days but only shows 35 tablets.  It needs to be 70 tablets to do 14 days.  Please send new script over or call pharmacy .    [facility] -  [place] [phone]

## 2024-04-25 ENCOUNTER — OFFICE VISIT (OUTPATIENT)
Dept: ORTHOPEDIC SURGERY | Facility: CLINIC | Age: 54
End: 2024-04-25
Payer: COMMERCIAL

## 2024-04-25 DIAGNOSIS — M17.0 PRIMARY OSTEOARTHRITIS OF BOTH KNEES: Primary | ICD-10-CM

## 2024-04-25 PROCEDURE — 99214 OFFICE O/P EST MOD 30 MIN: CPT | Performed by: ORTHOPAEDIC SURGERY

## 2024-04-25 PROCEDURE — 3008F BODY MASS INDEX DOCD: CPT | Performed by: ORTHOPAEDIC SURGERY

## 2024-04-25 PROCEDURE — 99213 OFFICE O/P EST LOW 20 MIN: CPT | Performed by: ORTHOPAEDIC SURGERY

## 2024-04-25 NOTE — PROGRESS NOTES
History of present illness: Patient with a history of bilateral knee severe patellofemoral arthrosis    She has done a year or 2 of conservative treatment injections gels now inability to walk ambulate she really cannot get up and down or get off her legs or flex behind her due to the severe patellofemoral crepitus both knees are severely bad    Her alignment has been maintained she has done excellent with rehab but now she is seeking knee replacement    Physical exam:    General: No acute distress or breathing difficulty or discomfort, pleasant and cooperative with the examination.    Extremities: The affected left knee was examined and inspected and was tender to touch along the medial and lateral aspect with catching, locking or mechanical symptoms.    The skin was intact without breakdown or open wound.  Old incisions of present were healed.    There was a mild Jeffrey exam seen with some evidence of instability and weakness in the collateral ligaments with varus valgus stressing and laxity in the anterior or posterior planes.    There was a negative Lachman's test pivot shift test and posterior drawer sign with no foot drop, numbness or tingling.    Sensation, reflexes and pulses in the foot and ankle are preserved.  There was an effusion.  Range of motion showed good straight leg raise with flexion to 150 degrees and extension to 0 degrees.  The patient had the ability to bear weight but with discomfort.  The patient's gait was antalgic secondary to discomfort    The affected right knee was examined and inspected and was tender to touch along the medial and lateral aspect with catching, locking or mechanical symptoms.    The skin was intact without breakdown or open wound.  Old incisions of present were healed.    There was a mild Jeffrey exam seen with some evidence of instability and weakness in the collateral ligaments with varus valgus stressing and laxity in the anterior or posterior planes.    There  was a negative Lachman's test pivot shift test and posterior drawer sign with no foot drop, numbness or tingling.    Sensation, reflexes and pulses in the foot and ankle are preserved.  There was an effusion.  Range of motion showed good straight leg raise with flexion to 150 degrees and extension to 0 degrees.  The patient had the ability to bear weight but with discomfort.  The patient's gait was antalgic secondary to discomfort    Diagnostic studies: No new x-rays    Impression: Severe patellofemoral arthrosis bilateral knees    Plan: Now for knee replacement    Alternatives discussed    Additional injections gel shots PT therapy bracing program discussed    Patient understands risk and benefits of surgery    Risk and benefits of surgery discussed extensively with the patient.    Surgical risk included but were not limited to infection, wear, loosening, need for further surgery blood clot, failure to heal, failure of the surgery, stiffness, loss of limb life, extremity function change in length change, and associated risks of surgery during the coronavirus epidemic.    Patient also would like to proceed with bilateral knee replacement and she understands she may need to be rehab placed for postoperative care due to the double surgeries and the albeit slightly increased medical risk she will probably be on oral Xarelto for DVT prophylaxis due to the fact that she is having both knees at the same time

## 2024-05-06 ENCOUNTER — LAB (OUTPATIENT)
Dept: LAB | Facility: LAB | Age: 54
End: 2024-05-06
Payer: COMMERCIAL

## 2024-05-06 ENCOUNTER — OFFICE VISIT (OUTPATIENT)
Dept: PRIMARY CARE | Facility: CLINIC | Age: 54
End: 2024-05-06
Payer: COMMERCIAL

## 2024-05-06 ENCOUNTER — TELEPHONE (OUTPATIENT)
Dept: ORTHOPEDIC SURGERY | Facility: CLINIC | Age: 54
End: 2024-05-06

## 2024-05-06 VITALS
RESPIRATION RATE: 18 BRPM | BODY MASS INDEX: 29.19 KG/M2 | OXYGEN SATURATION: 97 % | SYSTOLIC BLOOD PRESSURE: 126 MMHG | TEMPERATURE: 97.7 F | WEIGHT: 186 LBS | HEART RATE: 68 BPM | DIASTOLIC BLOOD PRESSURE: 83 MMHG | HEIGHT: 67 IN

## 2024-05-06 DIAGNOSIS — F51.02 ADJUSTMENT INSOMNIA: ICD-10-CM

## 2024-05-06 DIAGNOSIS — F40.243 FEAR OF FLYING: ICD-10-CM

## 2024-05-06 DIAGNOSIS — F32.A DEPRESSION, UNSPECIFIED DEPRESSION TYPE: ICD-10-CM

## 2024-05-06 DIAGNOSIS — M79.18 MYALGIA, MULTIPLE SITES: ICD-10-CM

## 2024-05-06 DIAGNOSIS — M25.50 ARTHRALGIA, UNSPECIFIED JOINT: ICD-10-CM

## 2024-05-06 DIAGNOSIS — K21.9 GASTROESOPHAGEAL REFLUX DISEASE, UNSPECIFIED WHETHER ESOPHAGITIS PRESENT: ICD-10-CM

## 2024-05-06 DIAGNOSIS — M25.551 PAIN OF RIGHT HIP: ICD-10-CM

## 2024-05-06 DIAGNOSIS — R52 MIGRATORY PAIN: Primary | ICD-10-CM

## 2024-05-06 DIAGNOSIS — M17.0 PRIMARY OSTEOARTHRITIS OF BOTH KNEES: ICD-10-CM

## 2024-05-06 DIAGNOSIS — R52 MIGRATORY PAIN: ICD-10-CM

## 2024-05-06 DIAGNOSIS — Z00.00 ROUTINE GENERAL MEDICAL EXAMINATION AT A HEALTH CARE FACILITY: ICD-10-CM

## 2024-05-06 DIAGNOSIS — F41.9 ANXIETY: ICD-10-CM

## 2024-05-06 LAB
BASOPHILS # BLD AUTO: 0.03 X10*3/UL (ref 0–0.1)
BASOPHILS NFR BLD AUTO: 0.4 %
CK SERPL-CCNC: 102 U/L (ref 0–215)
CRP SERPL-MCNC: 0.48 MG/DL
EOSINOPHIL # BLD AUTO: 0.15 X10*3/UL (ref 0–0.7)
EOSINOPHIL NFR BLD AUTO: 2.2 %
ERYTHROCYTE [DISTWIDTH] IN BLOOD BY AUTOMATED COUNT: 12.3 % (ref 11.5–14.5)
ERYTHROCYTE [SEDIMENTATION RATE] IN BLOOD BY WESTERGREN METHOD: 3 MM/H (ref 0–30)
HCT VFR BLD AUTO: 41.8 % (ref 36–46)
HGB BLD-MCNC: 14.1 G/DL (ref 12–16)
IMM GRANULOCYTES # BLD AUTO: 0.02 X10*3/UL (ref 0–0.7)
IMM GRANULOCYTES NFR BLD AUTO: 0.3 % (ref 0–0.9)
LYMPHOCYTES # BLD AUTO: 2.26 X10*3/UL (ref 1.2–4.8)
LYMPHOCYTES NFR BLD AUTO: 33.8 %
MCH RBC QN AUTO: 30.7 PG (ref 26–34)
MCHC RBC AUTO-ENTMCNC: 33.7 G/DL (ref 32–36)
MCV RBC AUTO: 91 FL (ref 80–100)
MONOCYTES # BLD AUTO: 0.55 X10*3/UL (ref 0.1–1)
MONOCYTES NFR BLD AUTO: 8.2 %
NEUTROPHILS # BLD AUTO: 3.67 X10*3/UL (ref 1.2–7.7)
NEUTROPHILS NFR BLD AUTO: 55.1 %
NRBC BLD-RTO: 0 /100 WBCS (ref 0–0)
PLATELET # BLD AUTO: 306 X10*3/UL (ref 150–450)
RBC # BLD AUTO: 4.59 X10*6/UL (ref 4–5.2)
URATE SERPL-MCNC: 3.7 MG/DL (ref 2.3–6.7)
WBC # BLD AUTO: 6.7 X10*3/UL (ref 4.4–11.3)

## 2024-05-06 PROCEDURE — 85652 RBC SED RATE AUTOMATED: CPT

## 2024-05-06 PROCEDURE — 84550 ASSAY OF BLOOD/URIC ACID: CPT

## 2024-05-06 PROCEDURE — 85025 COMPLETE CBC W/AUTO DIFF WBC: CPT

## 2024-05-06 PROCEDURE — 86038 ANTINUCLEAR ANTIBODIES: CPT

## 2024-05-06 PROCEDURE — 3008F BODY MASS INDEX DOCD: CPT | Performed by: FAMILY MEDICINE

## 2024-05-06 PROCEDURE — 99214 OFFICE O/P EST MOD 30 MIN: CPT | Performed by: FAMILY MEDICINE

## 2024-05-06 PROCEDURE — 1036F TOBACCO NON-USER: CPT | Performed by: FAMILY MEDICINE

## 2024-05-06 PROCEDURE — 36415 COLL VENOUS BLD VENIPUNCTURE: CPT

## 2024-05-06 PROCEDURE — 82550 ASSAY OF CK (CPK): CPT

## 2024-05-06 PROCEDURE — 86431 RHEUMATOID FACTOR QUANT: CPT

## 2024-05-06 PROCEDURE — 86140 C-REACTIVE PROTEIN: CPT

## 2024-05-06 RX ORDER — MELOXICAM 15 MG/1
15 TABLET ORAL
Qty: 30 TABLET | Refills: 0 | Status: SHIPPED | OUTPATIENT
Start: 2024-05-06

## 2024-05-06 RX ORDER — METHYLPREDNISOLONE 4 MG/1
TABLET ORAL
Qty: 21 TABLET | Refills: 0 | Status: SHIPPED | OUTPATIENT
Start: 2024-05-06 | End: 2024-05-13

## 2024-05-06 RX ORDER — DICLOFENAC SODIUM 50 MG/1
50 TABLET, DELAYED RELEASE ORAL 3 TIMES DAILY PRN
Qty: 90 TABLET | Refills: 0 | Status: SHIPPED | OUTPATIENT
Start: 2024-05-06 | End: 2025-05-06

## 2024-05-06 RX ORDER — ALPRAZOLAM 0.5 MG/1
TABLET ORAL
Qty: 8 TABLET | Refills: 0 | Status: SHIPPED | OUTPATIENT
Start: 2024-05-06

## 2024-05-06 RX ORDER — TRAZODONE HYDROCHLORIDE 100 MG/1
100 TABLET ORAL NIGHTLY
Qty: 90 TABLET | Refills: 3 | Status: SHIPPED | OUTPATIENT
Start: 2024-05-06 | End: 2025-05-01

## 2024-05-06 ASSESSMENT — ENCOUNTER SYMPTOMS
RESPIRATORY NEGATIVE: 1
ENDOCRINE NEGATIVE: 1
FATIGUE: 1
HEMATOLOGIC/LYMPHATIC NEGATIVE: 1
DEPRESSED MOOD: 1
GASTROINTESTINAL NEGATIVE: 1
NEUROLOGICAL NEGATIVE: 1
BACK PAIN: 1
NERVOUS/ANXIOUS: 1
IRRITABILITY: 1
DECREASED CONCENTRATION: 1
ARTHRALGIAS: 1
MYALGIAS: 1
ALLERGIC/IMMUNOLOGIC NEGATIVE: 1
MUSCLE TENSION: 1
CARDIOVASCULAR NEGATIVE: 1
INSOMNIA: 1

## 2024-05-06 NOTE — PROGRESS NOTES
Subjective   Patient ID: Chyna Lorenzo is a 53 y.o. female who presents for Medication Visit and Joint Pain (All over x 3 weeks ).    Patient comes into the office today for follow-up on insomnia, anxiety and depression.  She has gotten significant improvement with trazodone in terms of her sleep and anxiety she is wondering however if she can increase the dosage as she feels that she can get a little bit more improvement of note however she has been having some diffuse myalgias and arthralgias over the past week that is new and it is not associated with any prior trauma or change in activity.  She denies any fever or chills.  She is exposed to frequent viral infections at work.  She states that the arthralgias are different from the chronic knee issues that she has.    Anxiety  Presents for follow-up visit. Symptoms include decreased concentration, depressed mood, excessive worry, insomnia, irritability, muscle tension and nervous/anxious behavior. Symptoms occur most days. The severity of symptoms is moderate. The quality of sleep is fair.       Insomnia  This is a chronic problem. The current episode started more than 1 month ago. The problem occurs daily. The problem has been rapidly improving. Associated symptoms include arthralgias, fatigue and myalgias. The symptoms are aggravated by stress. She has tried rest for the symptoms. The treatment provided no relief.        Review of Systems   Constitutional:  Positive for fatigue and irritability.   HENT: Negative.     Respiratory: Negative.     Cardiovascular: Negative.    Gastrointestinal: Negative.    Endocrine: Negative.    Genitourinary: Negative.    Musculoskeletal:  Positive for arthralgias, back pain and myalgias. Negative for gait problem.        L knee pain   Allergic/Immunologic: Negative.    Neurological: Negative.    Hematological: Negative.    Psychiatric/Behavioral:  Positive for decreased concentration. The patient is nervous/anxious and has  "insomnia.        Objective   /83 (BP Location: Right arm, Patient Position: Sitting, BP Cuff Size: Large adult)   Pulse 68   Temp 36.5 °C (97.7 °F) (Temporal)   Resp 18   Ht 1.702 m (5' 7\")   Wt 84.4 kg (186 lb)   SpO2 97%   BMI 29.13 kg/m²     Physical Exam  Constitutional:       General: She is not in acute distress.     Appearance: Normal appearance. She is not ill-appearing or diaphoretic.   HENT:      Head: Normocephalic and atraumatic.      Right Ear: External ear normal.      Left Ear: External ear normal.      Nose: Nose normal. No rhinorrhea.   Eyes:      General: Lids are normal. No scleral icterus.        Right eye: No discharge.         Left eye: No discharge.      Conjunctiva/sclera: Conjunctivae normal.   Cardiovascular:      Rate and Rhythm: Normal rate and regular rhythm.      Pulses: Normal pulses.      Heart sounds: Normal heart sounds. No murmur heard.  Pulmonary:      Effort: Pulmonary effort is normal. No respiratory distress.      Breath sounds: Normal breath sounds. No decreased breath sounds, wheezing, rhonchi or rales.   Abdominal:      General: Abdomen is flat. Bowel sounds are normal. There is no distension.      Palpations: Abdomen is soft. There is no mass.      Tenderness: There is no abdominal tenderness. There is no guarding or rebound.   Musculoskeletal:         General: No swelling or tenderness.      Cervical back: No rigidity or tenderness.      Right lower leg: No edema.      Left lower leg: No edema.      Comments: Bilateral knee arthritic deformities   Lymphadenopathy:      Cervical: No cervical adenopathy.      Upper Body:      Right upper body: No supraclavicular adenopathy.      Left upper body: No supraclavicular adenopathy.   Skin:     General: Skin is warm and dry.      Coloration: Skin is not jaundiced or pale.      Findings: No erythema, lesion or rash.   Neurological:      General: No focal deficit present.      Mental Status: She is alert and oriented to " person, place, and time.      Sensory: No sensory deficit.      Motor: No weakness or tremor.      Coordination: Coordination normal.      Gait: Gait normal.   Psychiatric:         Attention and Perception: Attention normal.         Mood and Affect: Mood is depressed. Affect is not inappropriate.         Speech: Speech normal.         Behavior: Behavior normal. Behavior is cooperative.         Thought Content: Thought content normal.         Cognition and Memory: Cognition and memory normal.         Judgment: Judgment normal.         Assessment/Plan   Diagnoses and all orders for this visit:  Migratory pain  -     DAVID with Reflex to IVANIA; Future  -     C-Reactive Protein; Future  -     Creatine Kinase; Future  -     Rheumatoid Factor; Future  -     Sedimentation Rate; Future  -     Uric Acid; Future  -     CBC and Auto Differential; Future  -     diclofenac (Voltaren) 50 mg EC tablet; Take 1 tablet (50 mg) by mouth 3 times a day as needed (pain). Do not crush, chew, or split.  -     methylPREDNISolone (Medrol Dospak) 4 mg tablets; Take as directed on package.  Anxiety  -     Follow Up In [facility] - PCP - Established  -     traZODone (Desyrel) 100 mg tablet; Take 1 tablet (100 mg) by mouth once daily at bedtime.  Depression, unspecified depression type  -     Follow Up In [facility] - PCP - Established  -     traZODone (Desyrel) 100 mg tablet; Take 1 tablet (100 mg) by mouth once daily at bedtime.  Adjustment insomnia  -     Follow Up In [facility] - PCP - Established  -     traZODone (Desyrel) 100 mg tablet; Take 1 tablet (100 mg) by mouth once daily at bedtime.  Arthralgia, unspecified joint  -     DAVID with Reflex to IVANIA; Future  -     C-Reactive Protein; Future  -     Creatine Kinase; Future  -     Rheumatoid Factor; Future  -     Sedimentation Rate; Future  -     Uric Acid; Future  -     CBC and Auto Differential; Future  -     diclofenac (Voltaren) 50 mg EC tablet; Take 1 tablet  (50 mg) by mouth 3 times a day as needed (pain). Do not crush, chew, or split.  -     methylPREDNISolone (Medrol Dospak) 4 mg tablets; Take as directed on package.  Myalgia, multiple sites  -     DAVID with Reflex to IVANIA; Future  -     C-Reactive Protein; Future  -     Creatine Kinase; Future  -     Rheumatoid Factor; Future  -     Sedimentation Rate; Future  -     Uric Acid; Future  -     CBC and Auto Differential; Future  -     diclofenac (Voltaren) 50 mg EC tablet; Take 1 tablet (50 mg) by mouth 3 times a day as needed (pain). Do not crush, chew, or split.  -     methylPREDNISolone (Medrol Dospak) 4 mg tablets; Take as directed on package.  Pain of right hip  -     XR hip right with pelvis when performed 2 or 3 views; Future  -     diclofenac (Voltaren) 50 mg EC tablet; Take 1 tablet (50 mg) by mouth 3 times a day as needed (pain). Do not crush, chew, or split.  -     methylPREDNISolone (Medrol Dospak) 4 mg tablets; Take as directed on package.  Fear of flying  -     ALPRAZolam (Xanax) 0.5 mg tablet; P.o. 1 or 2 tablets 30 minutes before flying  Routine general medical examination at a health care facility  -     CBC and Auto Differential; Future  -     Comprehensive Metabolic Panel; Future  -     Lipid Panel; Future  -     Magnesium; Future  -     TSH with reflex to Free T4 if abnormal; Future  -     Follow Up In Primary Care - Health Maintenance; Future  Gastroesophageal reflux disease, unspecified whether esophagitis present  -     CBC and Auto Differential; Future  -     Comprehensive Metabolic Panel; Future  -     Lipid Panel; Future  -     Magnesium; Future  -     TSH with reflex to Free T4 if abnormal; Future  -     Follow Up In [facility] Care - Health Maintenance; Future  Check labs.  If any markers for rheumatologic issues come up then will arrange for appropriate follow-up/referral.  Symptoms however likely secondary to acute/self-limiting viral myalgia and anticipate gradual improvement with conservative  care.  Recommend follow-up in about 9 months with routine labs and return immediately if worsening at any time.

## 2024-05-06 NOTE — TELEPHONE ENCOUNTER
Pt called and would like something for pain, states that tylenol and motrin are not working, pharmacy is correct

## 2024-05-07 LAB — RHEUMATOID FACT SER NEPH-ACNC: <10 IU/ML (ref 0–15)

## 2024-05-09 DIAGNOSIS — N95.9 POSTMENOPAUSAL SYMPTOMS: ICD-10-CM

## 2024-05-09 LAB — ANA SER QL HEP2 SUBST: NEGATIVE

## 2024-05-09 RX ORDER — CONJUGATED ESTROGENS AND MEDROXYPROGESTERONE ACETATE .625; 2.5 MG/1; MG/1
1 TABLET, SUGAR COATED ORAL DAILY
Qty: 90 TABLET | Refills: 2 | Status: SHIPPED | OUTPATIENT
Start: 2024-05-09 | End: 2025-05-09

## 2024-05-09 RX ORDER — CONJUGATED ESTROGENS AND MEDROXYPROGESTERONE ACETATE .625; 2.5 MG/1; MG/1
1 TABLET, SUGAR COATED ORAL DAILY
Qty: 90 TABLET | Refills: 2 | Status: SHIPPED | OUTPATIENT
Start: 2024-05-09 | End: 2024-05-09 | Stop reason: SDUPTHER

## 2024-05-10 ENCOUNTER — TELEPHONE (OUTPATIENT)
Dept: PRIMARY CARE | Facility: CLINIC | Age: 54
End: 2024-05-10
Payer: COMMERCIAL

## 2024-05-11 ENCOUNTER — APPOINTMENT (OUTPATIENT)
Dept: PRIMARY CARE | Facility: CLINIC | Age: 54
End: 2024-05-11
Payer: COMMERCIAL

## 2024-05-29 PROBLEM — M17.9 DJD (DEGENERATIVE JOINT DISEASE) OF KNEE: Status: ACTIVE | Noted: 2024-06-25

## 2024-06-10 DIAGNOSIS — Z01.818 PRE-OPERATIVE CLEARANCE: Primary | ICD-10-CM

## 2024-06-10 RX ORDER — CHLORHEXIDINE GLUCONATE ORAL RINSE 1.2 MG/ML
15 SOLUTION DENTAL AS NEEDED
Qty: 30 ML | Refills: 0 | Status: SHIPPED | OUTPATIENT
Start: 2024-06-10

## 2024-06-11 ENCOUNTER — PRE-ADMISSION TESTING (OUTPATIENT)
Dept: PREADMISSION TESTING | Facility: HOSPITAL | Age: 54
End: 2024-06-11
Payer: COMMERCIAL

## 2024-06-11 VITALS
HEART RATE: 64 BPM | OXYGEN SATURATION: 100 % | HEIGHT: 67 IN | DIASTOLIC BLOOD PRESSURE: 81 MMHG | RESPIRATION RATE: 18 BRPM | SYSTOLIC BLOOD PRESSURE: 142 MMHG | WEIGHT: 190.26 LBS | BODY MASS INDEX: 29.86 KG/M2

## 2024-06-11 DIAGNOSIS — Z01.818 PRE-OP TESTING: ICD-10-CM

## 2024-06-11 LAB
ALBUMIN SERPL BCP-MCNC: 4.2 G/DL (ref 3.4–5)
ALP SERPL-CCNC: 31 U/L (ref 33–110)
ALT SERPL W P-5'-P-CCNC: 17 U/L (ref 7–45)
ANION GAP SERPL CALC-SCNC: 11 MMOL/L (ref 10–20)
AST SERPL W P-5'-P-CCNC: 19 U/L (ref 9–39)
BASOPHILS # BLD AUTO: 0.04 X10*3/UL (ref 0–0.1)
BASOPHILS NFR BLD AUTO: 0.8 %
BILIRUB SERPL-MCNC: 0.3 MG/DL (ref 0–1.2)
BUN SERPL-MCNC: 19 MG/DL (ref 6–23)
CALCIUM SERPL-MCNC: 9 MG/DL (ref 8.6–10.3)
CHLORIDE SERPL-SCNC: 104 MMOL/L (ref 98–107)
CO2 SERPL-SCNC: 28 MMOL/L (ref 21–32)
CREAT SERPL-MCNC: 0.84 MG/DL (ref 0.5–1.05)
EGFRCR SERPLBLD CKD-EPI 2021: 83 ML/MIN/1.73M*2
EOSINOPHIL # BLD AUTO: 0.13 X10*3/UL (ref 0–0.7)
EOSINOPHIL NFR BLD AUTO: 2.6 %
ERYTHROCYTE [DISTWIDTH] IN BLOOD BY AUTOMATED COUNT: 12.2 % (ref 11.5–14.5)
EST. AVERAGE GLUCOSE BLD GHB EST-MCNC: 100 MG/DL
GLUCOSE SERPL-MCNC: 92 MG/DL (ref 74–99)
HBA1C MFR BLD: 5.1 %
HCT VFR BLD AUTO: 37.3 % (ref 36–46)
HGB BLD-MCNC: 12.4 G/DL (ref 12–16)
IMM GRANULOCYTES # BLD AUTO: 0.02 X10*3/UL (ref 0–0.7)
IMM GRANULOCYTES NFR BLD AUTO: 0.4 % (ref 0–0.9)
INR PPP: 1 (ref 0.9–1.1)
LYMPHOCYTES # BLD AUTO: 1.69 X10*3/UL (ref 1.2–4.8)
LYMPHOCYTES NFR BLD AUTO: 33.3 %
MCH RBC QN AUTO: 30.8 PG (ref 26–34)
MCHC RBC AUTO-ENTMCNC: 33.2 G/DL (ref 32–36)
MCV RBC AUTO: 93 FL (ref 80–100)
MONOCYTES # BLD AUTO: 0.32 X10*3/UL (ref 0.1–1)
MONOCYTES NFR BLD AUTO: 6.3 %
NEUTROPHILS # BLD AUTO: 2.88 X10*3/UL (ref 1.2–7.7)
NEUTROPHILS NFR BLD AUTO: 56.6 %
NRBC BLD-RTO: 0 /100 WBCS (ref 0–0)
PLATELET # BLD AUTO: 230 X10*3/UL (ref 150–450)
POTASSIUM SERPL-SCNC: 3.8 MMOL/L (ref 3.5–5.3)
PROT SERPL-MCNC: 6.5 G/DL (ref 6.4–8.2)
PROTHROMBIN TIME: 11.5 SECONDS (ref 9.8–12.8)
RBC # BLD AUTO: 4.03 X10*6/UL (ref 4–5.2)
SODIUM SERPL-SCNC: 139 MMOL/L (ref 136–145)
WBC # BLD AUTO: 5.1 X10*3/UL (ref 4.4–11.3)

## 2024-06-11 PROCEDURE — 85610 PROTHROMBIN TIME: CPT

## 2024-06-11 PROCEDURE — 87081 CULTURE SCREEN ONLY: CPT | Mod: ELYLAB

## 2024-06-11 PROCEDURE — 85025 COMPLETE CBC W/AUTO DIFF WBC: CPT

## 2024-06-11 PROCEDURE — 84075 ASSAY ALKALINE PHOSPHATASE: CPT

## 2024-06-11 PROCEDURE — 83036 HEMOGLOBIN GLYCOSYLATED A1C: CPT | Mod: ELYLAB

## 2024-06-11 PROCEDURE — 36415 COLL VENOUS BLD VENIPUNCTURE: CPT

## 2024-06-11 RX ORDER — ACETAMINOPHEN 500 MG
500 TABLET ORAL EVERY 6 HOURS PRN
COMMUNITY

## 2024-06-11 NOTE — PREPROCEDURE INSTRUCTIONS
Patient here for PAT visit; labs and MRSA swab obtained. CHG solution and joint instruction book given to and reviewed with patient. Written pre-op instructions reviewed with patient. Aware to hold NSAID's prior to procedure. NPO after midnight. Pt to obtain a walker.

## 2024-06-13 ENCOUNTER — OFFICE VISIT (OUTPATIENT)
Dept: ORTHOPEDIC SURGERY | Facility: CLINIC | Age: 54
End: 2024-06-13
Payer: COMMERCIAL

## 2024-06-13 DIAGNOSIS — M17.0 PRIMARY OSTEOARTHRITIS OF BOTH KNEES: ICD-10-CM

## 2024-06-13 LAB — STAPHYLOCOCCUS SPEC CULT: NORMAL

## 2024-06-13 PROCEDURE — 3008F BODY MASS INDEX DOCD: CPT | Performed by: PHYSICIAN ASSISTANT

## 2024-06-13 PROCEDURE — 99024 POSTOP FOLLOW-UP VISIT: CPT | Performed by: PHYSICIAN ASSISTANT
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
## 2024-06-15 ENCOUNTER — APPOINTMENT (OUTPATIENT)
Dept: PRIMARY CARE | Facility: CLINIC | Age: 54
End: 2024-06-15
Payer: COMMERCIAL
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
## 2024-06-24 ENCOUNTER — ANESTHESIA EVENT (OUTPATIENT)
Dept: OPERATING ROOM | Facility: HOSPITAL | Age: 54
End: 2024-06-24
Payer: COMMERCIAL

## 2024-06-24 ENCOUNTER — APPOINTMENT (OUTPATIENT)
Dept: PRIMARY CARE | Facility: CLINIC | Age: 54
End: 2024-06-24
Payer: COMMERCIAL

## 2024-06-24 VITALS
OXYGEN SATURATION: 100 % | HEART RATE: 62 BPM | RESPIRATION RATE: 18 BRPM | BODY MASS INDEX: 29.19 KG/M2 | WEIGHT: 186 LBS | HEIGHT: 67 IN | TEMPERATURE: 97.7 F | SYSTOLIC BLOOD PRESSURE: 124 MMHG | DIASTOLIC BLOOD PRESSURE: 84 MMHG

## 2024-06-24 DIAGNOSIS — M17.0 PRIMARY OSTEOARTHRITIS OF BOTH KNEES: Primary | ICD-10-CM

## 2024-06-24 PROCEDURE — 99212 OFFICE O/P EST SF 10 MIN: CPT | Performed by: PHYSICIAN ASSISTANT

## 2024-06-24 PROCEDURE — 3008F BODY MASS INDEX DOCD: CPT | Performed by: PHYSICIAN ASSISTANT

## 2024-06-24 PROCEDURE — 1036F TOBACCO NON-USER: CPT | Performed by: PHYSICIAN ASSISTANT

## 2024-06-24 SDOH — HEALTH STABILITY: MENTAL HEALTH: CURRENT SMOKER: 0

## 2024-06-24 NOTE — PROGRESS NOTES
Chief Complaint   Patient presents with    Pre-op Exam     Dr Eisenberg pt here today for a pre op exam for Bilateral Total Knee Arthroplasty on 6/25/24 by Dr Nur. PAT was 6/11/24 and everything was good.         Chyna  is a 53 y.o. with b/l knee osteoarthritis  presenting at the request of Dr. Nur for preop clearance for b/l knee replacements      PT'S MEDICAL HISTORY:  - Denies current infection  - Denies h/o lung disease - asthma, COPD   - Denies h/o anemia   - Denies known CAD, h/o MI, HTN   - Denies sleep apnea   - Denies thyroid disease   - Denies bleeding disorder or blood clotting disorder easy burising, easy brusing   - Denies h/o prolonged steroid use      PAST SURGICAL HISTORY:  Past Surgical History:   Procedure Laterality Date    BREAST SURGERY Bilateral     augmentation    COLONOSCOPY      ENDOMETRIAL ABLATION      TUBAL LIGATION      WISDOM TOOTH EXTRACTION        - Pt denies complications to these surgeries   - Pt denies issues with the anesthesia        MEDICATIONS:    Current Outpatient Medications:     acetaminophen (Tylenol) 500 mg tablet, Take 1 tablet (500 mg) by mouth every 6 hours if needed for mild pain (1 - 3)., Disp: , Rfl:     acyclovir (Zovirax) 5 % ointment, APPLY  OINTMENT TO AFFECTED AREA 4 TIMES DAILY AS NEEDED, Disp: 20 g, Rfl: 0    ALPRAZolam (Xanax) 0.5 mg tablet, P.o. 1 or 2 tablets 30 minutes before flying, Disp: 8 tablet, Rfl: 0    chlorhexidine (Peridex) 0.12 % solution, Use 15 mL in the mouth or throat if needed for wound care for up to 2 doses. 15 mls swish and spit the night before surgery and 15mls swish and spit the morning of surgery do not swallow, Disp: 30 mL, Rfl: 0    cholecalciferol (Vitamin D3) 25 MCG (1000 UT) tablet, Take 1 tablet (25 mcg) by mouth once daily., Disp: , Rfl:     conj estrog-medroxyprogest ace (Prempro) 0.625-2.5 mg tablet, Take 1 tablet by mouth once daily., Disp: 90 tablet, Rfl: 2    cyanocobalamin, vitamin B-12, 1,000 mcg/mL kit, Inject  1,000 mcg as directed 1 (one) time per week. WEEKLY X 3 DOSES THEN MONTHLY THEREAFTER, Disp: 1 kit, Rfl: 11    diclofenac (Voltaren) 50 mg EC tablet, Take 1 tablet (50 mg) by mouth 3 times a day as needed (pain). Do not crush, chew, or split., Disp: 90 tablet, Rfl: 0    hydroCHLOROthiazide (HYDRODiuril) 25 mg tablet, Take 1 tablet (25 mg) by mouth once daily. (Patient taking differently: Take 1 tablet (25 mg) by mouth once daily as needed.), Disp: 90 tablet, Rfl: 3    linaCLOtide (Linzess) 145 mcg capsule, Take 1 capsule (145 mcg) by mouth once daily. (Patient taking differently: Take 1 capsule (145 mcg) by mouth once daily as needed.), Disp: 90 capsule, Rfl: 3    magnesium oxide (Mag-Ox) 400 mg (241.3 mg magnesium) tablet, Take 1 tablet (400 mg) by mouth once daily., Disp: 30 tablet, Rfl: 11    meloxicam (Mobic) 15 mg tablet, Take 1 tablet (15 mg) by mouth once daily as needed for mild pain (1 - 3)., Disp: 90 tablet, Rfl: 3    pantoprazole (ProtoNix) 20 mg EC tablet, Take 1 tablet (20 mg) by mouth once daily. (Patient taking differently: Take 1 tablet (20 mg) by mouth once daily as needed.), Disp: 90 tablet, Rfl: 3    traZODone (Desyrel) 100 mg tablet, Take 1 tablet (100 mg) by mouth once daily at bedtime., Disp: 90 tablet, Rfl: 3    Current Facility-Administered Medications:     cyanocobalamin (Vitamin B-12) injection 1,000 mcg, 1,000 mcg, intramuscular, Once, Cody Eisenberg, DO   - Will need to hold NSAIDs 1 week prior to surgery (meloxicam, Naproxen)    ALLERGIES:  No Known Allergies      SOCIAL HISTORY:  Tobacco - no   Alcohol - glass of wine, very occasional   Other substance use - no       FAMILY HISTORY:     Family History   Problem Relation Name Age of Onset    Diabetes Mother      Other (CARDIAC DISORDER) Father      Diabetes Sister      Diabetes Brother      ADD / ADHD Daughter        - No stroke, bleeding or clotting disorders     FUNCTIONAL CAPACITY:   - using Duke Activity Status Index score: 8.33  METs    Are you able to:  Take care of self (eating, dressing, bathing, using the toilet): yes   Walk indoors: yes   Walk 1-2 blocks on level ground: no   Climb a flight of stairs or walk up a hill: no   Run a short distance: no   Do light work around the house (dusting, washing dishes): yes   Do moderate work around the house (vacuuming, sweeping floors, carrying in groceries): yes   Do heavy work around the house (scrubbing floors, lifting or moving heavy furniture): yes   Do yardwork (raking leaves, weeding, pushing a power mower): yes   Have sexual relations: yes   Participate in moderate recreational activities (golf, bowling, dancing, doubles tennis, throwing a baseball or football): yes   Participate in strenuous sports (swimming, singles tennis, football, basketball, skiing): no     REVISED CARDIAC RISK INDEX  - Elevated-risk surgery (Intraperitoneal; intrathoracic; suprainguinal vascular): no   - History of ischemic heart disease (History of myocardial infarction (MI); history of positive exercise test; current chest pain considered due to myocardial ischemia; use of nitrate therapy or ECG with pathological Q waves): no  - History of congestive heart failure (Pulmonary edema, bilateral rales or S3 gallop; paroxysmal nocturnal dyspnea; chest x-ray (CXR) showing pulmonary vascular redistribution): no  History of cerebrovascular disease (Prior transient ischemic attack (TIA) or stroke): no  - Pre-operative treatment with insulin: no  - Pre-operative creatinine >2 mg/dL / 176.8 µmol/L: no  Total Score is: 0    STOP-BANG SCORE  Do you snore loudly? (Louder than talking or loud enough to be heard through closed doors): no   Do you often feel tired, fatigued, or sleepy during the daytime?: no   Has anyone observed you stop breathing during sleep?: no   Do you have (or are you being treated for) high blood pressure?: no  Objective measures:  BMI > 35kg/m2: no  Age > 50 years: yes   Neck circumference > 40: no  Male  gender: no  Total Scroe: 1 which correlates with low risk of moderate to severe FOSTER     Physical Exam  Constitutional:       General: She is not in acute distress.     Appearance: Normal appearance. She is not ill-appearing.   HENT:      Head: Normocephalic.      Right Ear: Tympanic membrane and ear canal normal.      Left Ear: Tympanic membrane and ear canal normal.      Nose: Nose normal.      Mouth/Throat:      Mouth: Mucous membranes are moist.      Pharynx: Oropharynx is clear.   Eyes:      General: No scleral icterus.        Right eye: No discharge.         Left eye: No discharge.      Extraocular Movements: Extraocular movements intact.      Conjunctiva/sclera: Conjunctivae normal.      Pupils: Pupils are equal, round, and reactive to light.   Neck:      Vascular: No carotid bruit.   Cardiovascular:      Rate and Rhythm: Normal rate and regular rhythm.      Pulses: Normal pulses.      Heart sounds: No murmur heard.  Pulmonary:      Effort: Pulmonary effort is normal.      Breath sounds: Normal breath sounds. No wheezing, rhonchi or rales.   Abdominal:      General: Bowel sounds are normal. There is no distension.      Palpations: Abdomen is soft. There is no mass.      Tenderness: There is no abdominal tenderness. There is no guarding.   Musculoskeletal:      Cervical back: Neck supple. No rigidity or tenderness.      Right lower leg: No edema.      Left lower leg: No edema.   Lymphadenopathy:      Cervical: No cervical adenopathy.   Skin:     General: Skin is warm and dry.      Findings: No lesion or rash.   Neurological:      General: No focal deficit present.      Mental Status: She is alert.      Gait: Gait normal.   Psychiatric:         Mood and Affect: Mood normal.         Behavior: Behavior normal.         Thought Content: Thought content normal.         Judgment: Judgment normal.          ASSESSMENT AND PLAN:  Chyna Gastelum is medically optimized/ CLEAR for the planned surgery, see below  for details.     1.  CARDIAC EVALUATION   - Revised cardiac risk index sore is 0 with 3.9% thirty day risk of death, MI or cardiac arrest post-op  - She has not h/o heart disease or HTN     2.  PULMONARY EVALUATION  - No asthma or COPD   - she  does not have dx of FOSTER. her  STOP BANG score is 1 - low risk of moderate to severe FOSTER     3.  HEMATOLOGIC EVALUATION  - No history of anemia   - Bleeding risk is low - no h/o bleeding disorder - denies easy bruising, frequent nosebleeds.   - No h/o blood clot or clotting disorder   - Pt was encouraged to stop NSAIDs at least 1 week prior to surgery     4.  ENDOCRINE EVALUATION  - No h/o diabetes  - No recent prolonged steroid use or other know risk factor for adrenal insufficiency.     5.  RENAL EVALUATION  - No CKD  - No risks identified     6. GI EVALUATION  - No history of liver disease   - No history of alcohol abuse   - No risks identified

## 2024-06-24 NOTE — ANESTHESIA PREPROCEDURE EVALUATION
Patient: Chyna Lorenzo    Procedure Information       Date/Time: 06/25/24 0730    Procedure: BILATERAL TOTAL KNEE ARTHROPLASTY (Bilateral: Knee) - DEPUY, 23 HR OBS/BEDDED OP    Location: [facility] OR  / [facility]    Surgeons: Garrett Nur MD            Relevant Problems   Neuro   (+) Anxiety   (+) Depression      GI   (+) GERD (gastroesophageal reflux disease)      Musculoskeletal   (+) DJD (degenerative joint disease) of knee      ID   (+) Herpes simplex       Clinical information reviewed:      Problems              NPO Detail:  No data recorded     Physical Exam    Airway  Mallampati: II  TM distance: >3 FB  Neck ROM: full     Cardiovascular    Dental - normal exam     Pulmonary    Abdominal        Anesthesia Plan    History of general anesthesia?: yes  History of complications of general anesthesia?: no    ASA 2     spinal and other   (IPACK and saphenous nerve blocks)  The patient is not a current smoker.    intravenous induction   Anesthetic plan and risks discussed with patient.    Plan discussed with CRNA.

## 2024-06-25 ENCOUNTER — HOSPITAL ENCOUNTER (OUTPATIENT)
Facility: HOSPITAL | Age: 54
Discharge: NURSING, REHAB OR LONG-TERM CARE | End: 2024-06-27
Attending: ORTHOPAEDIC SURGERY | Admitting: ORTHOPAEDIC SURGERY
Payer: COMMERCIAL

## 2024-06-25 ENCOUNTER — ANESTHESIA (OUTPATIENT)
Dept: OPERATING ROOM | Facility: HOSPITAL | Age: 54
End: 2024-06-25
Payer: COMMERCIAL

## 2024-06-25 ENCOUNTER — APPOINTMENT (OUTPATIENT)
Dept: RADIOLOGY | Facility: HOSPITAL | Age: 54
End: 2024-06-25
Payer: COMMERCIAL

## 2024-06-25 DIAGNOSIS — Z96.653 HISTORY OF KNEE REPLACEMENT, TOTAL, BILATERAL: ICD-10-CM

## 2024-06-25 DIAGNOSIS — K21.9 GASTROESOPHAGEAL REFLUX DISEASE, UNSPECIFIED WHETHER ESOPHAGITIS PRESENT: ICD-10-CM

## 2024-06-25 DIAGNOSIS — M17.0 PRIMARY OSTEOARTHRITIS OF BOTH KNEES: Primary | ICD-10-CM

## 2024-06-25 LAB — PREGNANCY TEST URINE, POC: NEGATIVE

## 2024-06-25 PROCEDURE — 73560 X-RAY EXAM OF KNEE 1 OR 2: CPT | Mod: BILATERAL PROCEDURE | Performed by: RADIOLOGY

## 2024-06-25 PROCEDURE — 7100000011 HC EXTENDED STAY RECOVERY HOURLY - NURSING UNIT

## 2024-06-25 PROCEDURE — 2500000002 HC RX 250 W HCPCS SELF ADMINISTERED DRUGS (ALT 637 FOR MEDICARE OP, ALT 636 FOR OP/ED): Performed by: ORTHOPAEDIC SURGERY

## 2024-06-25 PROCEDURE — 97161 PT EVAL LOW COMPLEX 20 MIN: CPT | Mod: GP | Performed by: PHYSICAL THERAPIST

## 2024-06-25 PROCEDURE — 2500000001 HC RX 250 WO HCPCS SELF ADMINISTERED DRUGS (ALT 637 FOR MEDICARE OP): Performed by: ORTHOPAEDIC SURGERY

## 2024-06-25 PROCEDURE — C1713 ANCHOR/SCREW BN/BN,TIS/BN: HCPCS | Performed by: ORTHOPAEDIC SURGERY

## 2024-06-25 PROCEDURE — 7100000002 HC RECOVERY ROOM TIME - EACH INCREMENTAL 1 MINUTE: Performed by: ORTHOPAEDIC SURGERY

## 2024-06-25 PROCEDURE — 2500000004 HC RX 250 GENERAL PHARMACY W/ HCPCS (ALT 636 FOR OP/ED): Performed by: ANESTHESIOLOGY

## 2024-06-25 PROCEDURE — 2500000001 HC RX 250 WO HCPCS SELF ADMINISTERED DRUGS (ALT 637 FOR MEDICARE OP): Performed by: REGISTERED NURSE

## 2024-06-25 PROCEDURE — 2500000004 HC RX 250 GENERAL PHARMACY W/ HCPCS (ALT 636 FOR OP/ED): Performed by: ORTHOPAEDIC SURGERY

## 2024-06-25 PROCEDURE — 2500000005 HC RX 250 GENERAL PHARMACY W/O HCPCS: Performed by: ORTHOPAEDIC SURGERY

## 2024-06-25 PROCEDURE — 27447 TOTAL KNEE ARTHROPLASTY: CPT | Performed by: ORTHOPAEDIC SURGERY

## 2024-06-25 PROCEDURE — 99221 1ST HOSP IP/OBS SF/LOW 40: CPT | Performed by: REGISTERED NURSE

## 2024-06-25 PROCEDURE — 97110 THERAPEUTIC EXERCISES: CPT | Mod: GP | Performed by: PHYSICAL THERAPIST

## 2024-06-25 PROCEDURE — 3700000001 HC GENERAL ANESTHESIA TIME - INITIAL BASE CHARGE: Performed by: ORTHOPAEDIC SURGERY

## 2024-06-25 PROCEDURE — 7100000001 HC RECOVERY ROOM TIME - INITIAL BASE CHARGE: Performed by: ORTHOPAEDIC SURGERY

## 2024-06-25 PROCEDURE — 3600000017 HC OR TIME - EACH INCREMENTAL 1 MINUTE - PROCEDURE LEVEL SIX: Performed by: ORTHOPAEDIC SURGERY

## 2024-06-25 PROCEDURE — 2500000004 HC RX 250 GENERAL PHARMACY W/ HCPCS (ALT 636 FOR OP/ED)

## 2024-06-25 PROCEDURE — 3700000002 HC GENERAL ANESTHESIA TIME - EACH INCREMENTAL 1 MINUTE: Performed by: ORTHOPAEDIC SURGERY

## 2024-06-25 PROCEDURE — 2780000003 HC OR 278 NO HCPCS: Performed by: ORTHOPAEDIC SURGERY

## 2024-06-25 PROCEDURE — 2500000004 HC RX 250 GENERAL PHARMACY W/ HCPCS (ALT 636 FOR OP/ED): Performed by: NURSE ANESTHETIST, CERTIFIED REGISTERED

## 2024-06-25 PROCEDURE — C1776 JOINT DEVICE (IMPLANTABLE): HCPCS | Performed by: ORTHOPAEDIC SURGERY

## 2024-06-25 PROCEDURE — 2720000007 HC OR 272 NO HCPCS: Performed by: ORTHOPAEDIC SURGERY

## 2024-06-25 PROCEDURE — 27447 TOTAL KNEE ARTHROPLASTY: CPT | Performed by: PHYSICIAN ASSISTANT

## 2024-06-25 PROCEDURE — 2500000004 HC RX 250 GENERAL PHARMACY W/ HCPCS (ALT 636 FOR OP/ED): Mod: JZ | Performed by: ORTHOPAEDIC SURGERY

## 2024-06-25 PROCEDURE — 73560 X-RAY EXAM OF KNEE 1 OR 2: CPT | Mod: 50

## 2024-06-25 PROCEDURE — 3600000018 HC OR TIME - INITIAL BASE CHARGE - PROCEDURE LEVEL SIX: Performed by: ORTHOPAEDIC SURGERY

## 2024-06-25 PROCEDURE — 81025 URINE PREGNANCY TEST: CPT | Performed by: ORTHOPAEDIC SURGERY

## 2024-06-25 DEVICE — ATTUNE KNEE SYSTEM FEMORAL POSTERIOR STABILIZED SIZE 4 RIGHT CEMENTED
Type: IMPLANTABLE DEVICE | Site: KNEE | Status: FUNCTIONAL
Brand: ATTUNE

## 2024-06-25 DEVICE — ATTUNE KNEE SYSTEM TIBIAL INSERT FIXED BEARING POSTERIOR STABILIZED 4 8MM AOX
Type: IMPLANTABLE DEVICE | Site: KNEE | Status: FUNCTIONAL
Brand: ATTUNE

## 2024-06-25 DEVICE — ATTUNE KNEE SYSTEM FEMORAL POSTERIOR STABILIZED SIZE 4 LEFT CEMENTED
Type: IMPLANTABLE DEVICE | Site: KNEE | Status: FUNCTIONAL
Brand: ATTUNE

## 2024-06-25 DEVICE — TIBAL BASE ATTUNE FB, SZ 4 CEM: Type: IMPLANTABLE DEVICE | Site: KNEE | Status: FUNCTIONAL

## 2024-06-25 DEVICE — IMPLANTABLE DEVICE
Type: IMPLANTABLE DEVICE | Site: KNEE | Status: FUNCTIONAL
Brand: BIOMET® BONE CEMENT R

## 2024-06-25 DEVICE — ATTUNE KNEE SYSTEM TIBIAL INSERT FIXED BEARING POSTERIOR STABILIZED 4 10MM AOX
Type: IMPLANTABLE DEVICE | Site: KNEE | Status: FUNCTIONAL
Brand: ATTUNE

## 2024-06-25 DEVICE — ATTUNE PATELLA MEDIALIZED DOME 32MM CEMENTED AOX
Type: IMPLANTABLE DEVICE | Site: KNEE | Status: FUNCTIONAL
Brand: ATTUNE

## 2024-06-25 RX ORDER — OXYCODONE HYDROCHLORIDE 5 MG/1
10 TABLET ORAL EVERY 4 HOURS PRN
Status: DISCONTINUED | OUTPATIENT
Start: 2024-06-25 | End: 2024-06-27 | Stop reason: HOSPADM

## 2024-06-25 RX ORDER — TRANEXAMIC ACID 650 MG/1
1950 TABLET ORAL ONCE
Status: COMPLETED | OUTPATIENT
Start: 2024-06-25 | End: 2024-06-25

## 2024-06-25 RX ORDER — CYCLOBENZAPRINE HCL 10 MG
10 TABLET ORAL 3 TIMES DAILY PRN
Status: DISCONTINUED | OUTPATIENT
Start: 2024-06-25 | End: 2024-06-27 | Stop reason: HOSPADM

## 2024-06-25 RX ORDER — ONDANSETRON 4 MG/1
4 TABLET, ORALLY DISINTEGRATING ORAL EVERY 8 HOURS PRN
Status: DISCONTINUED | OUTPATIENT
Start: 2024-06-25 | End: 2024-06-27 | Stop reason: HOSPADM

## 2024-06-25 RX ORDER — SODIUM CHLORIDE, SODIUM LACTATE, POTASSIUM CHLORIDE, CALCIUM CHLORIDE 600; 310; 30; 20 MG/100ML; MG/100ML; MG/100ML; MG/100ML
50 INJECTION, SOLUTION INTRAVENOUS CONTINUOUS
Status: ACTIVE | OUTPATIENT
Start: 2024-06-25 | End: 2024-06-26

## 2024-06-25 RX ORDER — PANTOPRAZOLE SODIUM 20 MG/1
20 TABLET, DELAYED RELEASE ORAL DAILY
Status: DISCONTINUED | OUTPATIENT
Start: 2024-06-25 | End: 2024-06-27 | Stop reason: HOSPADM

## 2024-06-25 RX ORDER — NALOXONE HYDROCHLORIDE 1 MG/ML
0.2 INJECTION INTRAMUSCULAR; INTRAVENOUS; SUBCUTANEOUS EVERY 5 MIN PRN
Status: DISCONTINUED | OUTPATIENT
Start: 2024-06-25 | End: 2024-06-27 | Stop reason: HOSPADM

## 2024-06-25 RX ORDER — ROPIVACAINE HYDROCHLORIDE 5 MG/ML
20 INJECTION, SOLUTION EPIDURAL; INFILTRATION; PERINEURAL ONCE
Status: COMPLETED | OUTPATIENT
Start: 2024-06-25 | End: 2024-06-25

## 2024-06-25 RX ORDER — TRAZODONE HYDROCHLORIDE 50 MG/1
50 TABLET ORAL NIGHTLY PRN
Status: DISCONTINUED | OUTPATIENT
Start: 2024-06-25 | End: 2024-06-27 | Stop reason: HOSPADM

## 2024-06-25 RX ORDER — OXYCODONE HYDROCHLORIDE 5 MG/1
5 TABLET ORAL EVERY 4 HOURS PRN
Status: DISCONTINUED | OUTPATIENT
Start: 2024-06-25 | End: 2024-06-27 | Stop reason: HOSPADM

## 2024-06-25 RX ORDER — SODIUM CHLORIDE, SODIUM LACTATE, POTASSIUM CHLORIDE, CALCIUM CHLORIDE 600; 310; 30; 20 MG/100ML; MG/100ML; MG/100ML; MG/100ML
100 INJECTION, SOLUTION INTRAVENOUS CONTINUOUS
Status: DISCONTINUED | OUTPATIENT
Start: 2024-06-25 | End: 2024-06-25 | Stop reason: HOSPADM

## 2024-06-25 RX ORDER — PROPOFOL 10 MG/ML
INJECTION, EMULSION INTRAVENOUS CONTINUOUS PRN
Status: DISCONTINUED | OUTPATIENT
Start: 2024-06-25 | End: 2024-06-25

## 2024-06-25 RX ORDER — TRANEXAMIC ACID 650 MG/1
1950 TABLET ORAL ONCE
Status: COMPLETED | OUTPATIENT
Start: 2024-06-26 | End: 2024-06-26

## 2024-06-25 RX ORDER — CEFAZOLIN SODIUM 2 G/100ML
2 INJECTION, SOLUTION INTRAVENOUS ONCE
Status: COMPLETED | OUTPATIENT
Start: 2024-06-25 | End: 2024-06-25

## 2024-06-25 RX ORDER — ROPIVACAINE HYDROCHLORIDE 5 MG/ML
20 INJECTION, SOLUTION EPIDURAL; INFILTRATION; PERINEURAL ONCE
Status: DISCONTINUED | OUTPATIENT
Start: 2024-06-25 | End: 2024-06-25

## 2024-06-25 RX ORDER — SODIUM CHLORIDE, SODIUM LACTATE, POTASSIUM CHLORIDE, CALCIUM CHLORIDE 600; 310; 30; 20 MG/100ML; MG/100ML; MG/100ML; MG/100ML
50 INJECTION, SOLUTION INTRAVENOUS CONTINUOUS
Status: DISCONTINUED | OUTPATIENT
Start: 2024-06-25 | End: 2024-06-27 | Stop reason: HOSPADM

## 2024-06-25 RX ORDER — WATER 1 ML/ML
IRRIGANT IRRIGATION AS NEEDED
Status: DISCONTINUED | OUTPATIENT
Start: 2024-06-25 | End: 2024-06-25 | Stop reason: HOSPADM

## 2024-06-25 RX ORDER — GLYCOPYRROLATE 0.2 MG/ML
INJECTION INTRAMUSCULAR; INTRAVENOUS AS NEEDED
Status: DISCONTINUED | OUTPATIENT
Start: 2024-06-25 | End: 2024-06-25

## 2024-06-25 RX ORDER — ACETAMINOPHEN 325 MG/1
975 TABLET ORAL ONCE
Status: COMPLETED | OUTPATIENT
Start: 2024-06-25 | End: 2024-06-25

## 2024-06-25 RX ORDER — GABAPENTIN 300 MG/1
300 CAPSULE ORAL ONCE
Status: COMPLETED | OUTPATIENT
Start: 2024-06-25 | End: 2024-06-25

## 2024-06-25 RX ORDER — BISACODYL 5 MG
10 TABLET, DELAYED RELEASE (ENTERIC COATED) ORAL DAILY PRN
Status: DISCONTINUED | OUTPATIENT
Start: 2024-06-25 | End: 2024-06-27 | Stop reason: HOSPADM

## 2024-06-25 RX ORDER — FENTANYL CITRATE 50 UG/ML
50 INJECTION, SOLUTION INTRAMUSCULAR; INTRAVENOUS EVERY 5 MIN PRN
Status: DISCONTINUED | OUTPATIENT
Start: 2024-06-25 | End: 2024-06-25 | Stop reason: HOSPADM

## 2024-06-25 RX ORDER — PROPOFOL 10 MG/ML
INJECTION, EMULSION INTRAVENOUS AS NEEDED
Status: DISCONTINUED | OUTPATIENT
Start: 2024-06-25 | End: 2024-06-25

## 2024-06-25 RX ORDER — ONDANSETRON HYDROCHLORIDE 2 MG/ML
4 INJECTION, SOLUTION INTRAVENOUS ONCE AS NEEDED
Status: DISCONTINUED | OUTPATIENT
Start: 2024-06-25 | End: 2024-06-25 | Stop reason: HOSPADM

## 2024-06-25 RX ORDER — CELECOXIB 200 MG/1
200 CAPSULE ORAL ONCE
Status: COMPLETED | OUTPATIENT
Start: 2024-06-25 | End: 2024-06-25

## 2024-06-25 RX ORDER — MORPHINE SULFATE 2 MG/ML
2 INJECTION, SOLUTION INTRAMUSCULAR; INTRAVENOUS EVERY 2 HOUR PRN
Status: DISCONTINUED | OUTPATIENT
Start: 2024-06-25 | End: 2024-06-27 | Stop reason: HOSPADM

## 2024-06-25 RX ORDER — BUPIVACAINE HYDROCHLORIDE 7.5 MG/ML
INJECTION, SOLUTION INTRASPINAL AS NEEDED
Status: DISCONTINUED | OUTPATIENT
Start: 2024-06-25 | End: 2024-06-25

## 2024-06-25 RX ORDER — CEFAZOLIN SODIUM 2 G/100ML
2 INJECTION, SOLUTION INTRAVENOUS EVERY 8 HOURS
Status: COMPLETED | OUTPATIENT
Start: 2024-06-25 | End: 2024-06-26

## 2024-06-25 RX ORDER — ROPIVACAINE/EPI/CLONIDINE/KET 2.46-0.005
SYRINGE (ML) INJECTION AS NEEDED
Status: DISCONTINUED | OUTPATIENT
Start: 2024-06-25 | End: 2024-06-25 | Stop reason: HOSPADM

## 2024-06-25 RX ORDER — DIPHENHYDRAMINE HCL 12.5MG/5ML
12.5 LIQUID (ML) ORAL EVERY 6 HOURS PRN
Status: DISCONTINUED | OUTPATIENT
Start: 2024-06-25 | End: 2024-06-27 | Stop reason: HOSPADM

## 2024-06-25 RX ORDER — ONDANSETRON HYDROCHLORIDE 2 MG/ML
4 INJECTION, SOLUTION INTRAVENOUS EVERY 8 HOURS PRN
Status: DISCONTINUED | OUTPATIENT
Start: 2024-06-25 | End: 2024-06-27 | Stop reason: HOSPADM

## 2024-06-25 RX ORDER — ONDANSETRON HYDROCHLORIDE 2 MG/ML
INJECTION, SOLUTION INTRAVENOUS AS NEEDED
Status: DISCONTINUED | OUTPATIENT
Start: 2024-06-25 | End: 2024-06-25

## 2024-06-25 RX ORDER — LANOLIN ALCOHOL/MO/W.PET/CERES
400 CREAM (GRAM) TOPICAL DAILY
Status: DISCONTINUED | OUTPATIENT
Start: 2024-06-25 | End: 2024-06-27 | Stop reason: HOSPADM

## 2024-06-25 RX ORDER — KETOROLAC TROMETHAMINE 30 MG/ML
30 INJECTION, SOLUTION INTRAMUSCULAR; INTRAVENOUS EVERY 6 HOURS
Status: COMPLETED | OUTPATIENT
Start: 2024-06-25 | End: 2024-06-26

## 2024-06-25 RX ORDER — MIDAZOLAM HYDROCHLORIDE 1 MG/ML
INJECTION, SOLUTION INTRAMUSCULAR; INTRAVENOUS AS NEEDED
Status: DISCONTINUED | OUTPATIENT
Start: 2024-06-25 | End: 2024-06-25

## 2024-06-25 RX ORDER — DOCUSATE SODIUM 100 MG/1
100 CAPSULE, LIQUID FILLED ORAL 2 TIMES DAILY
Status: DISCONTINUED | OUTPATIENT
Start: 2024-06-25 | End: 2024-06-27 | Stop reason: HOSPADM

## 2024-06-25 RX ORDER — MEPERIDINE HYDROCHLORIDE 25 MG/ML
12.5 INJECTION INTRAMUSCULAR; INTRAVENOUS; SUBCUTANEOUS EVERY 10 MIN PRN
Status: DISCONTINUED | OUTPATIENT
Start: 2024-06-25 | End: 2024-06-25 | Stop reason: HOSPADM

## 2024-06-25 RX ORDER — ACETAMINOPHEN 325 MG/1
650 TABLET ORAL EVERY 6 HOURS SCHEDULED
Status: DISCONTINUED | OUTPATIENT
Start: 2024-06-25 | End: 2024-06-27 | Stop reason: HOSPADM

## 2024-06-25 RX ORDER — SODIUM CHLORIDE 0.9 G/100ML
IRRIGANT IRRIGATION AS NEEDED
Status: DISCONTINUED | OUTPATIENT
Start: 2024-06-25 | End: 2024-06-25 | Stop reason: HOSPADM

## 2024-06-25 RX ADMIN — ACETAMINOPHEN 650 MG: 325 TABLET ORAL at 12:18

## 2024-06-25 RX ADMIN — ROPIVACAINE HYDROCHLORIDE 100 MG: 5 INJECTION EPIDURAL; INFILTRATION; PERINEURAL at 07:11

## 2024-06-25 RX ADMIN — GABAPENTIN 300 MG: 300 CAPSULE ORAL at 06:15

## 2024-06-25 RX ADMIN — ROPIVACAINE HYDROCHLORIDE: 5 INJECTION EPIDURAL; INFILTRATION; PERINEURAL at 07:11

## 2024-06-25 RX ADMIN — SODIUM CHLORIDE, POTASSIUM CHLORIDE, SODIUM LACTATE AND CALCIUM CHLORIDE 50 ML/HR: 600; 310; 30; 20 INJECTION, SOLUTION INTRAVENOUS at 11:41

## 2024-06-25 RX ADMIN — CEFAZOLIN SODIUM 2 G: 2 INJECTION, SOLUTION INTRAVENOUS at 16:34

## 2024-06-25 RX ADMIN — TRANEXAMIC ACID 1950 MG: 650 TABLET ORAL at 06:16

## 2024-06-25 RX ADMIN — TRAZODONE HYDROCHLORIDE 50 MG: 50 TABLET ORAL at 21:06

## 2024-06-25 RX ADMIN — HYDROMORPHONE HYDROCHLORIDE 0.5 MG: 1 INJECTION, SOLUTION INTRAMUSCULAR; INTRAVENOUS; SUBCUTANEOUS at 10:15

## 2024-06-25 RX ADMIN — TRANEXAMIC ACID 1950 MG: 650 TABLET ORAL at 16:36

## 2024-06-25 RX ADMIN — KETOROLAC TROMETHAMINE 30 MG: 30 INJECTION, SOLUTION INTRAMUSCULAR at 18:20

## 2024-06-25 RX ADMIN — DOCUSATE SODIUM 100 MG: 100 CAPSULE, LIQUID FILLED ORAL at 21:05

## 2024-06-25 RX ADMIN — SODIUM CHLORIDE, POTASSIUM CHLORIDE, SODIUM LACTATE AND CALCIUM CHLORIDE 50 ML/HR: 600; 310; 30; 20 INJECTION, SOLUTION INTRAVENOUS at 06:25

## 2024-06-25 RX ADMIN — OXYCODONE HYDROCHLORIDE 10 MG: 5 TABLET ORAL at 12:18

## 2024-06-25 RX ADMIN — POVIDONE-IODINE 1 APPLICATION: 5 SOLUTION TOPICAL at 06:11

## 2024-06-25 RX ADMIN — HYDROMORPHONE HYDROCHLORIDE 0.5 MG: 1 INJECTION, SOLUTION INTRAMUSCULAR; INTRAVENOUS; SUBCUTANEOUS at 10:30

## 2024-06-25 RX ADMIN — CELECOXIB 200 MG: 200 CAPSULE ORAL at 06:15

## 2024-06-25 RX ADMIN — ONDANSETRON 4 MG: 2 INJECTION INTRAMUSCULAR; INTRAVENOUS at 15:10

## 2024-06-25 RX ADMIN — ACETAMINOPHEN 650 MG: 325 TABLET ORAL at 18:19

## 2024-06-25 RX ADMIN — CYCLOBENZAPRINE HYDROCHLORIDE 10 MG: 10 TABLET, FILM COATED ORAL at 21:06

## 2024-06-25 RX ADMIN — ACETAMINOPHEN 975 MG: 325 TABLET ORAL at 06:15

## 2024-06-25 RX ADMIN — KETOROLAC TROMETHAMINE 30 MG: 30 INJECTION, SOLUTION INTRAMUSCULAR at 12:19

## 2024-06-25 RX ADMIN — DOCUSATE SODIUM 100 MG: 100 CAPSULE, LIQUID FILLED ORAL at 12:18

## 2024-06-25 SDOH — SOCIAL STABILITY: SOCIAL INSECURITY: DO YOU FEEL UNSAFE GOING BACK TO THE PLACE WHERE YOU ARE LIVING?: NO

## 2024-06-25 SDOH — SOCIAL STABILITY: SOCIAL INSECURITY: HAS ANYONE EVER THREATENED TO HURT YOUR FAMILY OR YOUR PETS?: NO

## 2024-06-25 SDOH — SOCIAL STABILITY: SOCIAL INSECURITY: WERE YOU ABLE TO COMPLETE ALL THE BEHAVIORAL HEALTH SCREENINGS?: YES

## 2024-06-25 SDOH — SOCIAL STABILITY: SOCIAL INSECURITY: DO YOU FEEL ANYONE HAS EXPLOITED OR TAKEN ADVANTAGE OF YOU FINANCIALLY OR OF YOUR PERSONAL PROPERTY?: NO

## 2024-06-25 SDOH — SOCIAL STABILITY: SOCIAL INSECURITY: HAVE YOU HAD ANY THOUGHTS OF HARMING ANYONE ELSE?: NO

## 2024-06-25 SDOH — SOCIAL STABILITY: SOCIAL INSECURITY: HAVE YOU HAD THOUGHTS OF HARMING ANYONE ELSE?: NO

## 2024-06-25 SDOH — SOCIAL STABILITY: SOCIAL INSECURITY: ARE THERE ANY APPARENT SIGNS OF INJURIES/BEHAVIORS THAT COULD BE RELATED TO ABUSE/NEGLECT?: NO

## 2024-06-25 SDOH — SOCIAL STABILITY: SOCIAL INSECURITY: DOES ANYONE TRY TO KEEP YOU FROM HAVING/CONTACTING OTHER FRIENDS OR DOING THINGS OUTSIDE YOUR HOME?: NO

## 2024-06-25 SDOH — SOCIAL STABILITY: SOCIAL INSECURITY: ARE YOU OR HAVE YOU BEEN THREATENED OR ABUSED PHYSICALLY, EMOTIONALLY, OR SEXUALLY BY ANYONE?: NO

## 2024-06-25 SDOH — SOCIAL STABILITY: SOCIAL INSECURITY: ABUSE: ADULT

## 2024-06-25 ASSESSMENT — LIFESTYLE VARIABLES
SKIP TO QUESTIONS 9-10: 1
AUDIT-C TOTAL SCORE: 2
HOW OFTEN DO YOU HAVE A DRINK CONTAINING ALCOHOL: 2-4 TIMES A MONTH
HOW OFTEN DO YOU HAVE 6 OR MORE DRINKS ON ONE OCCASION: NEVER
AUDIT-C TOTAL SCORE: 2
HOW MANY STANDARD DRINKS CONTAINING ALCOHOL DO YOU HAVE ON A TYPICAL DAY: 1 OR 2

## 2024-06-25 ASSESSMENT — COGNITIVE AND FUNCTIONAL STATUS - GENERAL
DRESSING REGULAR UPPER BODY CLOTHING: A LITTLE
CLIMB 3 TO 5 STEPS WITH RAILING: TOTAL
TURNING FROM BACK TO SIDE WHILE IN FLAT BAD: A LOT
DRESSING REGULAR UPPER BODY CLOTHING: A LITTLE
TOILETING: A LITTLE
HELP NEEDED FOR BATHING: A LITTLE
MOVING TO AND FROM BED TO CHAIR: TOTAL
STANDING UP FROM CHAIR USING ARMS: TOTAL
MOVING FROM LYING ON BACK TO SITTING ON SIDE OF FLAT BED WITH BEDRAILS: A LOT
MOBILITY SCORE: 10
WALKING IN HOSPITAL ROOM: TOTAL
STANDING UP FROM CHAIR USING ARMS: A LOT
TURNING FROM BACK TO SIDE WHILE IN FLAT BAD: A LOT
TURNING FROM BACK TO SIDE WHILE IN FLAT BAD: A LOT
HELP NEEDED FOR BATHING: A LITTLE
DRESSING REGULAR LOWER BODY CLOTHING: A LITTLE
CLIMB 3 TO 5 STEPS WITH RAILING: TOTAL
PATIENT BASELINE BEDBOUND: NO
DAILY ACTIVITIY SCORE: 20
TOILETING: A LITTLE
MOVING TO AND FROM BED TO CHAIR: A LOT
CLIMB 3 TO 5 STEPS WITH RAILING: TOTAL
WALKING IN HOSPITAL ROOM: TOTAL
MOVING TO AND FROM BED TO CHAIR: A LOT
MOVING FROM LYING ON BACK TO SITTING ON SIDE OF FLAT BED WITH BEDRAILS: A LOT
WALKING IN HOSPITAL ROOM: A LOT
MOVING FROM LYING ON BACK TO SITTING ON SIDE OF FLAT BED WITH BEDRAILS: A LOT
STANDING UP FROM CHAIR USING ARMS: A LOT
MOBILITY SCORE: 8
DRESSING REGULAR LOWER BODY CLOTHING: A LITTLE
DAILY ACTIVITIY SCORE: 20
MOBILITY SCORE: 11

## 2024-06-25 ASSESSMENT — PAIN DESCRIPTION - ORIENTATION
ORIENTATION: LEFT
ORIENTATION: RIGHT;LEFT
ORIENTATION: LEFT

## 2024-06-25 ASSESSMENT — PAIN - FUNCTIONAL ASSESSMENT
PAIN_FUNCTIONAL_ASSESSMENT: 0-10

## 2024-06-25 ASSESSMENT — PAIN DESCRIPTION - LOCATION
LOCATION: KNEE

## 2024-06-25 ASSESSMENT — COLUMBIA-SUICIDE SEVERITY RATING SCALE - C-SSRS
6. HAVE YOU EVER DONE ANYTHING, STARTED TO DO ANYTHING, OR PREPARED TO DO ANYTHING TO END YOUR LIFE?: NO
2. HAVE YOU ACTUALLY HAD ANY THOUGHTS OF KILLING YOURSELF?: NO
1. IN THE PAST MONTH, HAVE YOU WISHED YOU WERE DEAD OR WISHED YOU COULD GO TO SLEEP AND NOT WAKE UP?: NO

## 2024-06-25 ASSESSMENT — PAIN SCALES - GENERAL
PAINLEVEL_OUTOF10: 5 - MODERATE PAIN
PAINLEVEL_OUTOF10: 6
PAINLEVEL_OUTOF10: 5 - MODERATE PAIN
PAIN_LEVEL: 0
PAINLEVEL_OUTOF10: 4
PAINLEVEL_OUTOF10: 4
PAINLEVEL_OUTOF10: 7
PAINLEVEL_OUTOF10: 4
PAINLEVEL_OUTOF10: 7
PAINLEVEL_OUTOF10: 0 - NO PAIN

## 2024-06-25 ASSESSMENT — ACTIVITIES OF DAILY LIVING (ADL)
FEEDING YOURSELF: INDEPENDENT
HEARING - LEFT EAR: FUNCTIONAL
ADEQUATE_TO_COMPLETE_ADL: YES
BATHING: INDEPENDENT
LACK_OF_TRANSPORTATION: NO
GROOMING: INDEPENDENT
TOILETING: INDEPENDENT
JUDGMENT_ADEQUATE_SAFELY_COMPLETE_DAILY_ACTIVITIES: YES
WALKS IN HOME: INDEPENDENT
DRESSING YOURSELF: INDEPENDENT
HEARING - RIGHT EAR: FUNCTIONAL
PATIENT'S MEMORY ADEQUATE TO SAFELY COMPLETE DAILY ACTIVITIES?: YES
LACK_OF_TRANSPORTATION: NO
ASSISTIVE_DEVICE: EYEGLASSES

## 2024-06-25 ASSESSMENT — PATIENT HEALTH QUESTIONNAIRE - PHQ9
SUM OF ALL RESPONSES TO PHQ9 QUESTIONS 1 & 2: 0
1. LITTLE INTEREST OR PLEASURE IN DOING THINGS: NOT AT ALL
2. FEELING DOWN, DEPRESSED OR HOPELESS: NOT AT ALL

## 2024-06-25 ASSESSMENT — PAIN SCALES - WONG BAKER: WONGBAKER_NUMERICALRESPONSE: HURTS EVEN MORE

## 2024-06-25 ASSESSMENT — PAIN DESCRIPTION - DESCRIPTORS: DESCRIPTORS: ACHING

## 2024-06-25 NOTE — ANESTHESIA PROCEDURE NOTES
Spinal Block    Patient location during procedure: OR  Start time: 6/25/2024 7:32 AM  End time: 6/25/2024 7:39 AM  Reason for block: primary anesthetic and at surgeon's request  Staffing  Performed: CRNA   Authorized by: Miky Rey MD    Performed by: DIANA Wolf-CRNA    Preanesthetic Checklist  Completed: patient identified, IV checked, site marked, risks and benefits discussed, surgical consent, monitors and equipment checked, pre-op evaluation, timeout performed and sterile techniques followed  Block Timeout  RN/Licensed healthcare professional reads aloud to the Anesthesia provider and entire team: Patient identity, procedure with side and site, patient position, and as applicable the availability of implants/special equipment/special requirements.  Patient on coagulant treatment: no  Timeout performed at: 6/25/2024 7:32 AM  Spinal Block  Patient position: sitting  Prep: Betadine  Sterility prep: cap, drape, gloves, hand hygiene and mask  Sedation level: light sedation  Patient monitoring: blood pressure, continuous pulse oximetry, EKG, ETCO2 and heart rate  Approach: left paramedian  Vertebral space: L4-5  Injection technique: single-shot  Needle  Needle type: pencil-point   Needle gauge: 24 G  Free flowing CSF: yes    Assessment  Sensory level: T6 bilateral  Block outcome: patient comfortable  Procedure assessment: patient tolerated procedure well with no immediate complications  Additional Notes  1% lidocaine skin wheal to level, 20 g introducer with pencan

## 2024-06-25 NOTE — PROGRESS NOTES
06/25/24 1458   Discharge Planning   Living Arrangements Spouse/significant other   Support Systems Spouse/significant other   Assistance Needed none, Pt states PTA completely independent ADLS and IADLS no AD, works, drives, denies falls. Owns FWW, raised toilet, tub and walk-in shower with seat and grab bar, has access to cane and wheelchair if needed   Type of Residence Private residence  (1 level + basement, everything including laundry on main level)   Number of Stairs to Enter Residence 2   Number of Stairs Within Residence 0   Do you have animals or pets at home? Yes   Type of Animals or Pets 3 Dogs (small)   Home or Post Acute Services Post acute facilities (Rehab/SNF/etc)   Type of Post Acute Facility Services Rehab;Skilled nursing   Patient expects to be discharged to: [facility] acute rehab   Does the patient need discharge transport arranged? Yes   RoundTrip coordination needed? Yes   What day is the transport expected? 06/26/24   Financial Resource Strain   How hard is it for you to pay for the very basics like food, housing, medical care, and heating? Not hard   Housing Stability   In the last 12 months, was there a time when you were not able to pay the mortgage or rent on time? N   In the last 12 months, how many places have you lived? 1   In the last 12 months, was there a time when you did not have a steady place to sleep or slept in a shelter (including now)? N   Transportation Needs   In the past 12 months, has lack of transportation kept you from medical appointments or from getting medications? no   In the past 12 months, has lack of transportation kept you from meetings, work, or from getting things needed for daily living? No   Patient Choice   Provider Choice list and CMS website (https://medicare.gov/care-compare#search) for post-acute Quality and Resource Measure Data were provided and reviewed with: Patient;Family   Patient / Family choosing to utilize agency / facility established prior  to hospitalization No     Pt is s/p Elective Bilateral knee replacement surgery 6/25/24 with Dr. Garrett Nur. PT/OT eval [facility] scores are currently pending. Pt states discharge preference is Acute rehab and facility of choice is [facility] who confirms able to accept pending insurance authorization. Will await Therapy eval/notes and notify team to send for precert. CT team karmen monitor case for progression and DC planning.

## 2024-06-25 NOTE — ANESTHESIA POSTPROCEDURE EVALUATION
Patient: Chyna Lorenzo    Procedure Summary       Date: 06/25/24 Room / Location: [facility] OR 04 / [facility]    Anesthesia Start: 0732 Anesthesia Stop: 1016    Procedure: BILATERAL TOTAL KNEE ARTHROPLASTY (Bilateral: Knee) Diagnosis:       DJD (degenerative joint disease) of knee      (DJD (degenerative joint disease) of knee [M17.9])    Surgeons: Garrett Nur MD Responsible Provider: Miky Rey MD    Anesthesia Type: spinal, other ASA Status: 2            Anesthesia Type: spinal, other    Vitals Value Taken Time   /63 06/25/24 1016   Temp 36.3 06/25/24 1016   Pulse 87 06/25/24 1016   Resp 22 06/25/24 1016   SpO2 99 06/25/24 1016       Anesthesia Post Evaluation    Patient location during evaluation: bedside  Patient participation: complete - patient participated  Level of consciousness: awake and alert  Pain score: 0  Pain management: adequate  Airway patency: patent  Cardiovascular status: acceptable and stable  Respiratory status: acceptable and room air  Hydration status: acceptable  Postoperative Nausea and Vomiting: none      No notable events documented.

## 2024-06-25 NOTE — ANESTHESIA PROCEDURE NOTES
Peripheral Block    Patient location during procedure: holding area  Start time: 6/25/2024 7:10 AM  End time: 6/25/2024 7:20 AM  Reason for block: post-op pain management  Staffing  Performed: attending   Authorized by: Miky Rey MD    Performed by: Miky Rey MD  Preanesthetic Checklist  Completed: patient identified, IV checked, site marked, risks and benefits discussed, surgical consent, monitors and equipment checked, pre-op evaluation and timeout performed   Timeout performed at: 6/25/2024 7:10 AM  Peripheral Block  Patient position: laying flat  Prep: ChloraPrep and site prepped and draped  Patient monitoring: continuous pulse ox, heart rate and cardiac monitor  Block type: other  Laterality: B/L  Injection technique: single-shot  Guidance: ultrasound guided  Local infiltration: lidocaine  Infiltration strength: 1 %  Dose: 3 mL  Needle  Needle localization: anatomical landmarks and ultrasound guidance  Catheter type: open end  Assessment  Injection assessment: negative aspiration for heme, local visualized surrounding nerve on ultrasound, no paresthesia on injection, incremental injection and transient paresthesias  Paresthesia pain: none  Heart rate change: no  Slow fractionated injection: yes  Additional Notes  IPACK block performed.  Pt identified, time out performed, extremity marked.  Patient supine with transducer beneath knee.  Chloroprep.  1% lidocaine 3cc subcutaneous.  21g needle via lateral, IP approach.  15cc of 0.5% ropivacaine injected in 5cc aliquots after negative aspiration into space between popliteal artery and capsule of the right knee.  Procedure repeated on the right knee.  Images taken, no complications noted

## 2024-06-25 NOTE — CARE PLAN
Problem: Fall/Injury  Goal: Not fall by end of shift  Outcome: Progressing  Goal: Be free from injury by end of the shift  Outcome: Progressing  Goal: Verbalize understanding of personal risk factors for fall in the hospital  Outcome: Progressing  Goal: Verbalize understanding of risk factor reduction measures to prevent injury from fall in the home  Outcome: Progressing  Goal: Use assistive devices by end of the shift  Outcome: Progressing  Goal: Pace activities to prevent fatigue by end of the shift  Outcome: Progressing     Problem: Pain  Goal: Takes deep breaths with improved pain control throughout the shift  Outcome: Progressing  Goal: Turns in bed with improved pain control throughout the shift  Outcome: Progressing  Goal: Walks with improved pain control throughout the shift  Outcome: Progressing  Goal: Performs ADL's with improved pain control throughout shift  Outcome: Progressing  Goal: Participates in PT with improved pain control throughout the shift  Outcome: Progressing  Goal: Free from opioid side effects throughout the shift  Outcome: Progressing  Goal: Free from acute confusion related to pain meds throughout the shift  Outcome: Progressing

## 2024-06-25 NOTE — PROGRESS NOTES
Physical Therapy    Physical Therapy Evaluation & Treatment    Patient Name: Chyna Lorenzo  MRN: [id number]  Today's Date: 6/25/2024   Time Calculation  Start Time: 1549  Stop Time: 1633  Time Calculation (min): 44 min  611/611-A    Assessment/Plan   PT Assessment  PT Assessment Results: Decreased strength, Decreased range of motion, Impaired balance, Decreased mobility, Obesity, Pain, Impaired sensation  Rehab Prognosis: Good  Barriers to Discharge: Pt. has 2 steps to enter home.  End of Session Communication: Bedside nurse  Assessment Comment: Pt. is s/p B/L TKR POD#0 and is progressing as expected. Pt. will benefit from continued PT to increase mobility and indep.  End of Session Patient Position: Up in chair  IP OR SWING BED PT PLAN  Inpatient or Swing Bed: Inpatient  PT Plan  Treatment/Interventions: Bed mobility, Transfer training, Gait training, Stair training, Balance training, Strengthening, Range of motion, Therapeutic exercise, Therapeutic activity, Home exercise program  PT Plan: Ongoing PT  PT Frequency: BID  PT Discharge Recommendations: High intensity level of continued care  Equipment Recommended upon Discharge: Wheeled walker  PT Recommended Transfer Status: Assist x2, Assistive device  PT - OK to Discharge: Yes (to next level of care)      General Visit Information:  General  Reason for Referral: s/p B/L TKA  Referred By: JOSE MANUEL Nur  Past Medical History Relevant to Rehab: OA, B/L severe patellofemoral sydrome, breast Sx, obesity, ADHD  Family/Caregiver Present: Yes  Caregiver Feedback: spouse present  Prior to Session Communication: Bedside nurse  Patient Position Received: Bed, 3 rail up (Pt. presents with increased drowsiness)  General Comment: Admit for elective B/L TKA with Dr. OZZIE Nur 6/25    Home Living:  Home Living  Home Living Comments: Lives with spouse (works FT) in 1 story home with 2 CLEMENCIA with HR. Walk-in shower with GBs. Owns 2ww.    Prior Level of Function:  Prior Function Per  Pt/Caregiver Report  Prior Function Comments: Indep. amb. without AD PTA. 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 Denies any recent falls. +drives    Precautions:  Precautions  LE Weight Bearing Status: Weight Bearing as Tolerated (BLE)  Medical Precautions: Fall precautions  Post-Surgical Precautions:  (knee immobilizer to be worn until +SLR and/or no sign of buckling)  Precautions Comment: B/L knee immobilizers worn throughout session      Pain:  Pain Assessment  Pain Assessment: 0-10  0-10 (Numeric) Pain Score: 4  Pain Type: Surgical pain  Pain Location: Knee  Pain Orientation: Left, Right  Pain Interventions: Cold pack, Repositioned    Cognition:  Cognition  Overall Cognitive Status:  (drowsy)  Orientation Level: Oriented X4    General Assessments:        Sensation  Sensation Comment: Continued numbness/tingling present in BLEs (proximal > distal) due to recent nerve blocks  Strength  Strength Comments: BLE (-) SLR, B/L ankle DF/PF WFL                Functional Assessments:     Bed Mobility  Bed Mobility:  (supine to sit with modAx1 for assist with BLEs)  Transfers  Transfer:  (sit to/from stand with ww and modAx2; cues for technique)  Ambulation/Gait Training  Ambulation/Gait Training Performed:  (2-3 side steps and Bed to chair ~ ~3 ft with ww and modAx2; flexed at hips; slow, short steps with minimal foot clearance. Pt. presented with nausea and lightheadedness after transfer; RN present and vitals assessed. seated BP 98/59 HR 55 bpm); symptoms gradually improved after being fully reclined in recliner >5 min.           Extremity/Trunk Assessments:  RUE   RUE : Within Functional Limits  LUE   LUE: Within Functional Limits  RLE   RLE :  (R hip and ankle DF/PF WNL; R knee ~ 10-50 degrees (AAROM in supine; guarded knee flexion))  LLE   LLE :  (L hip and ankle DF/PF WNL; L knee ~ 10-45 degrees (AAROM in supine; guarded knee flexion))    Treatments:  HEP handout issued and ther-ex initiated: GS, ZACARIAS APs, QS and heel slides  "(AAROM) , as tolerated   Outcome Measures:  [facility] Basic Mobility  Turning from your back to your side while in a flat bed without using bedrails: A lot  Moving from lying on your back to sitting on the side of a flat bed without using bedrails: A lot  Moving to and from bed to chair (including a wheelchair): Total (2 person assist required, therefore, scored as a \"1\")  Standing up from a chair using your arms (e.g. wheelchair or bedside chair): Total (2 person assist required, therefore, scored as a \"1\")  To walk in hospital room: Total  Climbing 3-5 steps with railing: Total  Basic Mobility - Total Score: 8                            Goals:  Encounter Problems       Encounter Problems (Active)       Impaired mobility s/p B/L TKR        Perform all bed mobility with mod. indep.       Start:  06/25/24    Expected End:  07/09/24            Perform all transfers with ww and mod. indep.        Start:  06/25/24    Expected End:  07/09/24            Patient will ambulate >/= 75 ft with ww and mod. indep.       Start:  06/25/24    Expected End:  07/09/24            Patient will ascend/descend 2 steps with HR and SPC with minAx1       Start:  06/25/24    Expected End:  07/09/24            Patient will perform BLE HEP with indep. (Progressing)       Start:  06/25/24    Expected End:  07/09/24            Patient will achieve 0-90 degrees B/L knee ROM        Start:  06/25/24    Expected End:  07/09/24                 Education Documentation  Home Exercise Program, taught by Domenica Larson PT at 6/25/2024  5:01 PM.  Learner: Patient  Readiness: Acceptance  Method: Explanation, Demonstration, Handout  Response: Verbalizes Understanding, Demonstrated Understanding, Needs Reinforcement  Comment: see note    Education Comments  No comments found.       "

## 2024-06-25 NOTE — DISCHARGE INSTRUCTIONS
Total Knee Replacement  Discharge Instructions    To prevent Clot formation, you have been placed on the following medication:  Xarelto 10 mg once daily for 28 days started on 6/26/24.  Surgical Site Care:  .Change dressing once a day and PRN (as needed). Apply 4 x 4 sponge and light tape. If glue present, leave open to air.  You may leave wound open to air after initial dressing removal, if wound is clean, dry and intact  If Aquacel Ag dressing is present, do not remove dressing for 7 days, unless heavily saturated. If heavily saturated, remove dressing and start using instructions above  Staples will be removed on post-operative day 14 and steri-strips applied  Showering is permitted starting POD1 if waterproof Aquacel dressing is present or when the incision is covered with 4 x 4 and Tegaderm waterproof dressing  Until all areas of incision are healed.    Physical Therapy:  Weight Bearing Status:  WBAT  Precautions, Per Physical Therapy Handout  Pain Medications  You were given  oxycodone  Wean off pain medications as you deem appropriate as long as pain is under control  Cold packs/Ice packs/Machine  May be used 3 times daily for 15-30 minutes as necessary  Be sure to have a barrier (cloth, clothing, towel) between the site and the ice pack to prevent frostbite  Contact [facility] office if  Increased redness, swelling, drainage of any kind, and/or pain to surgery site.  As well as new onset fevers and or chills.  These could signify an infection.  Calf or thigh tenderness to touch as well as increased swelling or redness.  This could signify a clot formation.  Numbness or tingling to an area around the incision site or below the incision site (toes).  Any rash appears, increased  or new onset nausea/vomiting occur.  This may indicate a reaction to a medication.  Phone # [phone].  Follow up with Surgeon   I acknowledge that I have received devi hose and understand the instructions on how and  when to wear them (on during the day, off at night)   Discharging RN who has gone over instructions and acknowledges adwoa hose have been received   Ice 5 times a day for 20 minutes each session to operative extremity for two weeks.   ADWOA hose to be worn for three weeks. Can be removed for skin care and hygiene.   Incentive spirometer 10 times every hour while awake for two weeks.

## 2024-06-25 NOTE — OP NOTE
BILATERAL TOTAL KNEE ARTHROPLASTY (B) Operative Note  Preop diagnosis: Bilateral knee osteoarthritis    Postop diagnosis: Bilateral knee osteoarthritis      Assistant: Garrett Vizcaino physician assistant (PAC) was required and present throughout the entire case.  Given the nature of the disease process and the procedure, a skilled surgical first assistant was necessary during the entire case.  The assistant was necessary to hold retractors and manipulate extremity during the procedure.  A certified scrub tech was also present at the back table managing instruments with supplies for the surgical case    Date: 2024  OR Location: [facility]    Name: Chyna Lorenzo, : 1970, Age: 53 y.o., MRN: [id number], Sex: female    Diagnosis  Pre-op Diagnosis     * DJD (degenerative joint disease) of knee [M17.9] Post-op Diagnosis     * DJD (degenerative joint disease) of knee [M17.9]     Procedures  BILATERAL TOTAL KNEE ARTHROPLASTY  87953 - MS ARTHRP KNE CONDYLE&PLATU MEDIAL&LAT COMPARTMENTS      Surgeons      * Garrett Nur - Primary    Resident/Fellow/Other Assistant:  Surgeons and Role:     * Garrett Vizcaino PA-C - Assisting    Procedure Summary  Anesthesia: Spinal  ASA: II  Anesthesia Staff: Anesthesiologist: Miky Rey MD  CRNA: DIANA Wolf-CRNA  Estimated Blood Loss: Minimal   Intra-op Medications:   Administrations occurring from 0730 to 0900 on 24:   Medication Name Total Dose   sodium chloride 0.9 % irrigation solution 3,000 mL   ropivacaine-epinephrine-clonidine-ketorolac 2.46-0.005- 0.0008-0.3mg/mL periarticular syringe 100 mL   sterile water irrigation solution 1,000 mL   ceFAZolin in dextrose (iso-os) (Ancef) IVPB 2 g 2 g              Anesthesia Record               Intraprocedure I/O Totals          Intake    LR bolus 1000.00 mL    Propofol Drip 0.00 mL    The total shown is the total volume documented since Anesthesia Start was filed.    Total Intake 1000  mL          Specimen: No specimens collected     Staff:   Circulator: Ian  Scrub Person: Louisa         Drains and/or Catheters: * None in log *    Tourniquet Times:   * Missing tourniquet times found for documented tourniquets in lo *  Total Tourniquet Time Documented:  Thigh (Bilateral) - 78 minutes  Total: Thigh (Bilateral) - 78 minutes      Implants:  Implants       Type Name Action Serial No.      Joint Knee CEMENT, BONE, BIOMET R, 1X40 - [id number] Implanted      Joint Knee FEMORAL, ATTUNE PS, DIEGO, SZ 4, RT - [id number] Implanted      Joint Knee TIBAL BASE ATTUNE FB, SZ 4 DIEGO - [id number] Implanted      Joint Knee INSERT, ATTUNE PS FB, SZ 4, 8MM - [id number] Implanted      Joint Knee DOME, PATELLA, MEDIALIZED, 32MM - [id number] Implanted      Joint Knee CEMENT, BONE, BIOMET R, 1X40 - [id number] Implanted       SIZE 4, ATTUNE POSTERIOR STABILIZED FEMORAL, LEFT, CEMENTED Implanted      Joint Knee TIBAL BASE ATTUNE FB, SZ 4 DIEGO - [id number] Implanted      Joint Knee INSERT, ATTUNE PS FB, SZ 4, 10MM - [id number] Implanted               Findings: see procedure details    Indications: Chyna Lorenzo is an 53 y.o. female who is having surgery for DJD (degenerative joint disease) of knee [M17.9].     The patient was seen in the preoperative area. The risks, benefits, complications, treatment options, non-operative alternatives, expected recovery and outcomes were discussed with the patient. The possibilities of reaction to medication, pulmonary aspiration, injury to surrounding structures, bleeding, recurrent infection, the need for additional procedures, failure to diagnose a condition, and creating a complication requiring transfusion or operation were discussed with the patient. The patient concurred with the proposed plan, giving informed consent.  The site of surgery was properly noted/marked if necessary per policy. The patient has been actively warmed in preoperative area. Preoperative antibiotics  have been ordered and given within 1 hours of incision. Venous thrombosis prophylaxis have been ordered.    Procedure Details:   Right knee replacement    Operative Report:       Procedure right total knee replacement    Components size:    Femoral size DePuy attune cemented #4     Tibial size DePuy attune cemented #4    Patella size cemented 32 mm    Angelica size 8 mm PS    Alignment varus    Summation of surgical procedure    The patient was taken to the operating room and surgical timeout performed.  The patient received preoperative antibiotics.  After satisfactory anesthetic the appropriate knee was prepped and draped in the usual sterile fashion.  A medial prepatellar incision was made and dissection carried sharply through the skin and underlying fat to the fascial tissue.  A medial capsular incision was made and the joint entered.  There was a modest amount of synovial fluid.  Synovium was hypertrophic.    A drill hole was made in the center of the femur and the intramedullary guide inserted.  The cutting jig was inserted with 3° of external rotation and the distal femoral cut was made.  The jig the rods were removed and the appropriate size template inserted.  The anterior and posterior condyles were cut with an oscillating saw, and the chamfering cuts were made.  Final preparation of the femur was completed and this allowed for translating the tibia anteriorly.    A drill hole was made in the proximal tibia just in line with the old ACL footprint.  An intramedullary guide was used in the tibia and a cut was made 2 mm below the worn tibial surface after guidepins were placed and the tibial cutting guide was fitted on the anterior tibia with approximately 3° of slope posteriorly.    After the tibia was cut appropriate sizing of the flexion-extension gaps was measured Intra-Op, adjustments were made as needed to correct the gaps so that they were symmetric in both flexion and extension.  We then trialed the  appropriate size tibia on the proximal tibia and finish tibial preparation first with a punch after insertion of the central reamer.    Trial femur and tibial components were inserted and we selected and trialed the appropriate poly iner.  This was used to control stability which was assessed in both flexion extension and the varus valgus plane.    With the knee in full extension the patella preparation was completed we reamed the patella with the appropriate size reamer to leave a residual 14-15 mm of natural patella.  Final patella preparation included 3 drill holes and patellar tracking was assessed.    Trials were inserted, appropriate sizes were determined and ligament balance was performed.  A batch of cement was mixed after lavage and careful bone drying, procedures were inserted and held in full extension until the cement had hardened with a patella clamp in place.  The ligaments were checked and stable.  The wound was then closed in layers using #1 Vicryl to close the arthrotomy and a figure-of-eight manner, inverted interrupted 2-0 Vicryl the subcutaneous tissues, a running 3-0 Monocryl in the subcutaneous and skin layers compressive dressing was then applied and a bulky sterile fashion.  The patient tolerated the procedure well and returned to the post anesthesia recovery room in satisfactory condition.  Postop  operative x-ray was reviewed and the findings                  Left knee replacement  Operative Report:       Procedure left total knee replacement    Components size:    Femoral size DePuy attune cemented 4     Tibial size DePuy attune cemented  4    Patella size cemented 32 mm    Angelica size 10 mm PS    Alignment varus    Summation of surgical procedure    The patient was taken to the operating room and surgical timeout performed.  The patient received preoperative antibiotics.  After satisfactory anesthetic the appropriate knee was prepped and draped in the usual sterile fashion.  A medial  prepatellar incision was made and dissection carried sharply through the skin and underlying fat to the fascial tissue.  A medial capsular incision was made and the joint entered.  There was a modest amount of synovial fluid.  Synovium was hypertrophic.    A drill hole was made in the center of the femur and the intramedullary guide inserted.  The cutting jig was inserted with 3° of external rotation and the distal femoral cut was made.  The jig the rods were removed and the appropriate size template inserted.  The anterior and posterior condyles were cut with an oscillating saw, and the chamfering cuts were made.  Final preparation of the femur was completed and this allowed for translating the tibia anteriorly.    A drill hole was made in the proximal tibia just in line with the old ACL footprint.  An intramedullary guide was used in the tibia and a cut was made 2 mm below the worn tibial surface after guidepins were placed and the tibial cutting guide was fitted on the anterior tibia with approximately 3° of slope posteriorly.    After the tibia was cut appropriate sizing of the flexion-extension gaps was measured Intra-Op, adjustments were made as needed to correct the gaps so that they were symmetric in both flexion and extension.  We then trialed the appropriate size tibia on the proximal tibia and finish tibial preparation first with a punch after insertion of the central reamer.    Trial femur and tibial components were inserted and we selected and trialed the appropriate poly iner.  This was used to control stability which was assessed in both flexion extension and the varus valgus plane.    With the knee in full extension the patella preparation was completed we reamed the patella with the appropriate size reamer to leave a residual 14-15 mm of natural patella.  Final patella preparation included 3 drill holes and patellar tracking was assessed.    Trials were inserted, appropriate sizes were determined  and ligament balance was performed.  A batch of cement was mixed after lavage and careful bone drying, procedures were inserted and held in full extension until the cement had hardened with a patella clamp in place.  The ligaments were checked and stable.  The wound was then closed in layers using #1 Vicryl to close the arthrotomy and a figure-of-eight manner, inverted interrupted 2-0 Vicryl the subcutaneous tissues, a running 3-0 Monocryl in the subcutaneous and skin layers compressive dressing was then applied and a bulky sterile fashion.  The patient tolerated the procedure well and returned to the post anesthesia recovery room in satisfactory condition.  Postop  operative x-ray was reviewed and the findings    Complications:  None; patient tolerated the procedure well.    Disposition: PACU - hemodynamically stable.  Condition: stable         Garrett Nur  Phone Number: [phone]

## 2024-06-25 NOTE — CONSULTS
Consults    Reason For Consult  Anxiety / depression     History Of Present Illness  Chyna Lorenzo is a 53 y.o. female presents to the orthopedics team with increased pain and decreased ability to perform ADLS due to bilateral knee OA. After failed conservative treatment, patient underwent surgery with no immediate post op complications, reports minimal pain to site described as dull in nature, mild in severity, responding well to pain meds. No other complaints. Hospitalist services were consulted for management of the patient's medical conditions post/op.  Patient examined and seen, resting comfortably. Denies chest pain, shortness of breath, abdominal pain, dizziness, fever or chills. Currently patient vital signs are stable. Plan of care was discussed with patient, verbalized understanding through teach back method. Hospitalist team will continue to follow until discharge.    Review of systems: 10 system were reviewed and were negative except what was mentioned in history of present illness         Past Medical History  Anxiety , depression, gerd, OA, DJD   IBC constipation, insomnia     Surgical History  She has a past surgical history that includes Breast surgery (Bilateral); Tubal ligation; [place] tooth extraction; Colonoscopy; and Endometrial ablation.     Social History  She reports that she has never smoked. She has never used smokeless tobacco. She reports current alcohol use. She reports that she does not use drugs.    Family History  Family History   Problem Relation Name Age of Onset    Diabetes Mother      Other (CARDIAC DISORDER) Father      Diabetes Sister      Diabetes Brother      ADD / ADHD Daughter          Allergies  Patient has no known allergies.       Physical Exam  Constitutional: Well developed, awake/alert/oriented x3, cooperative  Eyes: PERRL,  clear sclera  ENMT: mucous membranes moist, no apparent injury, no lesions seen  Head/Neck: Neck supple, no apparent injury,  Respiratory/Thorax:  Patent airways,  normal breath sounds with good chest expansion, thorax symmetric  Cardiovascular: Regular, rate and rhythm, no murmurs, 2+ equal pulses of the extremities, normal S 1and S 2  Gastrointestinal: Nondistended, soft, non-tender,   Musculoskeletal: mild decrease range of motion to right and left knee  s/p sx intervention,   Extremities: normal extremities,  incision covered splint and immobilizer bilaterally   Skin: warm, dry, intact  Neurological: alert/oriented x 3, speech clear  Psychiatric: appropriate mood and behavior         Last Recorded Vitals  /74   Pulse 56   Temp 36.7 °C (98.1 °F)   Resp 16   Wt 83 kg (182 lb 15.7 oz)   SpO2 97%     Relevant Results  Scheduled medications  acetaminophen, 650 mg, oral, q6h SANDI  ceFAZolin, 2 g, intravenous, q8h  docusate sodium, 100 mg, oral, BID  ketorolac, 30 mg, intravenous, q6h  linaCLOtide, 145 mcg, oral, Daily  magnesium oxide, 400 mg, oral, Daily  pantoprazole, 20 mg, oral, Daily  [START ON 6/26/2024] rivaroxaban, 10 mg, oral, Daily  tranexamic acid, 1,950 mg, oral, Once  [START ON 6/26/2024] tranexamic acid, 1,950 mg, oral, Once      Continuous medications  lactated Ringer's, 50 mL/hr, Last Rate: 50 mL/hr (06/25/24 0625)  lactated Ringer's, 50 mL/hr, Last Rate: 50 mL/hr (06/25/24 1141)  oxygen, 2 L/min, Last Rate: Stopped (06/25/24 1200)      PRN medications  PRN medications: benzocaine-menthol, bisacodyl, cyclobenzaprine, diphenhydrAMINE, HYDROmorphone, morphine, naloxone, ondansetron ODT **OR** ondansetron, oxyCODONE, oxyCODONE, oxyCODONE, promethazine (Phenergan) 6.25 mg in sodium chloride 0.9% 50 mL IV, traZODone    Results for orders placed or performed during the hospital encounter of 06/25/24 (from the past 24 hour(s))   POCT pregnancy, urine   Result Value Ref Range    Preg Test, Ur Negative Negative          Assessment/Plan     # Bilateral Knee Arthoplasty  Bilateral Knee pain   Admit to Orthopedics Team   Hospitalist team Consulted    DVTp and Pain management per Ortho   PT/OT evaluation  and treatment   CBC/BMP as appropriate, review results  Pulmonary Toileting   Follow up as needed outpatient with Orthopedics     # IBS - C  Continue Linzess     # Anxiety / depression   Continue home medications    Thank you for consult  Medicine to continue to follow  Call for acute needs    Time spent  46  minutes obtaining labs, imaging, recommendations, interview, assessment, examination, medication review/ordering, and EMR review.    Plan of care was discussed extensively with patient, RN and spouse. Patient verbalized understanding through teach back method. All questions and concerns addressed upon examination.     Of note, this documentation is completed using the Dragon Dictation system (voice recognition software). There may be spelling and/or grammatical errors that were not corrected prior to final submission.      Divya Mclaughlin, APRN-CNP

## 2024-06-25 NOTE — ANESTHESIA PROCEDURE NOTES
Peripheral Block    Patient location during procedure: holding area  Start time: 6/25/2024 7:20 AM  End time: 6/25/2024 7:30 AM  Reason for block: post-op pain management  Staffing  Performed: attending   Authorized by: Miky Rey MD    Performed by: Miky Rey MD  Preanesthetic Checklist  Completed: patient identified, IV checked, site marked, risks and benefits discussed, surgical consent, monitors and equipment checked, pre-op evaluation and timeout performed   Timeout performed at: 6/25/2024 7:10 AM  Peripheral Block  Patient position: laying flat  Prep: ChloraPrep and site prepped and draped  Patient monitoring: continuous pulse ox, heart rate and cardiac monitor  Block type: adductor canal  Laterality: B/L  Injection technique: single-shot  Guidance: ultrasound guided  Local infiltration: lidocaine  Infiltration strength: 1 %  Dose: 1 mL  Needle  Needle localization: anatomical landmarks and ultrasound guidance  Catheter type: open end  Assessment  Injection assessment: negative aspiration for heme, local visualized surrounding nerve on ultrasound, no paresthesia on injection, incremental injection and transient paresthesias  Paresthesia pain: none  Heart rate change: no  Slow fractionated injection: yes  Additional Notes  Pt identified, time out performed, extremity marked.  Patient supine.  Chloroprep.  1% lidocaine 1cc subcutaneous.  Subsartorial technique.  21g needle via lateral, IP approach at mid-thigh.  10cc of 0.5% ropivacaine injected in 5cc aliquots after negative aspiration lateral to femoral artery in the adductor canal on right side.  Procedure repeated on left side.  Images taken, no complications noted

## 2024-06-26 LAB
ANION GAP SERPL CALC-SCNC: 10 MMOL/L (ref 10–20)
BUN SERPL-MCNC: 17 MG/DL (ref 6–23)
CALCIUM SERPL-MCNC: 8.2 MG/DL (ref 8.6–10.3)
CHLORIDE SERPL-SCNC: 104 MMOL/L (ref 98–107)
CO2 SERPL-SCNC: 27 MMOL/L (ref 21–32)
CREAT SERPL-MCNC: 0.8 MG/DL (ref 0.5–1.05)
EGFRCR SERPLBLD CKD-EPI 2021: 88 ML/MIN/1.73M*2
ERYTHROCYTE [DISTWIDTH] IN BLOOD BY AUTOMATED COUNT: 12.9 % (ref 11.5–14.5)
GLUCOSE SERPL-MCNC: 119 MG/DL (ref 74–99)
HCT VFR BLD AUTO: 32.3 % (ref 36–46)
HGB BLD-MCNC: 10.6 G/DL (ref 12–16)
MCH RBC QN AUTO: 30.1 PG (ref 26–34)
MCHC RBC AUTO-ENTMCNC: 32.8 G/DL (ref 32–36)
MCV RBC AUTO: 92 FL (ref 80–100)
NRBC BLD-RTO: 0 /100 WBCS (ref 0–0)
PLATELET # BLD AUTO: 214 X10*3/UL (ref 150–450)
POTASSIUM SERPL-SCNC: 3.7 MMOL/L (ref 3.5–5.3)
RBC # BLD AUTO: 3.52 X10*6/UL (ref 4–5.2)
SODIUM SERPL-SCNC: 137 MMOL/L (ref 136–145)
WBC # BLD AUTO: 8.4 X10*3/UL (ref 4.4–11.3)

## 2024-06-26 PROCEDURE — 97165 OT EVAL LOW COMPLEX 30 MIN: CPT | Mod: GO

## 2024-06-26 PROCEDURE — 7100000011 HC EXTENDED STAY RECOVERY HOURLY - NURSING UNIT

## 2024-06-26 PROCEDURE — 80048 BASIC METABOLIC PNL TOTAL CA: CPT | Performed by: ORTHOPAEDIC SURGERY

## 2024-06-26 PROCEDURE — 2500000002 HC RX 250 W HCPCS SELF ADMINISTERED DRUGS (ALT 637 FOR MEDICARE OP, ALT 636 FOR OP/ED): Performed by: REGISTERED NURSE

## 2024-06-26 PROCEDURE — 36415 COLL VENOUS BLD VENIPUNCTURE: CPT | Performed by: ORTHOPAEDIC SURGERY

## 2024-06-26 PROCEDURE — 2500000004 HC RX 250 GENERAL PHARMACY W/ HCPCS (ALT 636 FOR OP/ED): Performed by: NURSE PRACTITIONER

## 2024-06-26 PROCEDURE — 2500000004 HC RX 250 GENERAL PHARMACY W/ HCPCS (ALT 636 FOR OP/ED): Performed by: ORTHOPAEDIC SURGERY

## 2024-06-26 PROCEDURE — 2500000002 HC RX 250 W HCPCS SELF ADMINISTERED DRUGS (ALT 637 FOR MEDICARE OP, ALT 636 FOR OP/ED): Performed by: ORTHOPAEDIC SURGERY

## 2024-06-26 PROCEDURE — 2500000001 HC RX 250 WO HCPCS SELF ADMINISTERED DRUGS (ALT 637 FOR MEDICARE OP): Performed by: ORTHOPAEDIC SURGERY

## 2024-06-26 PROCEDURE — 97110 THERAPEUTIC EXERCISES: CPT | Mod: GP | Performed by: PHYSICAL THERAPIST

## 2024-06-26 PROCEDURE — 97116 GAIT TRAINING THERAPY: CPT | Mod: GP | Performed by: PHYSICAL THERAPIST

## 2024-06-26 PROCEDURE — 99231 SBSQ HOSP IP/OBS SF/LOW 25: CPT | Performed by: REGISTERED NURSE

## 2024-06-26 PROCEDURE — 85027 COMPLETE CBC AUTOMATED: CPT | Performed by: ORTHOPAEDIC SURGERY

## 2024-06-26 RX ORDER — CYCLOBENZAPRINE HCL 10 MG
10 TABLET ORAL 3 TIMES DAILY PRN
Start: 2024-06-26 | End: 2024-07-03

## 2024-06-26 RX ORDER — OXYCODONE HYDROCHLORIDE 5 MG/1
5 TABLET ORAL EVERY 6 HOURS PRN
Qty: 28 TABLET | Refills: 0 | Status: SHIPPED | OUTPATIENT
Start: 2024-06-26 | End: 2024-07-03

## 2024-06-26 RX ORDER — DOCUSATE SODIUM 100 MG/1
100 CAPSULE, LIQUID FILLED ORAL 2 TIMES DAILY
Start: 2024-06-26 | End: 2024-07-06

## 2024-06-26 RX ADMIN — DOCUSATE SODIUM 100 MG: 100 CAPSULE, LIQUID FILLED ORAL at 20:52

## 2024-06-26 RX ADMIN — TRANEXAMIC ACID 1950 MG: 650 TABLET ORAL at 05:21

## 2024-06-26 RX ADMIN — ONDANSETRON 4 MG: 2 INJECTION INTRAMUSCULAR; INTRAVENOUS at 05:21

## 2024-06-26 RX ADMIN — MORPHINE SULFATE 2 MG: 2 INJECTION, SOLUTION INTRAMUSCULAR; INTRAVENOUS at 17:00

## 2024-06-26 RX ADMIN — ACETAMINOPHEN 650 MG: 325 TABLET ORAL at 05:21

## 2024-06-26 RX ADMIN — OXYCODONE HYDROCHLORIDE 10 MG: 5 TABLET ORAL at 08:52

## 2024-06-26 RX ADMIN — RIVAROXABAN 10 MG: 10 TABLET, FILM COATED ORAL at 08:52

## 2024-06-26 RX ADMIN — KETOROLAC TROMETHAMINE 30 MG: 30 INJECTION, SOLUTION INTRAMUSCULAR at 00:43

## 2024-06-26 RX ADMIN — CYCLOBENZAPRINE HYDROCHLORIDE 10 MG: 10 TABLET, FILM COATED ORAL at 15:06

## 2024-06-26 RX ADMIN — OXYCODONE HYDROCHLORIDE 10 MG: 5 TABLET ORAL at 19:20

## 2024-06-26 RX ADMIN — ACETAMINOPHEN 650 MG: 325 TABLET ORAL at 12:00

## 2024-06-26 RX ADMIN — DOCUSATE SODIUM 100 MG: 100 CAPSULE, LIQUID FILLED ORAL at 08:52

## 2024-06-26 RX ADMIN — SODIUM CHLORIDE 1000 ML: 9 INJECTION, SOLUTION INTRAVENOUS at 07:52

## 2024-06-26 RX ADMIN — ACETAMINOPHEN 650 MG: 325 TABLET ORAL at 00:43

## 2024-06-26 RX ADMIN — OXYCODONE HYDROCHLORIDE 10 MG: 5 TABLET ORAL at 03:36

## 2024-06-26 RX ADMIN — ACETAMINOPHEN 650 MG: 325 TABLET ORAL at 19:20

## 2024-06-26 RX ADMIN — CEFAZOLIN SODIUM 2 G: 2 INJECTION, SOLUTION INTRAVENOUS at 00:43

## 2024-06-26 RX ADMIN — PANTOPRAZOLE SODIUM 20 MG: 20 TABLET, DELAYED RELEASE ORAL at 08:52

## 2024-06-26 RX ADMIN — OXYCODONE HYDROCHLORIDE 10 MG: 5 TABLET ORAL at 12:51

## 2024-06-26 RX ADMIN — KETOROLAC TROMETHAMINE 30 MG: 30 INJECTION, SOLUTION INTRAMUSCULAR at 05:20

## 2024-06-26 ASSESSMENT — COGNITIVE AND FUNCTIONAL STATUS - GENERAL
DRESSING REGULAR LOWER BODY CLOTHING: A LOT
MOVING FROM LYING ON BACK TO SITTING ON SIDE OF FLAT BED WITH BEDRAILS: A LITTLE
STANDING UP FROM CHAIR USING ARMS: A LOT
WALKING IN HOSPITAL ROOM: A LITTLE
WALKING IN HOSPITAL ROOM: A LITTLE
STANDING UP FROM CHAIR USING ARMS: A LOT
MOVING TO AND FROM BED TO CHAIR: A LITTLE
TURNING FROM BACK TO SIDE WHILE IN FLAT BAD: A LITTLE
DAILY ACTIVITIY SCORE: 16
MOVING FROM LYING ON BACK TO SITTING ON SIDE OF FLAT BED WITH BEDRAILS: A LITTLE
CLIMB 3 TO 5 STEPS WITH RAILING: TOTAL
TOILETING: A LITTLE
CLIMB 3 TO 5 STEPS WITH RAILING: TOTAL
HELP NEEDED FOR BATHING: A LOT
WALKING IN HOSPITAL ROOM: A LOT
MOVING TO AND FROM BED TO CHAIR: A LITTLE
TURNING FROM BACK TO SIDE WHILE IN FLAT BAD: A LITTLE
EATING MEALS: A LITTLE
CLIMB 3 TO 5 STEPS WITH RAILING: TOTAL
MOVING TO AND FROM BED TO CHAIR: A LITTLE
MOVING FROM LYING ON BACK TO SITTING ON SIDE OF FLAT BED WITH BEDRAILS: A LITTLE
EATING MEALS: A LITTLE
MOBILITY SCORE: 15
MOBILITY SCORE: 14
TOILETING: A LITTLE
STANDING UP FROM CHAIR USING ARMS: A LOT
HELP NEEDED FOR BATHING: A LOT
DAILY ACTIVITIY SCORE: 16
DRESSING REGULAR UPPER BODY CLOTHING: A LITTLE
PERSONAL GROOMING: A LITTLE
TURNING FROM BACK TO SIDE WHILE IN FLAT BAD: A LITTLE
MOBILITY SCORE: 15
DRESSING REGULAR UPPER BODY CLOTHING: A LITTLE
PERSONAL GROOMING: A LITTLE
DRESSING REGULAR LOWER BODY CLOTHING: A LOT

## 2024-06-26 ASSESSMENT — PAIN - FUNCTIONAL ASSESSMENT
PAIN_FUNCTIONAL_ASSESSMENT: 0-10

## 2024-06-26 ASSESSMENT — PAIN SCALES - GENERAL
PAINLEVEL_OUTOF10: 5 - MODERATE PAIN
PAINLEVEL_OUTOF10: 8
PAINLEVEL_OUTOF10: 5 - MODERATE PAIN
PAINLEVEL_OUTOF10: 6
PAINLEVEL_OUTOF10: 8
PAINLEVEL_OUTOF10: 6
PAINLEVEL_OUTOF10: 7
PAINLEVEL_OUTOF10: 6
PAINLEVEL_OUTOF10: 8

## 2024-06-26 ASSESSMENT — PAIN DESCRIPTION - ORIENTATION
ORIENTATION: RIGHT;LEFT

## 2024-06-26 ASSESSMENT — ACTIVITIES OF DAILY LIVING (ADL): BATHING_ASSISTANCE: MODERATE

## 2024-06-26 ASSESSMENT — PAIN DESCRIPTION - LOCATION
LOCATION: KNEE

## 2024-06-26 ASSESSMENT — PAIN SCALES - WONG BAKER
WONGBAKER_NUMERICALRESPONSE: HURTS WHOLE LOT
WONGBAKER_NUMERICALRESPONSE: HURTS WHOLE LOT

## 2024-06-26 NOTE — PROGRESS NOTES
Progress Note   TKA    Subjective:     Post-Operative Day: 1 Status Post bilateral Total Knee Arthroplasty  Systemic or Specific Complaints:No Complaints    Objective:     Visit Vitals  /59   Pulse 66   Temp 36 °C (96.8 °F)   Resp 16       General: alert and oriented, in no acute distress, appears stated age, and cooperative   Wound: no erythema, no edema, no drainage, and dressing CDI   Motion: Painful range of Motion   DVT Exam: No evidence of DVT seen on physical exam.  Negative Lucy's sign.  No significant calf/ankle edema.       Knee swollen but thigh soft to palpation. Moving foot and ankle. Good distal pulses.      Data Review  Recent Results (from the past 24 hour(s))   CBC    Collection Time: 06/26/24  5:47 AM   Result Value Ref Range    WBC 8.4 4.4 - 11.3 x10*3/uL    nRBC 0.0 0.0 - 0.0 /100 WBCs    RBC 3.52 (L) 4.00 - 5.20 x10*6/uL    Hemoglobin 10.6 (L) 12.0 - 16.0 g/dL    Hematocrit 32.3 (L) 36.0 - 46.0 %    MCV 92 80 - 100 fL    MCH 30.1 26.0 - 34.0 pg    MCHC 32.8 32.0 - 36.0 g/dL    RDW 12.9 11.5 - 14.5 %    Platelets 214 150 - 450 x10*3/uL   Basic metabolic panel    Collection Time: 06/26/24  5:47 AM   Result Value Ref Range    Glucose 119 (H) 74 - 99 mg/dL    Sodium 137 136 - 145 mmol/L    Potassium 3.7 3.5 - 5.3 mmol/L    Chloride 104 98 - 107 mmol/L    Bicarbonate 27 21 - 32 mmol/L    Anion Gap 10 10 - 20 mmol/L    Urea Nitrogen 17 6 - 23 mg/dL    Creatinine 0.80 0.50 - 1.05 mg/dL    eGFR 88 >60 mL/min/1.73m*2    Calcium 8.2 (L) 8.6 - 10.3 mg/dL         Assessment:     Status Post bilateral Total Knee Arthroplasty. Doing well postoperatively.     Acute postoperative pain: Reports mild to moderate pain to operative extremity.  Exacerbated by movement, relieved by rest ice and pain medication.  Continue current pain management.    Patient blood pressure low this am with SBP 80's. Patient asymptomatic. Will give one liter fluid bolus and continue to monitor blood pressure.       Plan:       1: Continues current post-op course :  2:  Continue Deep venous thrombosis prophylaxis: Xarelto 10 mg once daily for 28 days   3:  Continue physical therapy: WBAT to operative extremities  4:  Continue Pain Control  5.   Discharge planning: Plans to go to acute rehab at discharge, pre-auth pending. SW and TCC following for discharge planning.     Gonsalo Gregg, APRN-CNP

## 2024-06-26 NOTE — PROGRESS NOTES
Physical Therapy    Physical Therapy Treatment    Patient Name: Chyna Lorenzo  MRN: [id number]  Today's Date: 6/26/2024  Time Calculation  Start Time: 1105  Stop Time: 1155  Time Calculation (min): 50 min    Assessment/Plan     PT Plan  Inpatient/Swing Bed or Outpatient: Inpatient  PT Plan  Treatment/Interventions: Bed mobility, Transfer training, Gait training, Stair training, Balance training, Strengthening, Range of motion, Therapeutic exercise, Therapeutic activity, Home exercise program  PT Plan: Ongoing PT  PT Frequency: BID  PT Discharge Recommendations: High intensity level of continued care  Equipment Recommended upon Discharge: Wheeled walker  PT Recommended Transfer Status: Assist x1  PT - OK to Discharge: Yes (to next level of care)      General Visit Information:   PT  Visit  PT Received On: 06/26/24  General  Reason for Referral: s/p B/L TKA  Referred By: JOSE MANUEL Nur  Past Medical History Relevant to Rehab: OA, B/L severe patellofemoral sydrome, breast Sx, obesity, ADHD  Family/Caregiver Present: No  Caregiver Feedback: spouse present  Prior to Session Communication: Bedside nurse  Patient Position Received: Up in chair, Alarm on  General Comment: Pt. reports increased pain today vs yesterday; L knee pain > R knee pain. Pt. had another episode of hypotension this AM, requiring fluid bolus, with improvement noted; /59 HR 72 (seated)    Subjective   Precautions:  Precautions  LE Weight Bearing Status: Weight Bearing as Tolerated (BLE)  Medical Precautions: Fall precautions  Post-Surgical Precautions:  (knee immobilizer to be worn until +SLR and/or no sign of buckling)  Precautions Comment: knee immobilizers were not worn during session; pt. did not show signs of buckling         Objective   Pain:  Pain Assessment  Pain Assessment: 0-10  0-10 (Numeric) Pain Score:  (pain ranging from 4-10 depending on activity; L knee pain > R knee pain)  Pain Type: Surgical pain  Pain Location: Knee  Pain  Orientation: Right, Left  Pain Interventions: Repositioned, Cold pack, Distraction, Emotional support (activity modification, as needed; RN notified)  Cognition:  Cognition  Overall Cognitive Status:  (drowsy)  Orientation Level: Oriented X4       Treatments:  Therapeutic Exercise  Therapeutic Exercise Performed:  (BLE APs x10-15, LAQ (AAROM) x5 BLE, seated heel slides ~ 8 reps BLE with 20-30 sec hold in flexion; ther-ex limited by increased pain)    Bed Mobility  Bed Mobility:  (supine to sit with modAx1 for assist with BLEs)    Ambulation/Gait Training  Ambulation/Gait Training Performed:  (Amb. 25'x2 with ww and minAx1; slow, flexed posture, decreased knee flexion, decreased step length and foot clearance bilaterally; limited by fatigue and increased pain)  Transfers  Transfer:  (sit to/from stand with ww and modAx1; increased time and effort required; cues for technique)    Outcome Measures:  [facility] Basic Mobility  Turning from your back to your side while in a flat bed without using bedrails: A little  Moving from lying on your back to sitting on the side of a flat bed without using bedrails: A little  Moving to and from bed to chair (including a wheelchair): A little  Standing up from a chair using your arms (e.g. wheelchair or bedside chair): A lot  To walk in hospital room: A little  Climbing 3-5 steps with railing: Total  Basic Mobility - Total Score: 15    Education Documentation  No documentation found.  Education Comments  No comments found.        OP EDUCATION:  Outpatient Education  Individual(s) Educated: Patient  Education Provided: Home Exercise Program (transfer technique)  Patient Response to Education: Patient/Caregiver Performed Return Demonstration of Exercises/Activities    Encounter Problems       Encounter Problems (Active)       Impaired mobility s/p B/L TKR        Perform all bed mobility with mod. indep.       Start:  06/25/24    Expected End:  07/09/24            Perform all transfers  with ww and mod. indep.  (Progressing)       Start:  06/25/24    Expected End:  07/09/24            Patient will ambulate >/= 75 ft with ww and mod. indep. (Progressing)       Start:  06/25/24    Expected End:  07/09/24            Patient will ascend/descend 2 steps with HR and SPC with minAx1       Start:  06/25/24    Expected End:  07/09/24            Patient will perform BLE HEP with indep. (Progressing)       Start:  06/25/24    Expected End:  07/09/24            Patient will achieve 0-90 degrees B/L knee ROM  (Progressing)       Start:  06/25/24    Expected End:  07/09/24

## 2024-06-26 NOTE — PROGRESS NOTES
Chyna Lorenzo is a 53 y.o. female on day 0 of admission presenting with DJD (degenerative joint disease) of knee.      Subjective   Patient examined and seen. Alert and oriented x3, resting comfortably.  Patient denies chest pain, shortness of breath, palpitations, abdominal pain, fever or chills.  States pain is well controlled and doing ok with therapy today.        Objective     Last Recorded Vitals  /59 (BP Location: Left arm, Patient Position: Sitting)   Pulse 72   Temp 35.8 °C (96.4 °F) (Temporal)   Resp 16   Wt 83 kg (182 lb 15.7 oz)   SpO2 97%   Intake/Output last 3 Shifts:    Intake/Output Summary (Last 24 hours) at 6/26/2024 1206  Last data filed at 6/26/2024 0852  Gross per 24 hour   Intake 3881.59 ml   Output --   Net 3881.59 ml       Admission Weight  Weight: 83 kg (182 lb 15.7 oz) (06/25/24 0546)    Daily Weight  06/25/24 : 83 kg (182 lb 15.7 oz)    Image Results  XR knee 1-2 views bilateral  Narrative: Interpreted By:  Daysi Alvarez,   STUDY:  XR KNEE 1-2 VIEWS BILATERAL; ;  6/25/2024 10:43 am      INDICATION:  post op.      COMPARISON:  None.      ACCESSION NUMBER(S):  [id number]      ORDERING CLINICIAN:  MONICA TSAI      FINDINGS:  Total bilateral knee arthroplasty. Gas and swelling in the joint and  soft tissues compatible with postoperative change.      Impression: Postoperative changes bilateral total knee arthroplasty.          MACRO:  None      Signed by: Daysi Alvarez 6/25/2024 12:17 PM  Dictation workstation:   [id number]      Physical Exam    Constitutional: Well developed, awake/alert/oriented x3, cooperative  Respiratory/Thorax: Patent airways,  normal breath sounds   Cardiovascular: Regular, normal S 1and S 2  Musculoskeletal: mild decrease range of motion to right and left knee  s/p sx intervention,   Extremities: normal extremities,  incision covered splint and immobilizer bilaterally   Skin: warm, dry, intact  Neurological: alert/oriented x 3, speech  clear  Psychiatric: appropriate mood and behavior    Relevant Results  Scheduled medications  acetaminophen, 650 mg, oral, q6h SANDI  docusate sodium, 100 mg, oral, BID  linaCLOtide, 145 mcg, oral, Daily  magnesium oxide, 400 mg, oral, Daily  pantoprazole, 20 mg, oral, Daily  rivaroxaban, 10 mg, oral, Daily      Continuous medications  lactated Ringer's, 50 mL/hr, Last Rate: 50 mL/hr (06/26/24 0636)  oxygen, 2 L/min, Last Rate: Stopped (06/25/24 1200)      PRN medications  PRN medications: benzocaine-menthol, bisacodyl, cyclobenzaprine, diphenhydrAMINE, HYDROmorphone, morphine, naloxone, ondansetron ODT **OR** ondansetron, oxyCODONE, oxyCODONE, oxyCODONE, promethazine (Phenergan) 6.25 mg in sodium chloride 0.9% 50 mL IV, traZODone    Results for orders placed or performed during the hospital encounter of 06/25/24 (from the past 24 hour(s))   CBC   Result Value Ref Range    WBC 8.4 4.4 - 11.3 x10*3/uL    nRBC 0.0 0.0 - 0.0 /100 WBCs    RBC 3.52 (L) 4.00 - 5.20 x10*6/uL    Hemoglobin 10.6 (L) 12.0 - 16.0 g/dL    Hematocrit 32.3 (L) 36.0 - 46.0 %    MCV 92 80 - 100 fL    MCH 30.1 26.0 - 34.0 pg    MCHC 32.8 32.0 - 36.0 g/dL    RDW 12.9 11.5 - 14.5 %    Platelets 214 150 - 450 x10*3/uL   Basic metabolic panel   Result Value Ref Range    Glucose 119 (H) 74 - 99 mg/dL    Sodium 137 136 - 145 mmol/L    Potassium 3.7 3.5 - 5.3 mmol/L    Chloride 104 98 - 107 mmol/L    Bicarbonate 27 21 - 32 mmol/L    Anion Gap 10 10 - 20 mmol/L    Urea Nitrogen 17 6 - 23 mg/dL    Creatinine 0.80 0.50 - 1.05 mg/dL    eGFR 88 >60 mL/min/1.73m*2    Calcium 8.2 (L) 8.6 - 10.3 mg/dL       Assessment/Plan      # Bilateral Knee Arthoplasty  Bilateral Knee pain   Admit to Orthopedics Team   Hospitalist team Consulted   DVTp and Pain management per Ortho   PT/OT evaluation  and treatment   CBC/BMP as appropriate, review results  Pulmonary Toileting   Follow up as needed outpatient with Orthopedics      # IBS - C  Continue Linzess      # Anxiety /  depression   Continue home medications     Thank you for consult  MEDICINE TO SIGN OFF   Call for acute needs     Time spent  26  minutes obtaining labs, imaging, recommendations, interview, assessment, examination, medication review/ordering, and EMR review.     Plan of care was discussed extensively with patient, and RN. Patient verbalized understanding through teach back method. All questions and concerns addressed upon examination.      Of note, this documentation is completed using the Dragon Dictation system (voice recognition software). There may be spelling and/or grammatical errors that were not corrected prior to final submission.        Divya Mclaughlin, APRN-CNP                     Divya Mclaughlin, APRN-CNP

## 2024-06-26 NOTE — PROGRESS NOTES
06/26/24 0954   Discharge Planning   Home or Post Acute Services Post acute facilities (Rehab/SNF/etc)   Type of Post Acute Facility Services Rehab;Skilled nursing   Patient [facility] to be discharged [facility]: [facility]   Does the patient need discharge transport arranged? Yes   RoundTrip coordination needed? Yes   What day is the transport expected? 06/26/24     Pt is s/p POD #1 Elective Bilateral knee replacement surgery 6/25/24 with Dr. Garrett Nur. [facility] scores are PT (8) OT (16) and recommending continued therapy at high level/intensity. Pt states discharge preference is Acute rehab and facility of choice is [facility] who confirms able to accept pending insurance authorization. [facility] Liaison notified that PT/OT notes are entered and requested insurance precert started. CT team will continue to monitor case for progression and DC planning.

## 2024-06-26 NOTE — PROGRESS NOTES
Per [facility], peer to peer denial upheld and pt will need SNF choice now.  Provided pt and spouse with SNF list and answered their questions and offered freedom of choice.  Explained difference between HHC and SNF.  Per pt and spouse, they will review list and provide SNF choices tomorrow AM.  SACHIN Dang

## 2024-06-26 NOTE — PROGRESS NOTES
Physical Therapy    Physical Therapy Treatment    Patient Name: Chyna Lorenzo  MRN: [id number]  Today's Date: 6/26/2024  Time Calculation  Start Time: 1332  Stop Time: 1400  Time Calculation (min): 28 min    Assessment/Plan   PT Assessment  End of Session Communication: Bedside nurse  End of Session Patient Position: Bed, 2 rail up  PT Plan  Inpatient/Swing Bed or Outpatient: Inpatient  PT Plan  Treatment/Interventions: Bed mobility, Transfer training, Gait training, Stair training, Balance training, Strengthening, Range of motion, Therapeutic exercise, Therapeutic activity, Home exercise program  PT Plan: Ongoing PT  PT Frequency: BID  PT Discharge Recommendations:  (moderate to high intensity)  Equipment Recommended upon Discharge: Wheeled walker  PT Recommended Transfer Status: Assist x1  PT - OK to Discharge: Yes (to next level of care)      General Visit Information:   PT  Visit  PT Received On: 06/26/24 (Tx time 2062-3782)  General  Family/Caregiver Present: No  Patient Position Received: Up in chair, Alarm off, not on at start of session  General Comment: Pt. states that she was not able to sleep last night; c/o increased fatigue and increased B/L knee pain (L>R)    Subjective   Precautions:  Precautions  LE Weight Bearing Status: Weight Bearing as Tolerated (BLE)  Medical Precautions: Fall precautions  Post-Surgical Precautions:  (knee immobilizer to be worn until +SLR and/or no sign of buckling)  Precautions Comment: knee immobilizers were not worn during session; pt. did not show signs of buckling    Pain:  Pain Assessment  Pain Assessment: 0-10  0-10 (Numeric) Pain Score: 7  Pain Type: Surgical pain  Pain Location: Knee  Pain Orientation: Right, Left  Pain Interventions: Cold pack, Repositioned, Distraction (activity modification- as needed; RN notified)  Cognition:  Cognition  Overall Cognitive Status:  (drowsy)  Orientation Level: Oriented X4 (forgetful)          Treatments:  Therapeutic  Exercise  Therapeutic Exercise Performed:  (BLE APs x20 reps, B/L SAQ with AAROM x10 reps, GS x10, B/L heel slides with AA from therapist and sheet assist x5 reps; ther-ex limited by increased pain)              Bed Mobility  Bed Mobility:  (supine to sit with modAx1 for BLE lifting assistance)    Ambulation/Gait Training  Ambulation/Gait Training Performed:  (Amb. 15'x2 with ww and CGA/minAx1; flexed posture; mostly non-reciprocal gait, progressing to reciprocal gait)  Transfers  Transfer:  (sit to/from stand with ww and modAx1; transfers performed to/from bed, recliner and toilet; cues for technique)         Outcome Measures:  [facility] Basic Mobility  Turning from your back to your side while in a flat bed without using bedrails: A little  Moving from lying on your back to sitting on the side of a flat bed without using bedrails: A little  Moving to and from bed to chair (including a wheelchair): A little  Standing up from a chair using your arms (e.g. wheelchair or bedside chair): A lot  To walk in hospital room: A little  Climbing 3-5 steps with railing: Total  Basic Mobility - Total Score: 15    Education Documentation  No documentation found.  Education Comments  No comments found.        OP EDUCATION:  Outpatient Education  Individual(s) Educated: Patient  Education Provided: Home Exercise Program  Patient Response to Education: Patient/Caregiver Performed Return Demonstration of Exercises/Activities    Encounter Problems       Encounter Problems (Active)       Impaired mobility s/p B/L TKR        Perform all bed mobility with mod. indep. (Progressing)       Start:  06/25/24    Expected End:  07/09/24            Perform all transfers with ww and mod. indep.  (Progressing)       Start:  06/25/24    Expected End:  07/09/24            Patient will ambulate >/= 75 ft with ww and mod. indep. (Progressing)       Start:  06/25/24    Expected End:  07/09/24            Patient will ascend/descend 2 steps with HR and SPC  with minAx1       Start:  06/25/24    Expected End:  07/09/24            Patient will perform BLE HEP with indep. (Progressing)       Start:  06/25/24    Expected End:  07/09/24            Patient will achieve 0-90 degrees B/L knee ROM  (Progressing)       Start:  06/25/24    Expected End:  07/09/24

## 2024-06-26 NOTE — PROGRESS NOTES
Occupational Therapy    Evaluation    Patient Name: Chyna Lorenzo  MRN: [id number]  Today's Date: 6/26/2024  Time Calculation  Start Time: 0829  Stop Time: 0854  Time Calculation (min): 25 min      Assessment:  OT Assessment: Limited by pain and balance deficits.  End of Session Communication: Bedside nurse (RN in the room at end of therapy evaluation.)  End of Session Patient Position: Up in chair, Alarm on (B/L KIs in place with ice to B/L knees.)  OT Assessment Results: Decreased ADL status, Decreased endurance, Decreased functional mobility  Plan:  Treatment Interventions: ADL retraining, Functional transfer training, UE strengthening/ROM, Endurance training  OT Frequency: 3 times per week  OT Discharge Recommendations: High intensity level of continued care  OT - OK to Discharge: Yes (Once medically appropriate.)  Treatment Interventions: ADL retraining, Functional transfer training, UE strengthening/ROM, Endurance training    Subjective   General:  General  Reason for Referral: Recent surgery  Referred By: Dr. Nur  Past Medical History Relevant to Rehab: OA, B/L severe patellofemoral sydrome, breast Sx, obesity, ADHD  Prior to Session Communication: Bedside nurse (Cleared for therapy evaluation by RN.)  Patient Position Received: Up in chair, Alarm on  General Comment: Admit for elective B/L TKA with Dr. OZZIE Nur 6/25  Precautions:  Medical Precautions: Fall precautions  Precautions Comment: WBAT B/L LEs. B/L KIs until SLR and/or no concern for buckling.     Pain:  Pain Assessment  Pain Assessment: 0-10  0-10 (Numeric) Pain Score: 5 - Moderate pain  Pain Type: Surgical pain  Pain Location: Knee  Pain Orientation: Right, Left    Objective   Cognition:  Overall Cognitive Status: Within Functional Limits           Home Living:  Home Living Comments: Patient lives with spouse in a 1 story house with 2 CLEMENCIA with a HR. Walk in shower with grab bars. Laundry on the main level.  Prior Function:  Prior Function  Comments: Patient ambulates independently without a device, owns 2 FWWs. Independent with ADLs and IADLs. Denies falls in the past 3 months. Patient drives. Works as an RN.     ADL:  Eating Assistance: Independent  Grooming Assistance: Minimal (Min A for standing balance.)  Bathing Assistance: Moderate  UE Dressing Assistance:  (Setup)  LE Dressing Assistance: Moderate (Patient completed forward trunk flexion to thread LEs, min A for standing balance with unilateral UE support on the FWW to pull clothing up over hips. Patient stands with trunk flexed forward.)  Toileting Assistance with Device: Minimal     Bed Mobility/Transfers:      Transfers  Transfer: Yes  Transfer 1  Technique 1: Sit to stand, Stand to sit  Transfer Device 1:  (FWW)  Trials/Comments 1: Patient completed sit <> stand from the recliner with a FWW at min A level. Completed without B/L KIs donned, cues for safe hand placement.     Standing Balance:  Dynamic Standing Balance  Dynamic Standing-Comments: Fair      Strength:  Strength Comments: B/L UE MMT WFL.     Extremities: RUE   RUE : Within Functional Limits and LUE   LUE: Within Functional Limits    Outcome Measures:[facility] Daily Activity  Putting on and taking off regular lower body clothing: A lot  Bathing (including washing, rinsing, drying): A lot  Putting on and taking off regular upper body clothing: A little  Toileting, which includes using toilet, bedpan or urinal: A little  Taking care of personal grooming such as brushing teeth: A little  Eating Meals: A little  Daily Activity - Total Score: 16    Education Documentation  ADL Training, taught by Medina Pickering OT at 6/26/2024  9:11 AM.  Learner: Patient  Readiness: Acceptance  Method: Explanation  Response: Verbalizes Understanding, Demonstrated Understanding  Comment: Compensatory ADL techniques, walker management during ADLs/transfers.    EDUCATION:  Education  Individual(s) Educated: Patient  Education Provided: POC discussed and  agreed upon, Risk and benefits of OT discussed with patient or other, Fall precautons  Patient Response to Education: Patient/Caregiver Verbalized Understanding of Information    Goals:  Encounter Problems       Encounter Problems (Active)       OT Goals       Patient will complete household distance functional mobility with a FWW at mod I level.  (Progressing)       Start:  06/26/24    Expected End:  07/10/24            Patient will complete functional transfers with a FWW at mod I level.  (Progressing)       Start:  06/26/24    Expected End:  07/10/24            Patient will complete lower body dressing at a mod I level.  (Progressing)       Start:  06/26/24    Expected End:  07/10/24            Patient will demonstrate fair + dynamic standing balance during functional tasks.  (Progressing)       Start:  06/26/24    Expected End:  07/10/24            Patient will tolerate standing greater than 5 minutes during functional tasks. (Progressing)       Start:  06/26/24    Expected End:  07/10/24

## 2024-06-26 NOTE — CARE PLAN
Problem: Fall/Injury  Goal: Not fall by end of shift  Outcome: Progressing  Goal: Be free from injury by end of the shift  Outcome: Progressing  Goal: Verbalize understanding of personal risk factors for fall in the hospital  Outcome: Progressing  Goal: Verbalize understanding of risk factor reduction measures to prevent injury from fall in the home  Outcome: Progressing  Goal: Use assistive devices by end of the shift  Outcome: Progressing  Goal: Pace activities to prevent fatigue by end of the shift  Outcome: Progressing     Problem: Pain  Goal: Takes deep breaths with improved pain control throughout the shift  Outcome: Progressing  Goal: Turns in bed with improved pain control throughout the shift  Outcome: Progressing  Goal: Walks with improved pain control throughout the shift  Outcome: Progressing  Goal: Performs ADL's with improved pain control throughout shift  Outcome: Progressing  Goal: Participates in PT with improved pain control throughout the shift  Outcome: Progressing  Goal: Free from opioid side effects throughout the shift  Outcome: Progressing  Goal: Free from acute confusion related to pain meds throughout the shift  Outcome: Progressing   The patient's goals for the shift include      The clinical goals for the shift include Safety and pain management

## 2024-06-26 NOTE — PROGRESS NOTES
06/26/24 1536   Discharge Planning   Home or Post Acute Services Post acute facilities (Rehab/SNF/etc)   Type of Post Acute Facility Services Rehab;Skilled nursing   Patient [facility] to be [facility]: [facility]     Received phone call from Conor at [employer], stating [employer] , has intent to deny for  [facility]. Peer 2 Peer offered until today 6/26 at 6:30 PM Central time - call [phone]. There are no extensions or setups, this is direct P2P call. Member Name : Chyna Lorenzo, YOB: 1970, Member ID # Member ID [id number]. Orthopedic CNP notified and provided this information.

## 2024-06-27 VITALS
TEMPERATURE: 98.2 F | OXYGEN SATURATION: 99 % | BODY MASS INDEX: 29.41 KG/M2 | RESPIRATION RATE: 16 BRPM | WEIGHT: 182.98 LBS | HEIGHT: 66 IN | DIASTOLIC BLOOD PRESSURE: 62 MMHG | HEART RATE: 100 BPM | SYSTOLIC BLOOD PRESSURE: 130 MMHG

## 2024-06-27 PROCEDURE — 2500000001 HC RX 250 WO HCPCS SELF ADMINISTERED DRUGS (ALT 637 FOR MEDICARE OP): Performed by: ORTHOPAEDIC SURGERY

## 2024-06-27 PROCEDURE — 97116 GAIT TRAINING THERAPY: CPT | Mod: GP,CQ

## 2024-06-27 PROCEDURE — 2500000002 HC RX 250 W HCPCS SELF ADMINISTERED DRUGS (ALT 637 FOR MEDICARE OP, ALT 636 FOR OP/ED): Performed by: REGISTERED NURSE

## 2024-06-27 PROCEDURE — 97110 THERAPEUTIC EXERCISES: CPT | Mod: GP,CQ

## 2024-06-27 PROCEDURE — 97535 SELF CARE MNGMENT TRAINING: CPT | Mod: GO,CO

## 2024-06-27 PROCEDURE — 7100000011 HC EXTENDED STAY RECOVERY HOURLY - NURSING UNIT

## 2024-06-27 PROCEDURE — 2500000002 HC RX 250 W HCPCS SELF ADMINISTERED DRUGS (ALT 637 FOR MEDICARE OP, ALT 636 FOR OP/ED): Performed by: ORTHOPAEDIC SURGERY

## 2024-06-27 RX ADMIN — ACETAMINOPHEN 650 MG: 325 TABLET ORAL at 12:49

## 2024-06-27 RX ADMIN — ACETAMINOPHEN 650 MG: 325 TABLET ORAL at 00:27

## 2024-06-27 RX ADMIN — OXYCODONE HYDROCHLORIDE 10 MG: 5 TABLET ORAL at 00:27

## 2024-06-27 RX ADMIN — ACETAMINOPHEN 650 MG: 325 TABLET ORAL at 06:00

## 2024-06-27 RX ADMIN — OXYCODONE HYDROCHLORIDE 10 MG: 5 TABLET ORAL at 12:49

## 2024-06-27 RX ADMIN — DOCUSATE SODIUM 100 MG: 100 CAPSULE, LIQUID FILLED ORAL at 08:37

## 2024-06-27 RX ADMIN — OXYCODONE HYDROCHLORIDE 10 MG: 5 TABLET ORAL at 08:37

## 2024-06-27 RX ADMIN — OXYCODONE HYDROCHLORIDE 10 MG: 5 TABLET ORAL at 04:35

## 2024-06-27 RX ADMIN — CYCLOBENZAPRINE HYDROCHLORIDE 10 MG: 10 TABLET, FILM COATED ORAL at 04:35

## 2024-06-27 RX ADMIN — RIVAROXABAN 10 MG: 10 TABLET, FILM COATED ORAL at 08:37

## 2024-06-27 RX ADMIN — PANTOPRAZOLE SODIUM 20 MG: 20 TABLET, DELAYED RELEASE ORAL at 08:37

## 2024-06-27 ASSESSMENT — COGNITIVE AND FUNCTIONAL STATUS - GENERAL
WALKING IN HOSPITAL ROOM: A LOT
DRESSING REGULAR LOWER BODY CLOTHING: A LOT
DAILY ACTIVITIY SCORE: 15
CLIMB 3 TO 5 STEPS WITH RAILING: TOTAL
STANDING UP FROM CHAIR USING ARMS: A LOT
TOILETING: A LOT
MOBILITY SCORE: 14
DRESSING REGULAR UPPER BODY CLOTHING: A LOT
TURNING FROM BACK TO SIDE WHILE IN FLAT BAD: A LITTLE
MOVING TO AND FROM BED TO CHAIR: A LITTLE
MOVING FROM LYING ON BACK TO SITTING ON SIDE OF FLAT BED WITH BEDRAILS: A LITTLE
HELP NEEDED FOR BATHING: A LOT
PERSONAL GROOMING: A LITTLE

## 2024-06-27 ASSESSMENT — PAIN DESCRIPTION - ORIENTATION
ORIENTATION: RIGHT;LEFT

## 2024-06-27 ASSESSMENT — PAIN SCALES - GENERAL
PAINLEVEL_OUTOF10: 8
PAINLEVEL_OUTOF10: 5 - MODERATE PAIN
PAINLEVEL_OUTOF10: 6
PAINLEVEL_OUTOF10: 8
PAINLEVEL_OUTOF10: 5 - MODERATE PAIN
PAINLEVEL_OUTOF10: 7
PAINLEVEL_OUTOF10: 6
PAINLEVEL_OUTOF10: 7

## 2024-06-27 ASSESSMENT — PAIN - FUNCTIONAL ASSESSMENT
PAIN_FUNCTIONAL_ASSESSMENT: 0-10

## 2024-06-27 ASSESSMENT — PAIN DESCRIPTION - DESCRIPTORS
DESCRIPTORS: ACHING;SORE
DESCRIPTORS: ACHING

## 2024-06-27 ASSESSMENT — PAIN DESCRIPTION - LOCATION
LOCATION: KNEE

## 2024-06-27 ASSESSMENT — ACTIVITIES OF DAILY LIVING (ADL): HOME_MANAGEMENT_TIME_ENTRY: 17

## 2024-06-27 NOTE — PROGRESS NOTES
Progress Note   TKA    Subjective:     Post-Operative Day: 2 Status Post bilateral Total Knee Arthroplasty  Systemic or Specific Complaints:No Complaints    Objective:     Visit Vitals  /62 (Patient Position: Sitting)   Pulse 100   Temp 36.8 °C (98.2 °F)   Resp 16       General: alert and oriented, in no acute distress, appears stated age, and cooperative   Wound: no erythema, no edema, no drainage, and dressing CDI   Motion: Painful range of Motion   DVT Exam: No evidence of DVT seen on physical exam.  Negative Lucy's sign.  No significant calf/ankle edema.       Knee swollen but thigh soft to palpation. Moving foot and ankle. Good distal pulses.      Data Review  No results found for this or any previous visit (from the past 24 hour(s)).        Assessment:     Status Post bilateral Total Knee Arthroplasty. Doing well postoperatively.     Acute postoperative pain: Reports mild to moderate pain to operative extremity.  Exacerbated by movement, relieved by rest ice and pain medication.  Continue current pain management.    Improvement in blood pressure since yesterday after fluid bolus.       Plan:      1: Continues current post-op course :  2:  Continue Deep venous thrombosis prophylaxis: Xarelto 10 mg once daily for 28 days   3:  Continue physical therapy: WBAT to operative extremities  4:  Continue Pain Control  5.   Discharge planning: Plans to go to acute rehab at discharge, pre-auth pending. SW and [facility] following for discharge planning.     Gonsalo Gregg, APRN-CNP

## 2024-06-27 NOTE — PROGRESS NOTES
06/27/24 1042   Discharge Planning   Home or Post Acute Services In home services   Type of Post Acute Facility Services Rehab;Skilled nursing   Patient expects to be discharged to: [facility]   Does the patient need discharge transport arranged? Yes   RoundTrip coordination needed? Yes   Has discharge transport been arranged? Yes   What day is the transport expected? 06/27/24   What time is the transport expected? 1300     Pt is s/p POD #2 Elective Bilateral knee replacement surgery 6/25/24 with Dr. Garrett Nur. Updated [facility] scores are PT (15) OT (16) and recommending continued therapy at mod-high level/intensity. Pts insurance denied Acute rehab, P2P was completed and denial was upheld. Pt now prefers SNF and FOC is #1 [place] [facility] and #2 [facility] states no beds available, [facility] and instant insurance auth for SNF received. DSC notified to set up cot transport for 1 PM and send 7000 in [place]. Pt notified and in agreement to DC to SNF today. Pts RN given report number to [facility] [phone].

## 2024-06-27 NOTE — PROGRESS NOTES
Physical Therapy    Physical Therapy Treatment    Patient Name: Chyna Lorenzo  MRN: [id number]  Today's Date: 6/27/2024  Time Calculation  Start Time: 0959  Stop Time: 1045  Time Calculation (min): 46 min     611/611-A    Assessment/Plan   PT Assessment  PT Assessment Results: Decreased mobility, Decreased range of motion, Decreased strength, Decreased endurance, Pain  Rehab Prognosis: Good  Evaluation/Treatment Tolerance: Patient tolerated treatment well  Medical Staff Made Aware: Yes  End of Session Communication: Bedside nurse  Assessment Comment: pt would benefit from continued therapy to improve functional mobility and endurance  End of Session Patient Position: Up in chair, Alarm on    PT Plan  Inpatient/Swing Bed or Outpatient: Inpatient  Treatment/Interventions: Bed mobility, Transfer training, Gait training, Range of motion, Therapeutic exercise, Therapeutic activity, Home exercise program  PT Plan: Ongoing PT  PT Frequency: BID  PT Discharge Recommendations: Moderate intensity level of continued care, High intensity level of continued care  PT Recommended Transfer Status: Assist x1, Assistive device    General Visit Information:   PT  Visit  PT Received On: 06/27/24  General  Reason for Referral: Bilatral TKR  Family/Caregiver Present: No  Prior to Session Communication: Bedside nurse (cleared by nursing to participate)  Patient Position Received: Bed, 2 rail up  General Comment: agreeable to participate with encouragement , pt c/o pain , nursing aware , pt has been medicated      Subjective     Precautions:  Precautions  LE Weight Bearing Status: Weight Bearing as Tolerated (B LE's)  Medical Precautions: Fall precautions  Precautions Comment: no KI secondary to good quad control    Vital Signs:     Objective     Pain:  Pain Assessment  Pain Assessment: 0-10  0-10 (Numeric) Pain Score: 8  Pain Type: Surgical pain, Acute pain  Pain Location: Knee  Pain Orientation: Right, Left (states pain in R feels worse  than in L)  Pain Descriptors: Aching, Sore  Pain Interventions: Cold applied    Cognition:  Cognition  Overall Cognitive Status: Within Functional Limits  Orientation Level: Oriented X4      Extremity/Trunk Assessments:        RLE   RLE : Exceptions to WFL  AROM RLE (degrees)  R Knee Flexion 0-130: 75 (measured sitting)  R Knee Extension 0-130: 11 (measured in longsit)  LLE   LLE : Exceptions to WFL  AROM LLE (degrees)  L Knee Flexion 0-130: 75 (measured sitting)  L Knee Extension 0-130: 14 (measured in long sit)        Treatments:  Therapeutic Exercise  Therapeutic Exercise Performed: Yes  Therapeutic Exercise Activity 1: Pt performed supine ther ex including ankle pumps, quad sets, glut sets B/L LEs and heel slides and SAQ B  LE 10-15 reps x1, SLR 5 reps x2. Cues for technique and breathing. (pt needs active assist to initate SLR and SAQ secondary to pain)  Therapeutic Exercise Activity 2: Pt performed seated ther ex including heel/toe raises and hip ABD, LAQ, heel slides, and marches 20 reps x1. Reviewed home exercise program. Educated on goal for 2-3 sets of 20 reps to tolerance. Pt with good understanding.     Bed Mobility  Bed Mobility: Yes  Bed Mobility 1  Bed Mobility 1: Supine to sitting (transfers supine to sit  with Min A to advance LE , head of bed slightly elevated)    Ambulation/Gait Training  Ambulation/Gait Training Performed: Yes  Ambulation/Gait Training 1  Surface 1: Level tile  Device 1: Rolling walker  Comments/Distance (ft) 1: ambulating 15ft x 2 with WW and Min A using slow step to gait pattern with forward flexed posture    Transfers  Transfer: Yes  Transfer 1  Technique 1: Sit to stand, Stand to sit  Trials/Comments 1: transfers sit<--> stand with WW and CGA  with verbal cues for technique and hand placement (transfer to and from commode with raised seat with Vanessa and use of grab bar , tolerated static stance with WW and CGA while performing hand hygeine)    Stairs  Stairs: No       Outcome  Measures:  [facility] Basic Mobility  Turning from your back to your side while in a flat bed without using bedrails: A little  Moving from lying on your back to sitting on the side of a flat bed without using bedrails: A little  Moving to and from bed to chair (including a wheelchair): A little  Standing up from a chair using your arms (e.g. wheelchair or bedside chair): A lot  To walk in hospital room: A lot  Climbing 3-5 steps with railing: Total  Basic Mobility - Total Score: 14      EDUCATION:  Individual(s) Educated: Patient  Education Provided: Body Mechanics, Fall Risk, Home Exercise Program  Patient Response to Education: Patient/Caregiver Verbalized Understanding of Information    Encounter Problems       Encounter Problems (Resolved)       Impaired mobility s/p B/L TKR        Perform all bed mobility with mod. indep. (Adequate for Discharge)       Start:  06/25/24    Expected End:  07/09/24    Resolved:  06/27/24         Perform all transfers with ww and mod. indep.  (Adequate for Discharge)       Start:  06/25/24    Expected End:  07/09/24    Resolved:  06/27/24         Patient will ambulate >/= 75 ft with ww and mod. indep. (Adequate for Discharge)       Start:  06/25/24    Expected End:  07/09/24    Resolved:  06/27/24         Patient will ascend/descend 2 steps with HR and SPC with minAx1 (Adequate for Discharge)       Start:  06/25/24    Expected End:  07/09/24    Resolved:  06/27/24         Patient will perform BLE HEP with indep. (Adequate for Discharge)       Start:  06/25/24    Expected End:  07/09/24    Resolved:  06/27/24         Patient will achieve 0-90 degrees B/L knee ROM  (Adequate for Discharge)       Start:  06/25/24    Expected End:  07/09/24    Resolved:  06/27/24

## 2024-06-27 NOTE — DISCHARGE SUMMARY
Discharge summary  This patient Chyna Lorenzo was admitted to the hospital on 6/25/2024  after undergoing Procedure(s) (LRB):  BILATERAL TOTAL KNEE ARTHROPLASTY (Bilateral) without complications that morning.    During the postoperative period,while in hospital, patient was medically managed by the hospitalist. Please see medial notes and H&P for patients additional diagnoses.  Ortho agrees with all medical diagnoses and treatments while patient in hospital.  No significant or unexpected findings or abnormal ortho imaging were noted during the hospital stay    Hospital course      Patient tolerated surgical procedure well and there was no complications. Patient progressed adequately through their recovery during hospital stay including PT and rehabilitation.    Patient was then D/C on  to skilled nursing facility  in stable condition.  Patient was instructed on the use of pain medications, the signs and symptoms of infection, and was given our number to call should they have any questions or concerns following discharge.    Based on my clinical judgment, the patient was provided with a 7-day prescription for opioid medication at 30 MED, indicated for treatment of acute pain in the setting of recent surgery. OARRS report was run and has demonstrated an appropriate time course.  The patient has been provided with counseling pertaining to safe use of opioid medication.      Patient may WBAT to operative extremity with use of walker for assistance with ambulation   Aquacel dressing to be removed pod7 and incision left open to air  Xarelto 10 mg daily for DVT prophylaxis started on 6/26/24 and to be taken for 28 days  Follow up with surgeon in 2 weeks

## 2024-06-27 NOTE — PROGRESS NOTES
Occupational Therapy    OT Treatment    Patient Name: Chyna Lorenzo  MRN: [id number]  Today's Date: 6/27/2024  Time Calculation  Start Time: 0810  Stop Time: 0827  Time Calculation (min): 17 min        Assessment:  End of Session Communication: Bedside nurse  End of Session Patient Position: Up in chair, Alarm off, not on at start of session         Subjective   Previous Visit Info:  OT Last Visit  OT Received On: 06/27/24  General:  General  Prior to Session Communication: Bedside nurse  Patient Position Received: Up in chair, Alarm on  General Comment: pt agreeable to OT tx, pt stating she has increased pain in B knees, RN aware  Precautions:  LE Weight Bearing Status: Weight Bearing as Tolerated (BLE)  Medical Precautions: Fall precautions       Pain:  Pain Assessment  Pain Assessment: 0-10  0-10 (Numeric) Pain Score: 7  Montejo-Baker FACES Pain Rating:  (B knees)    Objective    Cognition:  Cognition  Overall Cognitive Status: Within Functional Limits  Orientation Level: Oriented X4       Activities of Daily Living: LE Dressing  LE Dressing:  (pt seated threading pants over BLE with increased time and required SBA, pt standing with FWW required min A for clothing mgmt over B hips, VC required for alternating hand placement on FWW)  Functional Standing Tolerance:  Functional Standing Tolerance Comments: pt demos F supported standing balance throughout functional activity  Bed Mobility/Transfers: Transfers  Transfer:  (pt required mod A for functional transfers with use of FWW, max VC required for safe hand placement)      Outcome Measures:[facility] Daily Activity  Putting on and taking off regular lower body clothing: A lot  Bathing (including washing, rinsing, drying): A lot  Putting on and taking off regular upper body clothing: A lot  Toileting, which includes using toilet, bedpan or urinal: A lot  Taking care of personal grooming such as brushing teeth: A little  Eating Meals: None  Daily Activity - Total Score:  15        Education Documentation  ADL Training, taught by JODI Champion at 6/27/2024  1:02 PM.  Learner: Patient  Readiness: Acceptance  Method: Explanation  Response: Needs Reinforcement  Comment: pt educated on OT POC and EC techs    Education Comments  No comments found.        OP EDUCATION:       Goals:  Encounter Problems       Encounter Problems (Active)       OT Goals       Patient will complete household distance functional mobility with a FWW at mod I level.  (Progressing)       Start:  06/26/24    Expected End:  07/10/24            Patient will complete functional transfers with a FWW at mod I level.  (Progressing)       Start:  06/26/24    Expected End:  07/10/24            Patient will complete lower body dressing at a mod I level.  (Progressing)       Start:  06/26/24    Expected End:  07/10/24            Patient will demonstrate fair + dynamic standing balance during functional tasks.  (Progressing)       Start:  06/26/24    Expected End:  07/10/24            Patient will tolerate standing greater than 5 minutes during functional tasks. (Progressing)       Start:  06/26/24    Expected End:  07/10/24

## 2024-06-27 NOTE — CARE PLAN
Problem: Fall/Injury  Goal: Not fall by end of shift  Outcome: Progressing  Goal: Be free from injury by end of the shift  Outcome: Progressing  Goal: Verbalize understanding of personal risk factors for fall in the hospital  Outcome: Progressing  Goal: Verbalize understanding of risk factor reduction measures to prevent injury from fall in the home  Outcome: Progressing  Goal: Use assistive devices by end of the shift  Outcome: Progressing  Goal: Pace activities to prevent fatigue by end of the shift  Outcome: Progressing     Problem: Pain  Goal: Takes deep breaths with improved pain control throughout the shift  Outcome: Progressing  Goal: Turns in bed with improved pain control throughout the shift  Outcome: Progressing  Goal: Walks with improved pain control throughout the shift  Outcome: Progressing  Goal: Performs ADL's with improved pain control throughout shift  Outcome: Progressing  Goal: Participates in PT with improved pain control throughout the shift  Outcome: Progressing  Goal: Free from opioid side effects throughout the shift  Outcome: Progressing  Goal: Free from acute confusion related to pain meds throughout the shift  Outcome: Progressing   The patient's goals for the shift include      The clinical goals for the shift include Pain management

## 2024-07-01 ENCOUNTER — DOCUMENTATION (OUTPATIENT)
Dept: PRIMARY CARE | Facility: CLINIC | Age: 54
End: 2024-07-01
Payer: COMMERCIAL

## 2024-07-02 ENCOUNTER — HOSPITAL ENCOUNTER (OUTPATIENT)
Dept: CARDIOLOGY | Facility: HOSPITAL | Age: 54
Discharge: HOME | End: 2024-07-02
Payer: COMMERCIAL

## 2024-07-02 ENCOUNTER — APPOINTMENT (OUTPATIENT)
Dept: CARDIOLOGY | Facility: HOSPITAL | Age: 54
End: 2024-07-02
Payer: COMMERCIAL

## 2024-07-02 ENCOUNTER — APPOINTMENT (OUTPATIENT)
Dept: RADIOLOGY | Facility: HOSPITAL | Age: 54
End: 2024-07-02
Payer: COMMERCIAL

## 2024-07-02 ENCOUNTER — HOSPITAL ENCOUNTER (EMERGENCY)
Facility: HOSPITAL | Age: 54
Discharge: HOME | End: 2024-07-02
Attending: EMERGENCY MEDICINE
Payer: COMMERCIAL

## 2024-07-02 VITALS
HEIGHT: 66 IN | HEART RATE: 92 BPM | BODY MASS INDEX: 28.93 KG/M2 | DIASTOLIC BLOOD PRESSURE: 74 MMHG | SYSTOLIC BLOOD PRESSURE: 144 MMHG | WEIGHT: 180 LBS | TEMPERATURE: 97.2 F | RESPIRATION RATE: 16 BRPM | OXYGEN SATURATION: 100 %

## 2024-07-02 DIAGNOSIS — M79.605 PAIN IN LEFT LEG: ICD-10-CM

## 2024-07-02 DIAGNOSIS — M79.604 PAIN IN RIGHT LEG: ICD-10-CM

## 2024-07-02 DIAGNOSIS — R79.89 D-DIMER, ELEVATED: Primary | ICD-10-CM

## 2024-07-02 LAB
ALBUMIN SERPL BCP-MCNC: 3.7 G/DL (ref 3.4–5)
ALP SERPL-CCNC: 33 U/L (ref 33–110)
ALT SERPL W P-5'-P-CCNC: 15 U/L (ref 7–45)
ANION GAP SERPL CALC-SCNC: 10 MMOL/L (ref 10–20)
AST SERPL W P-5'-P-CCNC: 20 U/L (ref 9–39)
BASOPHILS # BLD AUTO: 0.03 X10*3/UL (ref 0–0.1)
BASOPHILS NFR BLD AUTO: 0.6 %
BILIRUB SERPL-MCNC: 1 MG/DL (ref 0–1.2)
BODY SURFACE AREA: 1.95 M2
BUN SERPL-MCNC: 17 MG/DL (ref 6–23)
CALCIUM SERPL-MCNC: 9.2 MG/DL (ref 8.6–10.3)
CARDIAC TROPONIN I PNL SERPL HS: 3 NG/L (ref 0–13)
CARDIAC TROPONIN I PNL SERPL HS: 3 NG/L (ref 0–13)
CHLORIDE SERPL-SCNC: 99 MMOL/L (ref 98–107)
CO2 SERPL-SCNC: 32 MMOL/L (ref 21–32)
CREAT SERPL-MCNC: 0.73 MG/DL (ref 0.5–1.05)
EGFRCR SERPLBLD CKD-EPI 2021: >90 ML/MIN/1.73M*2
EOSINOPHIL # BLD AUTO: 0.07 X10*3/UL (ref 0–0.7)
EOSINOPHIL NFR BLD AUTO: 1.5 %
ERYTHROCYTE [DISTWIDTH] IN BLOOD BY AUTOMATED COUNT: 12.5 % (ref 11.5–14.5)
GLUCOSE SERPL-MCNC: 99 MG/DL (ref 74–99)
HCT VFR BLD AUTO: 30.4 % (ref 36–46)
HGB BLD-MCNC: 10.1 G/DL (ref 12–16)
IMM GRANULOCYTES # BLD AUTO: 0.04 X10*3/UL (ref 0–0.7)
IMM GRANULOCYTES NFR BLD AUTO: 0.8 % (ref 0–0.9)
LYMPHOCYTES # BLD AUTO: 1.11 X10*3/UL (ref 1.2–4.8)
LYMPHOCYTES NFR BLD AUTO: 23.5 %
MAGNESIUM SERPL-MCNC: 2.22 MG/DL (ref 1.6–2.4)
MCH RBC QN AUTO: 30.8 PG (ref 26–34)
MCHC RBC AUTO-ENTMCNC: 33.2 G/DL (ref 32–36)
MCV RBC AUTO: 93 FL (ref 80–100)
MONOCYTES # BLD AUTO: 0.57 X10*3/UL (ref 0.1–1)
MONOCYTES NFR BLD AUTO: 12.1 %
NEUTROPHILS # BLD AUTO: 2.9 X10*3/UL (ref 1.2–7.7)
NEUTROPHILS NFR BLD AUTO: 61.5 %
NRBC BLD-RTO: 0 /100 WBCS (ref 0–0)
PLATELET # BLD AUTO: 331 X10*3/UL (ref 150–450)
POTASSIUM SERPL-SCNC: 3.8 MMOL/L (ref 3.5–5.3)
PROT SERPL-MCNC: 6.6 G/DL (ref 6.4–8.2)
RBC # BLD AUTO: 3.28 X10*6/UL (ref 4–5.2)
SODIUM SERPL-SCNC: 137 MMOL/L (ref 136–145)
WBC # BLD AUTO: 4.7 X10*3/UL (ref 4.4–11.3)

## 2024-07-02 PROCEDURE — 93970 EXTREMITY STUDY: CPT

## 2024-07-02 PROCEDURE — 71045 X-RAY EXAM CHEST 1 VIEW: CPT | Mod: COMPUTED RADIOGRAPHY X-RAY | Performed by: RADIOLOGY

## 2024-07-02 PROCEDURE — 99285 EMERGENCY DEPT VISIT HI MDM: CPT | Mod: 25

## 2024-07-02 PROCEDURE — 93005 ELECTROCARDIOGRAM TRACING: CPT

## 2024-07-02 PROCEDURE — 71275 CT ANGIOGRAPHY CHEST: CPT

## 2024-07-02 PROCEDURE — 36415 COLL VENOUS BLD VENIPUNCTURE: CPT

## 2024-07-02 PROCEDURE — 80053 COMPREHEN METABOLIC PANEL: CPT

## 2024-07-02 PROCEDURE — 84484 ASSAY OF TROPONIN QUANT: CPT

## 2024-07-02 PROCEDURE — 71275 CT ANGIOGRAPHY CHEST: CPT | Performed by: STUDENT IN AN ORGANIZED HEALTH CARE EDUCATION/TRAINING PROGRAM

## 2024-07-02 PROCEDURE — 83735 ASSAY OF MAGNESIUM: CPT

## 2024-07-02 PROCEDURE — 2500000001 HC RX 250 WO HCPCS SELF ADMINISTERED DRUGS (ALT 637 FOR MEDICARE OP)

## 2024-07-02 PROCEDURE — 2550000001 HC RX 255 CONTRASTS: Performed by: EMERGENCY MEDICINE

## 2024-07-02 PROCEDURE — 99285 EMERGENCY DEPT VISIT HI MDM: CPT | Performed by: EMERGENCY MEDICINE

## 2024-07-02 PROCEDURE — 71045 X-RAY EXAM CHEST 1 VIEW: CPT | Mod: FY

## 2024-07-02 PROCEDURE — 85025 COMPLETE CBC W/AUTO DIFF WBC: CPT

## 2024-07-02 RX ORDER — OXYCODONE HYDROCHLORIDE 5 MG/1
10 TABLET ORAL ONCE
Status: COMPLETED | OUTPATIENT
Start: 2024-07-02 | End: 2024-07-02

## 2024-07-02 RX ORDER — OXYCODONE HYDROCHLORIDE 5 MG/1
5 TABLET ORAL ONCE
Status: DISCONTINUED | OUTPATIENT
Start: 2024-07-02 | End: 2024-07-02

## 2024-07-02 ASSESSMENT — PAIN DESCRIPTION - ORIENTATION
ORIENTATION: RIGHT;LEFT
ORIENTATION: RIGHT;LEFT

## 2024-07-02 ASSESSMENT — LIFESTYLE VARIABLES
HAVE YOU EVER FELT YOU SHOULD CUT DOWN ON YOUR DRINKING: NO
EVER HAD A DRINK FIRST THING IN THE MORNING TO STEADY YOUR NERVES TO GET RID OF A HANGOVER: NO
HAVE PEOPLE ANNOYED YOU BY CRITICIZING YOUR DRINKING: NO
EVER FELT BAD OR GUILTY ABOUT YOUR DRINKING: NO
TOTAL SCORE: 0

## 2024-07-02 ASSESSMENT — PAIN SCALES - GENERAL
PAINLEVEL_OUTOF10: 6
PAINLEVEL_OUTOF10: 0 - NO PAIN
PAINLEVEL_OUTOF10: 5 - MODERATE PAIN
PAINLEVEL_OUTOF10: 5 - MODERATE PAIN
PAINLEVEL_OUTOF10: 7

## 2024-07-02 ASSESSMENT — PAIN DESCRIPTION - PAIN TYPE
TYPE: SURGICAL PAIN
TYPE: SURGICAL PAIN

## 2024-07-02 ASSESSMENT — PAIN - FUNCTIONAL ASSESSMENT
PAIN_FUNCTIONAL_ASSESSMENT: 0-10
PAIN_FUNCTIONAL_ASSESSMENT: 0-10

## 2024-07-02 ASSESSMENT — PAIN DESCRIPTION - LOCATION
LOCATION: KNEE
LOCATION: KNEE
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
## 2024-07-03 ENCOUNTER — HOME HEALTH ADMISSION (OUTPATIENT)
Dept: HOME HEALTH SERVICES | Facility: HOME HEALTH | Age: 54
End: 2024-07-03
Payer: COMMERCIAL

## 2024-07-03 ENCOUNTER — DOCUMENTATION (OUTPATIENT)
Dept: HOME HEALTH SERVICES | Facility: HOME HEALTH | Age: 54
End: 2024-07-03
Payer: COMMERCIAL

## 2024-07-03 LAB
ATRIAL RATE: 97 BPM
P AXIS: 31 DEGREES
P OFFSET: 198 MS
P ONSET: 145 MS
PR INTERVAL: 158 MS
Q ONSET: 224 MS
QRS COUNT: 16 BEATS
QRS DURATION: 70 MS
QT INTERVAL: 364 MS
QTC CALCULATION(BAZETT): 462 MS
QTC FREDERICIA: 427 MS
R AXIS: 5 DEGREES
T AXIS: 42 DEGREES
T OFFSET: 406 MS
VENTRICULAR RATE: 97 BPM

## 2024-07-03 NOTE — HH CARE COORDINATION
Home Care received a Referral for Nursing, Physical Therapy, and Home Health Aide. We have processed the referral for a Start of Care on 07-06-24, 24-48 hours.     If you have any questions or concerns, please feel free to contact us at [phone]. Follow the prompts, enter your five digit zip code, and you will be directed to your care team on WE[facility] 2.

## 2024-07-06 ENCOUNTER — HOME CARE VISIT (OUTPATIENT)
Dept: HOME HEALTH SERVICES | Facility: HOME HEALTH | Age: 54
End: 2024-07-06
Payer: COMMERCIAL

## 2024-07-06 VITALS
HEART RATE: 80 BPM | TEMPERATURE: 96.5 F | DIASTOLIC BLOOD PRESSURE: 62 MMHG | OXYGEN SATURATION: 99 % | RESPIRATION RATE: 20 BRPM | SYSTOLIC BLOOD PRESSURE: 112 MMHG

## 2024-07-06 PROCEDURE — G0299 HHS/HOSPICE OF RN EA 15 MIN: HCPCS

## 2024-07-06 ASSESSMENT — PAIN SCALES - PAIN ASSESSMENT IN ADVANCED DEMENTIA (PAINAD)
FACIALEXPRESSION: 0
CONSOLABILITY: 0 - NO NEED TO CONSOLE.
NEGVOCALIZATION: 0 - NONE.
FACIALEXPRESSION: 0 - SMILING OR INEXPRESSIVE.
BODYLANGUAGE: 0 - RELAXED.
TOTALSCORE: 0
BREATHING: 0
BODYLANGUAGE: 0
NEGVOCALIZATION: 0
CONSOLABILITY: 0

## 2024-07-06 ASSESSMENT — ACTIVITIES OF DAILY LIVING (ADL)
OASIS_M1830: 06
ENTERING_EXITING_HOME: MODERATE ASSIST

## 2024-07-06 ASSESSMENT — ENCOUNTER SYMPTOMS
PAIN SEVERITY GOAL: 0/10
APPETITE LEVEL: FAIR
PAIN LOCATION: LEFT KNEE
HIGHEST PAIN SEVERITY IN PAST 24 HOURS: 2/10
LAST BOWEL MOVEMENT: 67027
CHANGE IN APPETITE: UNCHANGED
PAIN LOCATION - PAIN QUALITY: SORE
PAIN LOCATION - PAIN SEVERITY: 2/10
LOWEST PAIN SEVERITY IN PAST 24 HOURS: 0/10
PAIN: 1
OCCASIONAL FEELINGS OF UNSTEADINESS: 0
PERSON REPORTING PAIN: PATIENT
DEPRESSION: 0
LOSS OF SENSATION IN FEET: 0
SUBJECTIVE PAIN PROGRESSION: UNCHANGED
PAIN LOCATION - PAIN FREQUENCY: INTERMITTENT

## 2024-07-07 NOTE — HOME HEALTH
SKILLED NURSING VISIT FOR ADMISSION TO HOMECARE SERVICES ASSESSMENT TEACHING OF MEDICATIONS DISEASE PROCESS BOWEL AND PAIN MANAGEMENT INCLUDING ICE FOR COMFORT. PT TOLERATED PROCEDURES WELL. PT HAS BILATERAL KNEE INCISIONS WITHOUT REDNESS EDEMA OR DRAINAGE. JODI. PT DECLINES FURTHER SN SERVICES AT THIS TIME. PT ACCEPTS PT AND OT SERVICES. PT DECLINES HOMECARE SERVICES AT THIS TIME.

## 2024-07-08 ENCOUNTER — HOME CARE VISIT (OUTPATIENT)
Dept: HOME HEALTH SERVICES | Facility: HOME HEALTH | Age: 54
End: 2024-07-08
Payer: COMMERCIAL

## 2024-07-08 PROCEDURE — G0151 HHCP-SERV OF PT,EA 15 MIN: HCPCS

## 2024-07-08 PROCEDURE — G0152 HHCP-SERV OF OT,EA 15 MIN: HCPCS

## 2024-07-08 SDOH — HEALTH STABILITY: PHYSICAL HEALTH: EXERCISE TYPE: BLE PER HEP

## 2024-07-08 SDOH — ECONOMIC STABILITY: HOUSING INSECURITY: HOME SAFETY: UATE LIGHTING.

## 2024-07-08 SDOH — ECONOMIC STABILITY: HOUSING INSECURITY
HOME SAFETY: PT LIVES AT HOME WITH SPOUSE. WALK IN SHOWER WITH GRAB BARS AND SHOWER CHAIR. HIGHER TOILET SEAT IN MASTER BATH. LAUNDRY ON FIRST FLOOR. PT REPORTS NO REASON TO USE BASEMENT STAIRS. USES 2 STEPS TO ENTER HOUSE FROM THE GARAGE. CLEAR WALKWAYS AND ADEQ

## 2024-07-08 ASSESSMENT — ACTIVITIES OF DAILY LIVING (ADL)
AMBULATION ASSISTANCE: STAND BY ASSIST
TOILETING: 1
AMBULATION_DISTANCE/DURATION_TOLERATED: 50'
PHYSICAL TRANSFERS ASSESSED: 1
AMBULATION ASSISTANCE ON FLAT SURFACES: 1
AMBULATION ASSISTANCE: 1
CURRENT_FUNCTION: STAND BY ASSIST
PHYSICAL TRANSFERS ASSESSED: 1
TOILETING: INDEPENDENT
CURRENT_FUNCTION: INDEPENDENT

## 2024-07-08 ASSESSMENT — ENCOUNTER SYMPTOMS
PAIN: 1
PAIN SEVERITY GOAL: 0/10
PERSON REPORTING PAIN: PATIENT
PERSON REPORTING PAIN: PATIENT
PAIN LOCATION: RIGHT KNEE
HIGHEST PAIN SEVERITY IN PAST 24 HOURS: 5/10
LOWEST PAIN SEVERITY IN PAST 24 HOURS: 4/10
PAIN LOCATION - PAIN SEVERITY: 4/10
MUSCLE WEAKNESS: 1
PAIN LOCATION: LEFT KNEE
PAIN LOCATION: RIGHT KNEE
LIMITED RANGE OF MOTION: 1
PAIN: 1
PAIN LOCATION - PAIN SEVERITY: 4/10
PAIN LOCATION: LEFT KNEE

## 2024-07-11 ENCOUNTER — HOSPITAL ENCOUNTER (OUTPATIENT)
Dept: RADIOLOGY | Facility: CLINIC | Age: 54
Discharge: HOME | End: 2024-07-11
Payer: COMMERCIAL

## 2024-07-11 ENCOUNTER — OFFICE VISIT (OUTPATIENT)
Dept: ORTHOPEDIC SURGERY | Facility: CLINIC | Age: 54
End: 2024-07-11
Payer: COMMERCIAL

## 2024-07-11 DIAGNOSIS — M62.838 MUSCLE SPASM: ICD-10-CM

## 2024-07-11 DIAGNOSIS — M17.0 PRIMARY OSTEOARTHRITIS OF BOTH KNEES: ICD-10-CM

## 2024-07-11 DIAGNOSIS — G89.18 ACUTE POSTOPERATIVE PAIN: Primary | ICD-10-CM

## 2024-07-11 DIAGNOSIS — Z96.653 HISTORY OF BILATERAL KNEE ARTHROPLASTY: ICD-10-CM

## 2024-07-11 PROCEDURE — 99024 POSTOP FOLLOW-UP VISIT: CPT | Performed by: PHYSICIAN ASSISTANT

## 2024-07-11 PROCEDURE — 73560 X-RAY EXAM OF KNEE 1 OR 2: CPT | Mod: BILATERAL PROCEDURE | Performed by: ORTHOPAEDIC SURGERY

## 2024-07-11 PROCEDURE — 73560 X-RAY EXAM OF KNEE 1 OR 2: CPT | Mod: 50

## 2024-07-11 PROCEDURE — 3008F BODY MASS INDEX DOCD: CPT | Performed by: PHYSICIAN ASSISTANT

## 2024-07-11 PROCEDURE — 1036F TOBACCO NON-USER: CPT | Performed by: PHYSICIAN ASSISTANT

## 2024-07-11 PROCEDURE — 99211 OFF/OP EST MAY X REQ PHY/QHP: CPT | Performed by: PHYSICIAN ASSISTANT

## 2024-07-11 RX ORDER — HYDROCODONE BITARTRATE AND ACETAMINOPHEN 5; 325 MG/1; MG/1
1 TABLET ORAL EVERY 6 HOURS PRN
Qty: 28 TABLET | Refills: 0 | Status: SHIPPED | OUTPATIENT
Start: 2024-07-11 | End: 2024-07-18

## 2024-07-11 RX ORDER — CYCLOBENZAPRINE HCL 10 MG
10 TABLET ORAL 3 TIMES DAILY PRN
Qty: 30 TABLET | Refills: 0 | Status: SHIPPED | OUTPATIENT
Start: 2024-07-11 | End: 2024-07-21

## 2024-07-11 NOTE — PROGRESS NOTES
History of present illness patient is 2 weeks status post bilateral total knee replacements.  She is ambulating with just a cane.  Lot of night pain but overall doing well during the day.      Physical exam:      General: No acute distress or breathing difficulty or discomfort, pleasant and cooperative with the examination.    Extremities: Right knee incisions clean dry and intact 1+ effusion no evidence of infection she does have approximately 2 mm portion of Monocryl stitch at the apex of the incision however no redness or drainage around this area good straight leg raise with range of motion from 4 degrees to 95 degrees  Left knee incisions clean dry and intact 1+ effusion no drainage or redness current range of motion is 2 degrees to 95 degrees      Diagnostic studies: X-rays 2 views bilateral knees read by Dr. Garrett Nru    Impression: Status post bilateral total knee replacements    Plan: Patient will continue home physical therapy and then transition to aquatic physical therapy at [facility].  New physical therapy order was provided.  Refill of pain medication and muscle relaxer were requested.  She will follow-up with us in 1 month with x-rays 2 views bilateral knees and wound range of motion checks.

## 2024-07-12 ENCOUNTER — HOME CARE VISIT (OUTPATIENT)
Dept: HOME HEALTH SERVICES | Facility: HOME HEALTH | Age: 54
End: 2024-07-12
Payer: COMMERCIAL

## 2024-07-12 ENCOUNTER — TELEPHONE (OUTPATIENT)
Dept: ORTHOPEDIC SURGERY | Facility: CLINIC | Age: 54
End: 2024-07-12
Payer: COMMERCIAL

## 2024-07-12 DIAGNOSIS — M17.0 PRIMARY OSTEOARTHRITIS OF BOTH KNEES: ICD-10-CM

## 2024-07-12 DIAGNOSIS — Z96.653 HISTORY OF BILATERAL KNEE ARTHROPLASTY: ICD-10-CM

## 2024-07-12 PROCEDURE — G0151 HHCP-SERV OF PT,EA 15 MIN: HCPCS

## 2024-07-12 RX ORDER — OXYCODONE AND ACETAMINOPHEN 5; 325 MG/1; MG/1
1 TABLET ORAL EVERY 6 HOURS PRN
Qty: 28 TABLET | Refills: 0 | Status: SHIPPED | OUTPATIENT
Start: 2024-07-12 | End: 2024-07-19

## 2024-07-12 ASSESSMENT — ENCOUNTER SYMPTOMS
LOWEST PAIN SEVERITY IN PAST 24 HOURS: 3/10
PAIN: 1
PAIN LOCATION: LEFT KNEE
PERSON REPORTING PAIN: PATIENT
HIGHEST PAIN SEVERITY IN PAST 24 HOURS: 5/10
PAIN LOCATION: RIGHT KNEE
PAIN SEVERITY GOAL: 0/10

## 2024-07-12 NOTE — TELEPHONE ENCOUNTER
Patient called wanting to make sure the different pain medication that she requested was sent to her pharmacy, I looked in her chart & Dr. Nur did approve it, so I advised her to call her pharmacy to see when they would be available to her.

## 2024-07-13 SDOH — HEALTH STABILITY: PHYSICAL HEALTH: EXERCISE TYPE: BLE PER HEP

## 2024-07-13 ASSESSMENT — ACTIVITIES OF DAILY LIVING (ADL)
AMBULATION ASSISTANCE: SUPERVISION
CURRENT_FUNCTION: SUPERVISION
AMBULATION ASSISTANCE: 1
PHYSICAL TRANSFERS ASSESSED: 1

## 2024-07-13 ASSESSMENT — ENCOUNTER SYMPTOMS
LIMITED RANGE OF MOTION: 1
MUSCLE WEAKNESS: 1

## 2024-07-16 ENCOUNTER — HOME CARE VISIT (OUTPATIENT)
Dept: HOME HEALTH SERVICES | Facility: HOME HEALTH | Age: 54
End: 2024-07-16
Payer: COMMERCIAL

## 2024-07-16 PROCEDURE — G0151 HHCP-SERV OF PT,EA 15 MIN: HCPCS

## 2024-07-16 SDOH — HEALTH STABILITY: PHYSICAL HEALTH: EXERCISE TYPE: BLE PER HEP

## 2024-07-16 ASSESSMENT — ENCOUNTER SYMPTOMS
MUSCLE WEAKNESS: 1
HIGHEST PAIN SEVERITY IN PAST 24 HOURS: 6/10
LIMITED RANGE OF MOTION: 1
PAIN: 1
PAIN SEVERITY GOAL: 0/10
PAIN LOCATION: RIGHT KNEE
LOWEST PAIN SEVERITY IN PAST 24 HOURS: 5/10
PAIN LOCATION: LEFT KNEE
PERSON REPORTING PAIN: PATIENT

## 2024-07-16 ASSESSMENT — ACTIVITIES OF DAILY LIVING (ADL)
AMBULATION ASSISTANCE: SUPERVISION
AMBULATION ASSISTANCE: 1
PHYSICAL TRANSFERS ASSESSED: 1
CURRENT_FUNCTION: SUPERVISION

## 2024-07-18 ENCOUNTER — APPOINTMENT (OUTPATIENT)
Dept: PHYSICAL THERAPY | Facility: CLINIC | Age: 54
End: 2024-07-18
Payer: COMMERCIAL

## 2024-07-19 ENCOUNTER — HOME CARE VISIT (OUTPATIENT)
Dept: HOME HEALTH SERVICES | Facility: HOME HEALTH | Age: 54
End: 2024-07-19
Payer: COMMERCIAL

## 2024-07-19 ENCOUNTER — TELEPHONE (OUTPATIENT)
Dept: ORTHOPEDIC SURGERY | Facility: CLINIC | Age: 54
End: 2024-07-19
Payer: COMMERCIAL

## 2024-07-19 PROCEDURE — G0151 HHCP-SERV OF PT,EA 15 MIN: HCPCS

## 2024-07-19 SDOH — HEALTH STABILITY: PHYSICAL HEALTH: EXERCISE TYPE: BLE PER HEP

## 2024-07-19 ASSESSMENT — ENCOUNTER SYMPTOMS
HIGHEST PAIN SEVERITY IN PAST 24 HOURS: 7/10
LOWEST PAIN SEVERITY IN PAST 24 HOURS: 4/10
PAIN SEVERITY GOAL: 0/10
PAIN: 1

## 2024-07-19 ASSESSMENT — ACTIVITIES OF DAILY LIVING (ADL)
HOME_HEALTH_OASIS: 00
OASIS_M1830: 00
AMBULATION ASSISTANCE: INDEPENDENT
AMBULATION_DISTANCE/DURATION_TOLERATED: 200'
AMBULATION ASSISTANCE ON FLAT SURFACES: 1
AMBULATION ASSISTANCE: 1
PHYSICAL TRANSFERS ASSESSED: 1
CURRENT_FUNCTION: INDEPENDENT

## 2024-07-19 NOTE — TELEPHONE ENCOUNTER
Patient called requesting a refill on her pain medication. She is S/P B/L TKA 06/25/24. Pharmacy correct.

## 2024-07-20 RX ORDER — OXYCODONE AND ACETAMINOPHEN 5; 325 MG/1; MG/1
1 TABLET ORAL EVERY 6 HOURS PRN
Qty: 28 TABLET | Refills: 0 | Status: SHIPPED | OUTPATIENT
Start: 2024-07-20 | End: 2024-07-27

## 2024-07-24 ENCOUNTER — EVALUATION (OUTPATIENT)
Dept: PHYSICAL THERAPY | Facility: CLINIC | Age: 54
End: 2024-07-24
Payer: COMMERCIAL

## 2024-07-24 DIAGNOSIS — M25.569 PAIN, KNEE: Primary | ICD-10-CM

## 2024-07-24 DIAGNOSIS — G89.18 ACUTE POSTOPERATIVE PAIN: ICD-10-CM

## 2024-07-24 PROCEDURE — 97162 PT EVAL MOD COMPLEX 30 MIN: CPT | Mod: GP

## 2024-07-24 PROCEDURE — 97110 THERAPEUTIC EXERCISES: CPT | Mod: GP

## 2024-07-24 ASSESSMENT — ENCOUNTER SYMPTOMS
LOSS OF SENSATION IN FEET: 0
OCCASIONAL FEELINGS OF UNSTEADINESS: 1
DEPRESSION: 0

## 2024-07-24 NOTE — PROGRESS NOTES
Physical Therapy    Physical Therapy Evaluation and Treatment      Patient Name: Chyna Lorenzo  MRN: [id number]  Today's Date: 7/24/2024    Time Entry:   Time Calculation  Start Time: 0803  Stop Time: 0838  Time Calculation (min): 35 min  PT Evaluation Time Entry  PT Evaluation (Moderate) Time Entry: 25  PT Therapeutic Procedures Time Entry  Therapeutic Exercise Time Entry: 10                 Visit #1  20% coins, $500 / $1500 ded ($500 / $500 met), $3500 / $7000 oop ($3500 / $3822.78 met), no copay, 30v per ramiro yr (0v used as of 7/15/2024), PA req thru [facility]     Assessment:    Pt presents s/p bilateral TKA on 6/25/24 with Dr. Nur. Patient demonstrates impairments of bilateral knee pain, weakness and ROM limits. Pt would benefit from land and aquatic PT services to address current deficits and activity limits.  Educated patient on current POC, initial HEP and current examination findings. Patient verbalized understanding of all education and instruction provided today.     Plan:  OP PT Plan  Treatment/Interventions: Aquatic therapy, Cryotherapy, Dry needling, Education/ Instruction, Electrical stimulation, Gait training, Manual therapy, Neuromuscular re-education, Self care/ home management, Taping techniques, Therapeutic activities, Therapeutic exercises, Vasopneumatic device  PT Plan: Skilled PT  PT Frequency: 2 times per week  Duration: 20  Certification Period Start Date: 07/24/24  Certification Period End Date: 10/22/24  Number of Treatments Authorized: 30  Rehab Potential: Good  Plan of Care Agreement: Patient    Current Problem:   1. History of bilateral knee arthroplasty  Referral to Physical Therapy    Follow Up In Physical Therapy    Follow Up In Physical Therapy      2. Acute postoperative pain  Referral to Physical Therapy    Follow Up In Physical Therapy    Follow Up In Physical Therapy          Subjective    General:  Pt presents s/p bilateral TKA on 6/25/24 with Dr. Nur. Pt denies any falls or  bowel/bladder dysfunction since surgery and has completed home PT. Pt ambulating with SP cane to today's appt.      Precautions:  TKA bilateral knees    Pain:  6/10 bilateral knees.     Home Living:  Lives with spouse. 3 steps to enter home. Walk in shower with chair for bathing.       Objective   Knee Musculoskeletal Exam  Gait    Antalgic: right and left    Assistive device: cane  Gait additional comments: Shortened step length and stride length bilaterally.    Inspection    Right      Edema: mild        Incision: clean, dry, intact and mark-incisional numbness      Incisional drainage: none    Left      Edema: mild        Incision: clean, dry, intact and mark-incisional numbness      Incisional drainage: none    Palpation    Right      Tenderness: present          Patella: mild          Patellar tendon: mild          Quadriceps tendon: mild      Left      Tenderness: present          Patella: mild          Patellar tendon: mild          Quadriceps tendon: mild      Range of Motion    Right      Active extension: -5      Passive extension: -5      Active flexion: 105      Passive flexion: 105    Left      Active extension: -5      Passive extension: -5      Active flexion: 100      Passive flexion: 105    Strength    Right      Extension: 3/5. Extension is affected by pain.       Flexion: 3/5. Flexion is affected by pain.     Left      Extension: 3/5. Extension is affected by pain.       Flexion: 3/5. Flexion is affected by pain.          Outcome Measures:  Other Measures  Lower Extremity Funtional Score (LEFS): 27/80     Treatments:  STS x10  LAQ 5 sec hold x10  Standing HSC x10 ea  Heel slides x10 ea  SLR x10 ea  Quad set x10 ea      EDUCATION:  Outpatient Education  Individual(s) Educated: Patient  Education Provided: Anatomy, Body Mechanics, Fall Risk, Home Exercise Program, Physiology, POC, Post-Op Precautions    Goals:  -Land Goals-  Patient will improve bilateral knee strength to >/=4+/5 for improved knee  stability.    Patient will improve bilateral knee AROM to >/=0-120 deg for improved knee mobility.    Patient will be independent with HEP.    Patient will demonstrate reciprocal gait pattern without assistive device.    Patient will demonstrate reciprocal step stair negotiation pattern.    Patient will improve LEFS score to >/=65/80    -Pool Goals-  Patient will be able to perform 45 minutes of aquatic exercise with reported </=0 /10 pain level   Patient will  ambulate up/ down pool ramp without UE assist or decrease UE assist   Patient will display improved gait quality on pool deck demonstrating improved jose carlos /step through pattern   Patient will ascend/ descend 8 inch step in 3 1/2 feet water with good control without UE assist    Patient will demonstrate dynamic lumbar stabilization (DLS) with good control, performing with UE involvement and paddle resistance at level 5. (level 5 is the hardest/ level 1 is the easiest)   Patient will perform 5x sit to stand from bench without the use of upper extremities </= 15 seconds to show improved lower extremity functional strength.  Patient will perform single leg stance (SLS) in 4 foot water depth without the use of upper extremity assist.   Patient will be able to enter/exit pool via pool ramp and will not require assistance of chair lift to participate with aquatic session.  Patient will perform/recall >/= 85% of aquatic program without cueing.

## 2024-08-01 ENCOUNTER — TELEPHONE (OUTPATIENT)
Dept: ORTHOPEDIC SURGERY | Facility: CLINIC | Age: 54
End: 2024-08-01
Payer: COMMERCIAL

## 2024-08-01 ENCOUNTER — APPOINTMENT (OUTPATIENT)
Dept: PHYSICAL THERAPY | Facility: CLINIC | Age: 54
End: 2024-08-01
Payer: COMMERCIAL

## 2024-08-01 DIAGNOSIS — Z96.653 HISTORY OF BILATERAL KNEE ARTHROPLASTY: ICD-10-CM

## 2024-08-01 DIAGNOSIS — M17.0 PRIMARY OSTEOARTHRITIS OF BOTH KNEES: ICD-10-CM

## 2024-08-01 RX ORDER — OXYCODONE AND ACETAMINOPHEN 5; 325 MG/1; MG/1
1 TABLET ORAL EVERY 6 HOURS PRN
Qty: 28 TABLET | Refills: 0 | Status: SHIPPED | OUTPATIENT
Start: 2024-08-01 | End: 2024-08-08

## 2024-08-06 ENCOUNTER — TREATMENT (OUTPATIENT)
Dept: PHYSICAL THERAPY | Facility: CLINIC | Age: 54
End: 2024-08-06
Payer: COMMERCIAL

## 2024-08-06 DIAGNOSIS — G89.18 ACUTE POSTOPERATIVE PAIN: ICD-10-CM

## 2024-08-06 PROCEDURE — 97110 THERAPEUTIC EXERCISES: CPT | Mod: GP

## 2024-08-06 NOTE — PROGRESS NOTES
Physical Therapy    Physical Therapy Treatment    Patient Name: Chyna Lorenzo  MRN: [id number]  Today's Date: 8/6/2024    Time Entry:   Time Calculation  Start Time: 1350  Stop Time: 1430  Time Calculation (min): 40 min     PT Therapeutic Procedures Time Entry  Therapeutic Exercise Time Entry: 40                Visit #2  20% coins, $500 / $1500 ded ($500 / $500 met), $3500 / $7000 oop ($3500 / $3822.78 met), no copay, 30v per ramiro yr (0v used as of 7/15/2024), PA req thru [employer]     Assessment:  Pt did well this session, showing improving bilateral knee mobility and quad contractility. Introduced progressions for hip and knee strengthening which patient was appropriately challenged and fatigued with. Encouraged continued HEP consistency and regular mobility. Patient has visit with Dr. Nur on 8/8/24.     Plan:   OP PT Plan  Treatment/Interventions: Aquatic therapy, Cryotherapy, Dry needling, Education/ Instruction, Electrical stimulation, Gait training, Manual therapy, Neuromuscular re-education, Self care/ home management, Taping techniques, Therapeutic activities, Therapeutic exercises, Vasopneumatic device  PT Plan: Skilled PT  PT Frequency: 2 times per week  Duration: 20  Certification Period Start Date: 07/24/24  Certification Period End Date: 10/22/24  Number of Treatments Authorized: 30  Rehab Potential: Good  Plan of Care Agreement: Patient    Current Problem  1. Acute postoperative pain  Follow Up In Physical Therapy                Subjective    (Bilateral TKA 6/25/24)  Patient reports HEP is going well. Still limited in mobility by knee pain (L>R) and lack of endurance for longer distance walking.      Pain   4/10 bilateral knees    Objective   Range of Motion    Right      Active extension: 0      Passive extension: 0      Active flexion: 105      Passive flexion: 105    Left      Active extension: 0      Passive extension: 0      Active flexion: 100      Passive flexion: 105      Treatments:  Nu-step 8  min Lv 2.   Knee flexion AAROM on 6 in step x20 ea  HS stretch 30 sec x5  Adductor stretch in standing 10 sec x5 ea  Seated LAQ with 2.5# 2x15 ea  Seated HSC with RTB 3x10 ea  Mini-squats at counter x15  Ambulation on track with cane 1/10 mile   HR's 2x20  Standing Hip abd 2x15 ea        OP EDUCATION:   HEP, POC      Goals:    -Land Goals-  Patient will improve bilateral knee strength to >/=4+/5 for improved knee stability.     Patient will improve bilateral knee AROM to >/=0-120 deg for improved knee mobility.     Patient will be independent with HEP.     Patient will demonstrate reciprocal gait pattern without assistive device.     Patient will demonstrate reciprocal step stair negotiation pattern.     Patient will improve LEFS score to >/=65/80     -Pool Goals-  Patient will be able to perform 45 minutes of aquatic exercise with reported </=0 /10 pain level     Patient will  ambulate up/ down pool ramp without UE assist or decrease UE assist     Patient will display improved gait quality on pool deck demonstrating improved jose carlos /step through pattern     Patient will ascend/ descend 8 inch step in 3 1/2 feet water with good control without UE assist     Patient will demonstrate dynamic lumbar stabilization (DLS) with good control, performing with UE involvement and paddle resistance at level 5. (level 5 is the hardest/ level 1 is the easiest)     Patient will perform 5x sit to stand from bench without the use of upper extremities </= 15 seconds to show improved lower extremity functional strength.    Patient will perform single leg stance (SLS) in 4 foot water depth without the use of upper extremity assist.     Patient will be able to enter/exit pool via pool ramp and will not require assistance of chair lift to participate with aquatic session.    Patient will perform/recall >/= 85% of aquatic program without cueing.

## 2024-08-08 ENCOUNTER — OFFICE VISIT (OUTPATIENT)
Dept: ORTHOPEDIC SURGERY | Facility: CLINIC | Age: 54
End: 2024-08-08
Payer: COMMERCIAL

## 2024-08-08 ENCOUNTER — APPOINTMENT (OUTPATIENT)
Dept: PHYSICAL THERAPY | Facility: CLINIC | Age: 54
End: 2024-08-08
Payer: COMMERCIAL

## 2024-08-08 ENCOUNTER — HOSPITAL ENCOUNTER (OUTPATIENT)
Dept: RADIOLOGY | Facility: CLINIC | Age: 54
Discharge: HOME | End: 2024-08-08
Payer: COMMERCIAL

## 2024-08-08 DIAGNOSIS — M17.0 PRIMARY OSTEOARTHRITIS OF BOTH KNEES: ICD-10-CM

## 2024-08-08 DIAGNOSIS — Z96.653 HISTORY OF BILATERAL KNEE ARTHROPLASTY: Primary | ICD-10-CM

## 2024-08-08 PROCEDURE — 99211 OFF/OP EST MAY X REQ PHY/QHP: CPT | Performed by: PHYSICIAN ASSISTANT

## 2024-08-08 PROCEDURE — 73560 X-RAY EXAM OF KNEE 1 OR 2: CPT | Mod: 50

## 2024-08-08 RX ORDER — NAPROXEN 500 MG/1
500 TABLET ORAL 2 TIMES DAILY
Qty: 60 TABLET | Refills: 0 | Status: SHIPPED | OUTPATIENT
Start: 2024-08-08 | End: 2024-09-07

## 2024-08-08 RX ORDER — SYRINGE WITH NEEDLE, 1 ML 25GX5/8"
SYRINGE, EMPTY DISPOSABLE MISCELLANEOUS
COMMUNITY
Start: 2024-05-30

## 2024-08-08 RX ORDER — CYCLOBENZAPRINE HCL 10 MG
10 TABLET ORAL 3 TIMES DAILY PRN
Qty: 30 TABLET | Refills: 0 | Status: SHIPPED | OUTPATIENT
Start: 2024-08-08 | End: 2024-08-18

## 2024-08-08 NOTE — PROGRESS NOTES
History of present illness patient is almost 2 months status post bilateral total knee replacements.  Overall she is doing very well.  She is doing outpatient physical therapy.  Her main complaint is nighttime soreness.      Physical exam:      General: No acute distress or breathing difficulty or discomfort, pleasant and cooperative with the examination.    Extremities: Right knee incisions clean dry and intact current range of motion is 0 to 105 degrees  Left knee incisions clean dry and intact trace effusion current range of motion is 0 to 105 degrees      Diagnostic studies: X-rays 2 views bilateral knees show well aligned with position total knee replacements they were read by Dr. Garrett Nur    Impression: Status post bilateral total knee replacements    Plan: Patient will continue to work on physical therapy.  She is looking at returning to work 3 months postop.  Will see her in approximately 5 weeks with bilateral knee x-rays 2 views and range of motion check

## 2024-08-12 ENCOUNTER — APPOINTMENT (OUTPATIENT)
Dept: PHYSICAL THERAPY | Facility: CLINIC | Age: 54
End: 2024-08-12
Payer: COMMERCIAL

## 2024-08-12 ENCOUNTER — APPOINTMENT (OUTPATIENT)
Dept: PRIMARY CARE | Facility: CLINIC | Age: 54
End: 2024-08-12
Payer: COMMERCIAL

## 2024-08-12 VITALS
WEIGHT: 186 LBS | HEART RATE: 87 BPM | OXYGEN SATURATION: 99 % | DIASTOLIC BLOOD PRESSURE: 83 MMHG | HEIGHT: 66 IN | RESPIRATION RATE: 16 BRPM | TEMPERATURE: 97.7 F | BODY MASS INDEX: 29.89 KG/M2 | SYSTOLIC BLOOD PRESSURE: 138 MMHG

## 2024-08-12 DIAGNOSIS — E55.9 VITAMIN D DEFICIENCY: ICD-10-CM

## 2024-08-12 DIAGNOSIS — M17.0 PRIMARY OSTEOARTHRITIS OF BOTH KNEES: ICD-10-CM

## 2024-08-12 DIAGNOSIS — Z00.00 ROUTINE GENERAL MEDICAL EXAMINATION AT A HEALTH CARE FACILITY: Primary | ICD-10-CM

## 2024-08-12 DIAGNOSIS — R41.3 MEMORY LOSS: ICD-10-CM

## 2024-08-12 DIAGNOSIS — Z12.11 ENCOUNTER FOR SCREENING FOR MALIGNANT NEOPLASM OF COLON: ICD-10-CM

## 2024-08-12 DIAGNOSIS — Z96.653 HISTORY OF BILATERAL KNEE ARTHROPLASTY: ICD-10-CM

## 2024-08-12 DIAGNOSIS — E53.8 B12 DEFICIENCY: ICD-10-CM

## 2024-08-12 PROCEDURE — 1036F TOBACCO NON-USER: CPT | Performed by: FAMILY MEDICINE

## 2024-08-12 PROCEDURE — 3008F BODY MASS INDEX DOCD: CPT | Performed by: FAMILY MEDICINE

## 2024-08-12 PROCEDURE — 99396 PREV VISIT EST AGE 40-64: CPT | Performed by: FAMILY MEDICINE

## 2024-08-12 ASSESSMENT — ENCOUNTER SYMPTOMS
NECK PAIN: 0
CHOKING: 0
COUGH: 0
POLYDIPSIA: 0
FATIGUE: 0
PALPITATIONS: 0
LIGHT-HEADEDNESS: 0
JOINT SWELLING: 0
HEADACHES: 0
ABDOMINAL PAIN: 0
ADENOPATHY: 0
CONFUSION: 0
SHORTNESS OF BREATH: 0
HEMATURIA: 0
DIZZINESS: 0
CHILLS: 0
AGITATION: 0
DIAPHORESIS: 0
VOICE CHANGE: 0
CHEST TIGHTNESS: 0
DIARRHEA: 0
NAUSEA: 0
DYSURIA: 0
SORE THROAT: 0
MYALGIAS: 0
RHINORRHEA: 0
ABDOMINAL DISTENTION: 0
UNEXPECTED WEIGHT CHANGE: 0
FEVER: 0
EYE DISCHARGE: 0
WEAKNESS: 0
TROUBLE SWALLOWING: 0
NERVOUS/ANXIOUS: 0
APPETITE CHANGE: 0
NECK STIFFNESS: 0
CONSTIPATION: 0
ARTHRALGIAS: 1
FLANK PAIN: 0
EYE REDNESS: 0
EYE ITCHING: 0
HALLUCINATIONS: 0
TREMORS: 0
ACTIVITY CHANGE: 0
SEIZURES: 0
FREQUENCY: 0
DYSPHORIC MOOD: 0
EYE PAIN: 0
SPEECH DIFFICULTY: 0
VOMITING: 0
WHEEZING: 0
PHOTOPHOBIA: 0
FACIAL ASYMMETRY: 0
BLOOD IN STOOL: 0
NUMBNESS: 0
BRUISES/BLEEDS EASILY: 0
BACK PAIN: 0
WOUND: 0
SINUS PRESSURE: 0
SLEEP DISTURBANCE: 0
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
## 2024-08-13 ENCOUNTER — APPOINTMENT (OUTPATIENT)
Dept: PHYSICAL THERAPY | Facility: CLINIC | Age: 54
End: 2024-08-13
Payer: COMMERCIAL

## 2024-08-13 DIAGNOSIS — Z12.11 COLON CANCER SCREENING: ICD-10-CM

## 2024-08-13 RX ORDER — SODIUM, POTASSIUM,MAG SULFATES 17.5-3.13G
SOLUTION, RECONSTITUTED, ORAL ORAL
Qty: 1 EACH | Refills: 0 | Status: SHIPPED | OUTPATIENT
Start: 2024-08-13

## 2024-08-15 ENCOUNTER — APPOINTMENT (OUTPATIENT)
Dept: PHYSICAL THERAPY | Facility: CLINIC | Age: 54
End: 2024-08-15
Payer: COMMERCIAL

## 2024-08-19 ENCOUNTER — APPOINTMENT (OUTPATIENT)
Dept: PHYSICAL THERAPY | Facility: CLINIC | Age: 54
End: 2024-08-19
Payer: COMMERCIAL

## 2024-08-20 ENCOUNTER — APPOINTMENT (OUTPATIENT)
Dept: PHYSICAL THERAPY | Facility: CLINIC | Age: 54
End: 2024-08-20
Payer: COMMERCIAL

## 2024-08-22 ENCOUNTER — TREATMENT (OUTPATIENT)
Dept: PHYSICAL THERAPY | Facility: CLINIC | Age: 54
End: 2024-08-22
Payer: COMMERCIAL

## 2024-08-22 ENCOUNTER — APPOINTMENT (OUTPATIENT)
Dept: PHYSICAL THERAPY | Facility: CLINIC | Age: 54
End: 2024-08-22
Payer: COMMERCIAL

## 2024-08-22 DIAGNOSIS — G89.18 ACUTE POSTOPERATIVE PAIN: ICD-10-CM

## 2024-08-22 PROCEDURE — 97113 AQUATIC THERAPY/EXERCISES: CPT | Mod: CQ,GP

## 2024-08-22 ASSESSMENT — PAIN DESCRIPTION - DESCRIPTORS: DESCRIPTORS: DULL;SHARP;TIGHTNESS

## 2024-08-22 ASSESSMENT — PAIN - FUNCTIONAL ASSESSMENT: PAIN_FUNCTIONAL_ASSESSMENT: 0-10

## 2024-08-22 ASSESSMENT — PAIN SCALES - GENERAL: PAINLEVEL_OUTOF10: 2

## 2024-08-22 NOTE — PROGRESS NOTES
"Physical Therapy Treatment    Patient Name: Chyna Lorenzo  MRN: [id number]  Today's Date: 8/22/2024  Time Calculation  Start Time: 1042  Stop Time: 1116  Time Calculation (min): 34 min    Insurance  Visit #3  20% coins, $500 / $1500 ded ($500 / $500 met), $3500 / $7000 oop ($3500 / $3822.78 met), no copay, 30v per ramiro yr (0v used as of 7/15/2024), SELMA ji thru [employer]      Current Problem  1. Acute postoperative pain  Follow Up In Physical Therapy          Subjective    General  Pt reports B knees are usually sore, tight & numb first thing in the morning.  States she took Ibuprofen prior to coming in to PT today.   Pt arrives to begin aquatic PT today following 2.5 week lapse in care.  She states she was out of town for a short time, but has been consistent with HEP while away.  She is hoping to \"become more limber\" with aquatic activities.     Precautions  Precautions  Precautions Comment: No recent falls reported    Pain  Pain Assessment  Pain Assessment: 0-10  0-10 (Numeric) Pain Score: 2 (at worst 7/10 (usually first thing in morning))  Pain Location: Knee  Pain Orientation: Right, Left  Pain Radiating Towards: Pt reports sharp pain into medial.lateral sides of B LE's  Pain Descriptors: Dull, Sharp, Tightness  Pain Frequency: Intermittent    Objective   Pt ambulates on pool deck without assistive device & states she forgot her cane in the car.  States she does still use SPC out in the community.  She does not use at home.  Antalgic gait on pool deck.    Treatments:  Aquatic Therapy (74446): 34 Minutes, 2 Units    Activities:  Aquatic Gait (F/B/L): 2 laps each  March in Place: x15  HR/TR: x15  Hip PRE's: x10 each ALISON  Squats: x15  HS Curl: x10 ALISON  LAQ: x10 ALISON  HS Stretch (at ramp) 30\" x1 ALISON  Calf Stretch (at ramp) 30\" x1 ALISON  Knee Flexion Stretch (lunge at ramp): 30\" x1 ALISON    Assessment:   Initiated aquatic activities this visit to increase ROM, strength in B LE's.  She demonstrates good ability to follow both " verbal, visual instruction provided for activities & completes with some difficulty, but good tolerance.  Normal muscle soreness, fatigue noted with activities given.  Anticipate she will do well with aquatic POC.    Plan:   Assess response to initial aquatic visit & continue to progress with activities as tolerated to increase ROM, flexibility, strength & stability in B LE's and improve overall functional mobility, capacity for daily activities.    OP EDUCATION:   Educated on DOMS, to which she exhibits good understanding.

## 2024-08-26 ENCOUNTER — APPOINTMENT (OUTPATIENT)
Dept: PHYSICAL THERAPY | Facility: CLINIC | Age: 54
End: 2024-08-26
Payer: COMMERCIAL

## 2024-08-27 ENCOUNTER — TREATMENT (OUTPATIENT)
Dept: PHYSICAL THERAPY | Facility: CLINIC | Age: 54
End: 2024-08-27
Payer: COMMERCIAL

## 2024-08-27 DIAGNOSIS — G89.18 ACUTE POSTOPERATIVE PAIN: ICD-10-CM

## 2024-08-27 PROCEDURE — 97140 MANUAL THERAPY 1/> REGIONS: CPT | Mod: GP | Performed by: GENERAL ACUTE CARE HOSPITAL

## 2024-08-27 PROCEDURE — 97110 THERAPEUTIC EXERCISES: CPT | Mod: GP | Performed by: GENERAL ACUTE CARE HOSPITAL

## 2024-08-27 NOTE — PROGRESS NOTES
Patient Name: Chyna Lorenzo  MRN: [id number]  Today's Date: 8/27/2024     Current Problem:  1. Acute postoperative pain  Follow Up In Physical Therapy          Visit 4/12 (10/26)    Subjective:  Change in pain/ pain level: 3/10  Change in function: not using cane   Patient comments: knees are more stiff.    Objective: Knee ROM R flex= 104, left flex 104     Treatment:    Therapeutic exercise (84871): nustep   Stretches    Manual Therapy (82086):   bilateral  Patella mobs proximal-distal, distal-proximal, lateral diagonals into stiffness  Tibial Femoral A-P flexion and A-P extension multiple knee flexion angles  Stretching into flexion and extension  DTM quadriceps, ITBand/ Hamstring    Assessment:  Patient notes improved pain levels resultant from activities in therapy session. Improved tissue quality noted as well as body mechanics demonstrated after cueing and corrections. Patient continues to require active therapy to progress functional capabilities with decreasing pain levels and improving mechanics with functional activities including progression of Home exercise program. Tolerance to today's treatment was good. Muscle fatigue noted.  Patient appears motivated and compliant this date, demonstrating a working understanding of principals instructed and home program.    Plan:  Progress with functional strength as tolerated with respect to tissue healing. Manual therapy PRN to improve/maintain ROM, prevent adhesion, reduce pain.

## 2024-08-29 ENCOUNTER — APPOINTMENT (OUTPATIENT)
Dept: PHYSICAL THERAPY | Facility: CLINIC | Age: 54
End: 2024-08-29
Payer: COMMERCIAL

## 2024-08-29 ENCOUNTER — TREATMENT (OUTPATIENT)
Dept: PHYSICAL THERAPY | Facility: CLINIC | Age: 54
End: 2024-08-29
Payer: COMMERCIAL

## 2024-08-29 DIAGNOSIS — G89.18 ACUTE POSTOPERATIVE PAIN: ICD-10-CM

## 2024-08-29 PROCEDURE — 97140 MANUAL THERAPY 1/> REGIONS: CPT | Mod: GP | Performed by: GENERAL ACUTE CARE HOSPITAL

## 2024-08-29 PROCEDURE — 97110 THERAPEUTIC EXERCISES: CPT | Mod: GP | Performed by: GENERAL ACUTE CARE HOSPITAL

## 2024-08-29 NOTE — PROGRESS NOTES
Patient Name: Chyna Lorenzo  MRN: [id number]  Today's Date: 8/29/2024     Current Problem:  1. Acute postoperative pain  Follow Up In Physical Therapy          Visit 5/12    Subjective:  Change in pain/ pain level: 4-5/10  Change in function: on my feet   Patient comments: knees were doing well the last few days     Objective: progressed hep to encourage quad use    Treatment:    Therapeutic exercise (50058):    nustep 5 min level 5   Stretches bilateral HS/GS 30 sec ea  Leg press 75# 2x20  HSC 35# 2x15  TKE black HEP  Step down 2in/4in  Manual Therapy (70350):  Patella mobs proximal-distal, distal-proximal, lateral diagonals into stiffness  Tibial Femoral A-P flexion and A-P extension multiple knee flexion angles  Stretching into flexion and extension  DTM quadriceps, ITBand/ Hamstring      Assessment:  Difficulty with step down using quad. Patient notes improved pain levels resultant from activities in therapy session. Improved tissue quality noted as well as body mechanics demonstrated after cueing and corrections. Patient continues to require active therapy to progress functional capabilities with decreasing pain levels and improving mechanics with functional activities including progression of Home exercise program. Tolerance to today's treatment was good. Muscle fatigue noted.  Patient appears motivated and compliant this date, demonstrating a working understanding of principals instructed and home program.    Plan:  Progress with functional strength as tolerated with respect to tissue healing. Manual therapy PRN to improve/maintain ROM, prevent adhesion, reduce pain.

## 2024-09-03 ENCOUNTER — TREATMENT (OUTPATIENT)
Dept: PHYSICAL THERAPY | Facility: CLINIC | Age: 54
End: 2024-09-03
Payer: COMMERCIAL

## 2024-09-03 DIAGNOSIS — G89.18 ACUTE POSTOPERATIVE PAIN: ICD-10-CM

## 2024-09-03 PROCEDURE — 97110 THERAPEUTIC EXERCISES: CPT | Mod: GP | Performed by: GENERAL ACUTE CARE HOSPITAL

## 2024-09-03 PROCEDURE — 97140 MANUAL THERAPY 1/> REGIONS: CPT | Mod: GP | Performed by: GENERAL ACUTE CARE HOSPITAL

## 2024-09-03 NOTE — PROGRESS NOTES
Patient Name: Chyna Lorenzo  MRN: [id number]  Today's Date: 9/3/2024     Current Problem:  1. Acute postoperative pain  Follow Up In Physical Therapy          Visit 6/12    Subjective:  Change in pain/ pain level: 3/10  Change in function: increased walking over the holiday weekend- knees feel stiff   Patient comments: happy with progress, although not sure about return to work     Objective: right knee flex- 106, left knee flex 104     Treatment:    Therapeutic exercise (02400):    Nustep 5 min  Strtetches: HS/GS quad  Titlboard L2 20/60 sec  Step up 6 in F x20  Leg Press 80# 2x20  HSC 45# 2x20   Manual Therapy (41365):  Patella mobs proximal-distal, distal-proximal, lateral diagonals into stiffness  Tibial Femoral A-P flexion and A-P extension multiple knee flexion angles  Stretching into flexion and extension  DTM quadriceps, ITBand/ Hamstring  Cupping scar       Assessment:  Patient notes improved pain levels resultant from activities in therapy session. Improved tissue quality noted as well as body mechanics demonstrated after cueing and corrections. Patient continues to require active therapy to progress functional capabilities with decreasing pain levels and improving mechanics with functional activities including progression of Home exercise program. Tolerance to today's treatment was good. Muscle fatigue noted.  Patient appears motivated and compliant this date, demonstrating a working understanding of principals instructed and home program.    Plan:  Progress with functional strength as tolerated with respect to tissue healing. Manual therapy PRN to improve/maintain ROM, prevent adhesion, reduce pain.

## 2024-09-04 ENCOUNTER — HOSPITAL ENCOUNTER (OUTPATIENT)
Dept: RADIOLOGY | Facility: CLINIC | Age: 54
Discharge: HOME | End: 2024-09-04
Payer: COMMERCIAL

## 2024-09-04 ENCOUNTER — OFFICE VISIT (OUTPATIENT)
Dept: ORTHOPEDIC SURGERY | Facility: CLINIC | Age: 54
End: 2024-09-04
Payer: COMMERCIAL

## 2024-09-04 DIAGNOSIS — M17.0 PRIMARY OSTEOARTHRITIS OF BOTH KNEES: ICD-10-CM

## 2024-09-04 DIAGNOSIS — Z96.653 HISTORY OF BILATERAL KNEE ARTHROPLASTY: ICD-10-CM

## 2024-09-04 DIAGNOSIS — Z96.653 HISTORY OF BILATERAL KNEE ARTHROPLASTY: Primary | ICD-10-CM

## 2024-09-04 PROCEDURE — 73560 X-RAY EXAM OF KNEE 1 OR 2: CPT | Mod: 50

## 2024-09-04 PROCEDURE — 99211 OFF/OP EST MAY X REQ PHY/QHP: CPT | Performed by: PHYSICIAN ASSISTANT

## 2024-09-04 PROCEDURE — 73560 X-RAY EXAM OF KNEE 1 OR 2: CPT | Mod: BILATERAL PROCEDURE | Performed by: ORTHOPAEDIC SURGERY

## 2024-09-04 RX ORDER — IBUPROFEN 800 MG/1
800 TABLET ORAL 3 TIMES DAILY
Qty: 90 TABLET | Refills: 0 | Status: SHIPPED | OUTPATIENT
Start: 2024-09-04 | End: 2024-10-04

## 2024-09-04 NOTE — PROGRESS NOTES
History of present illness patient is over 2 months status post bilateral total knee replacements.  Overall she is doing well.  She just needs to continue to work on endurance and strength.  Still reports a fair amount of swelling throughout the day in the left knee.      Physical exam:      General: No acute distress or breathing difficulty or discomfort, pleasant and cooperative with the examination.    Extremities: Right knee incisions clean dry and intact trace effusion current range of motion is 0 to 115 degrees  Left knee 1+ effusion no redness or drainage current range of motion is 0 to 110 degree      Diagnostic studies: X-rays 2 views bilateral knees show well aligned well-positioned bilateral total knee replacements    Impression: Status post bilateral total knee replacements    Plan: Patient will continue working with physical therapy on endurance and conditioning as she prepares to try to return to work.  She has a tentative return to work date at the end of September.  We will see her in 2 months with x-rays bilateral knees or sooner if she has any other issues.

## 2024-09-04 NOTE — LETTER
September 4, 2024     Patient: Chyna Lorenzo   YOB: 1970   Date of Visit: 9/4/2024       To Whom It May Concern:    It is my medical opinion that Chyna Lorenzo should remain out of work until 09-30-24 .    If you have any questions or concerns, please don't hesitate to call.         Sincerely,    Garrett Vizcaino PA-C    CC: No Recipients

## 2024-09-05 ENCOUNTER — APPOINTMENT (OUTPATIENT)
Dept: PHYSICAL THERAPY | Facility: CLINIC | Age: 54
End: 2024-09-05
Payer: COMMERCIAL

## 2024-09-10 ENCOUNTER — TREATMENT (OUTPATIENT)
Dept: PHYSICAL THERAPY | Facility: CLINIC | Age: 54
End: 2024-09-10
Payer: COMMERCIAL

## 2024-09-10 DIAGNOSIS — G89.18 ACUTE POSTOPERATIVE PAIN: ICD-10-CM

## 2024-09-10 PROCEDURE — 97110 THERAPEUTIC EXERCISES: CPT | Mod: GP | Performed by: GENERAL ACUTE CARE HOSPITAL

## 2024-09-10 PROCEDURE — 97140 MANUAL THERAPY 1/> REGIONS: CPT | Mod: GP | Performed by: GENERAL ACUTE CARE HOSPITAL

## 2024-09-10 NOTE — PROGRESS NOTES
Patient Name: Chyna Lorenzo  MRN: [id number]  Today's Date: 9/10/2024     Current Problem:  1. Acute postoperative pain  Follow Up In Physical Therapy          Visit 7/12    Subjective:  Change in pain/ pain level: 4/10  Change in function: improving- able to dance a little at daughter wedding. Trying to do the gym daily to get ready for 12 hour work day   Patient comments: RTW Oct 31    Treatment:    Therapeutic exercise (16537):  Nustep 5 min  Strtetches: HS/GS quad  Titlboard L2 20/60 sec  Step up 6 in F x20  Leg Press 90# 2x20  HSC 45# 2x20   Cupping scar   Manual Therapy (20539):  Patella mobs proximal-distal, distal-proximal, lateral diagonals into stiffness  Tibial Femoral A-P flexion and A-P extension multiple knee flexion angles  Stretching into flexion and extension  DTM quadriceps, ITBand/ Hamstring  Cupping scar     Assessment:  Patient notes improved pain levels resultant from activities in therapy session. Improved tissue quality noted as well as body mechanics demonstrated after cueing and corrections. Patient continues to require active therapy to progress functional capabilities with decreasing pain levels and improving mechanics with functional activities including progression of Home exercise program. Tolerance to today's treatment was good. Muscle fatigue noted.  Patient appears motivated and compliant this date, demonstrating a working understanding of principals instructed and home program.    Plan:  Progress with functional strength as tolerated with respect to tissue healing. Manual therapy PRN to improve/maintain ROM, prevent adhesion, reduce pain.

## 2024-09-12 ENCOUNTER — APPOINTMENT (OUTPATIENT)
Dept: PHYSICAL THERAPY | Facility: CLINIC | Age: 54
End: 2024-09-12
Payer: COMMERCIAL

## 2024-09-17 ENCOUNTER — TREATMENT (OUTPATIENT)
Dept: PHYSICAL THERAPY | Facility: CLINIC | Age: 54
End: 2024-09-17
Payer: COMMERCIAL

## 2024-09-17 DIAGNOSIS — G89.18 ACUTE POSTOPERATIVE PAIN: ICD-10-CM

## 2024-09-17 PROCEDURE — 97110 THERAPEUTIC EXERCISES: CPT | Mod: GP | Performed by: GENERAL ACUTE CARE HOSPITAL

## 2024-09-17 PROCEDURE — 97140 MANUAL THERAPY 1/> REGIONS: CPT | Mod: GP | Performed by: GENERAL ACUTE CARE HOSPITAL

## 2024-09-17 NOTE — PROGRESS NOTES
Patient Name: Chyna Lorenzo  MRN: [id number]  Today's Date: 9/17/2024     Current Problem:  1. Acute postoperative pain  Follow Up In Physical Therapy          Visit 8/12    Subjective:  Change in pain/ pain level: 0/10  Change in function: cleaning house  Patient comments: left knee hurts some    Objective: Knee flexion ROM: Right- 115, left- 110     Treatment:    Therapeutic exercise (09059):  Nustep 5 min  Strtetches: HS/GS quad  Titlboard L2 20/60 sec  Step up 6 in F x20  Leg Press 95# 2x20  HSC 45# 2x20     Manual Therapy (04383):  Patella mobs proximal-distal, distal-proximal, lateral diagonals into stiffness  Tibial Femoral A-P flexion and A-P extension multiple knee flexion angles  Stretching into flexion and extension  DTM quadriceps, ITBand/ Hamstring      Assessment:  Patient notes improved pain levels resultant from activities in therapy session. Improved tissue quality noted as well as body mechanics demonstrated after cueing and corrections. Patient continues to require active therapy to progress functional capabilities with decreasing pain levels and improving mechanics with functional activities including progression of Home exercise program. Tolerance to today's treatment was good. Muscle fatigue noted.  Patient appears motivated and compliant this date, demonstrating a working understanding of principals instructed and home program.    Plan:  Progress with functional strength as tolerated with respect to tissue healing. Manual therapy PRN to improve/maintain ROM, prevent adhesion, reduce pain.

## 2024-09-18 ENCOUNTER — APPOINTMENT (OUTPATIENT)
Dept: PRIMARY CARE | Facility: CLINIC | Age: 54
End: 2024-09-18
Payer: COMMERCIAL

## 2024-09-19 ENCOUNTER — APPOINTMENT (OUTPATIENT)
Dept: PHYSICAL THERAPY | Facility: CLINIC | Age: 54
End: 2024-09-19
Payer: COMMERCIAL

## 2024-09-24 ENCOUNTER — TREATMENT (OUTPATIENT)
Dept: PHYSICAL THERAPY | Facility: CLINIC | Age: 54
End: 2024-09-24
Payer: COMMERCIAL

## 2024-09-24 DIAGNOSIS — G89.18 ACUTE POSTOPERATIVE PAIN: ICD-10-CM

## 2024-09-26 ENCOUNTER — TREATMENT (OUTPATIENT)
Dept: PHYSICAL THERAPY | Facility: CLINIC | Age: 54
End: 2024-09-26
Payer: COMMERCIAL

## 2024-09-26 ENCOUNTER — APPOINTMENT (OUTPATIENT)
Dept: PHYSICAL THERAPY | Facility: CLINIC | Age: 54
End: 2024-09-26
Payer: COMMERCIAL

## 2024-09-26 DIAGNOSIS — G89.18 ACUTE POSTOPERATIVE PAIN: ICD-10-CM

## 2024-09-26 DIAGNOSIS — Z96.653 HISTORY OF KNEE REPLACEMENT, TOTAL, BILATERAL: Primary | ICD-10-CM

## 2024-09-30 DIAGNOSIS — Z96.653 HISTORY OF BILATERAL KNEE ARTHROPLASTY: ICD-10-CM

## 2024-09-30 RX ORDER — IBUPROFEN 800 MG/1
800 TABLET ORAL 3 TIMES DAILY
Qty: 90 TABLET | Refills: 0 | Status: SHIPPED | OUTPATIENT
Start: 2024-09-30

## 2024-10-01 ENCOUNTER — APPOINTMENT (OUTPATIENT)
Dept: PHYSICAL THERAPY | Facility: CLINIC | Age: 54
End: 2024-10-01
Payer: COMMERCIAL

## 2024-10-03 ENCOUNTER — APPOINTMENT (OUTPATIENT)
Dept: PHYSICAL THERAPY | Facility: CLINIC | Age: 54
End: 2024-10-03
Payer: COMMERCIAL

## 2024-10-07 ENCOUNTER — APPOINTMENT (OUTPATIENT)
Dept: GASTROENTEROLOGY | Facility: EXTERNAL LOCATION | Age: 54
End: 2024-10-07
Payer: COMMERCIAL

## 2024-10-08 ENCOUNTER — APPOINTMENT (OUTPATIENT)
Dept: PHYSICAL THERAPY | Facility: CLINIC | Age: 54
End: 2024-10-08
Payer: COMMERCIAL

## 2024-10-10 ENCOUNTER — APPOINTMENT (OUTPATIENT)
Dept: PHYSICAL THERAPY | Facility: CLINIC | Age: 54
End: 2024-10-10
Payer: COMMERCIAL

## 2024-10-14 ENCOUNTER — APPOINTMENT (OUTPATIENT)
Dept: PHYSICAL THERAPY | Facility: CLINIC | Age: 54
End: 2024-10-14
Payer: COMMERCIAL

## 2024-10-14 DIAGNOSIS — N95.9 POSTMENOPAUSAL SYMPTOMS: ICD-10-CM

## 2024-10-14 RX ORDER — CONJUGATED ESTROGENS AND MEDROXYPROGESTERONE ACETATE .625; 2.5 MG/1; MG/1
1 TABLET, SUGAR COATED ORAL DAILY
Qty: 90 TABLET | Refills: 2 | Status: SHIPPED | OUTPATIENT
Start: 2024-10-14 | End: 2025-10-14

## 2024-10-28 ENCOUNTER — APPOINTMENT (OUTPATIENT)
Dept: PHYSICAL THERAPY | Facility: CLINIC | Age: 54
End: 2024-10-28
Payer: COMMERCIAL

## 2024-10-31 ENCOUNTER — APPOINTMENT (OUTPATIENT)
Dept: PHYSICAL THERAPY | Facility: CLINIC | Age: 54
End: 2024-10-31
Payer: COMMERCIAL

## 2024-11-18 ENCOUNTER — APPOINTMENT (OUTPATIENT)
Dept: PRIMARY CARE | Facility: CLINIC | Age: 54
End: 2024-11-18
Payer: COMMERCIAL

## 2024-11-18 ENCOUNTER — TELEPHONE (OUTPATIENT)
Dept: PRIMARY CARE | Facility: CLINIC | Age: 54
End: 2024-11-18

## 2024-11-18 VITALS
WEIGHT: 196.2 LBS | OXYGEN SATURATION: 100 % | HEART RATE: 55 BPM | SYSTOLIC BLOOD PRESSURE: 130 MMHG | RESPIRATION RATE: 16 BRPM | DIASTOLIC BLOOD PRESSURE: 83 MMHG | BODY MASS INDEX: 31.53 KG/M2 | HEIGHT: 66 IN | TEMPERATURE: 97.1 F

## 2024-11-18 DIAGNOSIS — F32.A DEPRESSION, UNSPECIFIED DEPRESSION TYPE: ICD-10-CM

## 2024-11-18 DIAGNOSIS — E66.811 CLASS 1 OBESITY DUE TO EXCESS CALORIES WITH SERIOUS COMORBIDITY AND BODY MASS INDEX (BMI) OF 31.0 TO 31.9 IN ADULT: Primary | ICD-10-CM

## 2024-11-18 DIAGNOSIS — E66.09 CLASS 1 OBESITY DUE TO EXCESS CALORIES WITH SERIOUS COMORBIDITY AND BODY MASS INDEX (BMI) OF 31.0 TO 31.9 IN ADULT: ICD-10-CM

## 2024-11-18 DIAGNOSIS — E66.09 CLASS 1 OBESITY DUE TO EXCESS CALORIES WITH SERIOUS COMORBIDITY AND BODY MASS INDEX (BMI) OF 31.0 TO 31.9 IN ADULT: Primary | ICD-10-CM

## 2024-11-18 DIAGNOSIS — E66.811 CLASS 1 OBESITY DUE TO EXCESS CALORIES WITH SERIOUS COMORBIDITY AND BODY MASS INDEX (BMI) OF 31.0 TO 31.9 IN ADULT: ICD-10-CM

## 2024-11-18 DIAGNOSIS — Z12.31 BREAST CANCER SCREENING BY MAMMOGRAM: ICD-10-CM

## 2024-11-18 DIAGNOSIS — F41.9 ANXIETY: Primary | ICD-10-CM

## 2024-11-18 PROCEDURE — 99214 OFFICE O/P EST MOD 30 MIN: CPT | Performed by: FAMILY MEDICINE

## 2024-11-18 PROCEDURE — 3008F BODY MASS INDEX DOCD: CPT | Performed by: FAMILY MEDICINE

## 2024-11-18 PROCEDURE — 1036F TOBACCO NON-USER: CPT | Performed by: FAMILY MEDICINE

## 2024-11-18 RX ORDER — SEMAGLUTIDE 0.25 MG/.5ML
0.25 INJECTION, SOLUTION SUBCUTANEOUS WEEKLY
Qty: 2 ML | Refills: 1 | Status: SHIPPED | OUTPATIENT
Start: 2024-11-18 | End: 2024-11-18 | Stop reason: SDUPTHER

## 2024-11-18 RX ORDER — FLUOXETINE 10 MG/1
10 CAPSULE ORAL DAILY
Qty: 30 CAPSULE | Refills: 5 | Status: SHIPPED | OUTPATIENT
Start: 2024-11-18 | End: 2025-05-17

## 2024-11-18 RX ORDER — SEMAGLUTIDE 0.25 MG/.5ML
0.25 INJECTION, SOLUTION SUBCUTANEOUS WEEKLY
Qty: 2 ML | Refills: 1 | Status: SHIPPED | OUTPATIENT
Start: 2024-11-18 | End: 2024-11-22 | Stop reason: SDUPTHER

## 2024-11-18 ASSESSMENT — ENCOUNTER SYMPTOMS
ABDOMINAL DISTENTION: 0
HALLUCINATIONS: 0
HEMATURIA: 0
APPETITE CHANGE: 0
DEPRESSION: 1
NUMBNESS: 0
CONSTIPATION: 0
LIGHT-HEADEDNESS: 0
POLYDIPSIA: 0
BRUISES/BLEEDS EASILY: 0
CHILLS: 0
NECK PAIN: 0
BLOOD IN STOOL: 0
EYE ITCHING: 0
RHINORRHEA: 0
ARTHRALGIAS: 1
CHEST TIGHTNESS: 0
PSYCHOMOTOR RETARDATION: 1
PHOTOPHOBIA: 0
DYSPHORIC MOOD: 0
TREMORS: 0
UNEXPECTED WEIGHT CHANGE: 0
SEIZURES: 0
HEADACHES: 0
EYE REDNESS: 0
NERVOUS/ANXIOUS: 1
BACK PAIN: 0
AGITATION: 0
SLEEP DISTURBANCE: 0
FACIAL ASYMMETRY: 0
ACTIVITY CHANGE: 0
DEPRESSED MOOD: 1
VOICE CHANGE: 0
SINUS PRESSURE: 0
JOINT SWELLING: 0
FLANK PAIN: 0
FEVER: 0
EYE PAIN: 0
NECK STIFFNESS: 0
ADENOPATHY: 0
VOMITING: 0
MYALGIAS: 0
DYSURIA: 0
DIARRHEA: 0
EYE DISCHARGE: 0
DIAPHORESIS: 0
FREQUENCY: 0
SPEECH DIFFICULTY: 0
FATIGUE: 1
WEAKNESS: 0
TROUBLE SWALLOWING: 0
WOUND: 0

## 2024-11-18 ASSESSMENT — ANXIETY QUESTIONNAIRES
IF YOU CHECKED OFF ANY PROBLEMS ON THIS QUESTIONNAIRE, HOW DIFFICULT HAVE THESE PROBLEMS MADE IT FOR YOU TO DO YOUR WORK, TAKE CARE OF THINGS AT HOME, OR GET ALONG WITH OTHER PEOPLE: VERY DIFFICULT
6. BECOMING EASILY ANNOYED OR IRRITABLE: NEARLY EVERY DAY
4. TROUBLE RELAXING: NEARLY EVERY DAY
2. NOT BEING ABLE TO STOP OR CONTROL WORRYING: NEARLY EVERY DAY
GAD7 TOTAL SCORE: 16
3. WORRYING TOO MUCH ABOUT DIFFERENT THINGS: MORE THAN HALF THE DAYS
7. FEELING AFRAID AS IF SOMETHING AWFUL MIGHT HAPPEN: MORE THAN HALF THE DAYS
1. FEELING NERVOUS, ANXIOUS, OR ON EDGE: NEARLY EVERY DAY
5. BEING SO RESTLESS THAT IT IS HARD TO SIT STILL: NOT AT ALL

## 2024-11-18 ASSESSMENT — PATIENT HEALTH QUESTIONNAIRE - PHQ9
1. LITTLE INTEREST OR PLEASURE IN DOING THINGS: NEARLY EVERY DAY
9. THOUGHTS THAT YOU WOULD BE BETTER OFF DEAD, OR OF HURTING YOURSELF: NOT AT ALL
3. TROUBLE FALLING OR STAYING ASLEEP OR SLEEPING TOO MUCH: NEARLY EVERY DAY
7. TROUBLE CONCENTRATING ON THINGS, SUCH AS READING THE NEWSPAPER OR WATCHING TELEVISION: MORE THAN HALF THE DAYS
2. FEELING DOWN, DEPRESSED OR HOPELESS: MORE THAN HALF THE DAYS
5. POOR APPETITE OR OVEREATING: MORE THAN HALF THE DAYS
4. FEELING TIRED OR HAVING LITTLE ENERGY: NEARLY EVERY DAY
SUM OF ALL RESPONSES TO PHQ QUESTIONS 1-9: 18
SUM OF ALL RESPONSES TO PHQ9 QUESTIONS 1 AND 2: 5
6. FEELING BAD ABOUT YOURSELF - OR THAT YOU ARE A FAILURE OR HAVE LET YOURSELF OR YOUR FAMILY DOWN: NEARLY EVERY DAY
8. MOVING OR SPEAKING SO SLOWLY THAT OTHER PEOPLE COULD HAVE NOTICED. OR THE OPPOSITE, BEING SO FIGETY OR RESTLESS THAT YOU HAVE BEEN MOVING AROUND A LOT MORE THAN USUAL: NOT AT ALL

## 2024-11-18 NOTE — PROGRESS NOTES
Subjective   Patient ID: Chyna Lorenzo is a 54 y.o. female who presents for Anxiety (Anxiety is getting worse, not sure if she should be on a med.) and Weight Loss (Discuss options to loss weight).    Fatigue  This is a chronic problem. Associated symptoms include arthralgias and fatigue. Pertinent negatives include no chills, congestion, diaphoresis, fever, headaches, joint swelling, myalgias, neck pain, numbness, rash, vomiting or weakness.   Anxiety  Presents for follow-up visit. Symptoms include depressed mood, excessive worry and nervous/anxious behavior.       Depression  Visit Type: follow-up  Patient presents with the following symptoms: depressed mood, excessive worry, nervousness/anxiety and psychomotor retardation.          Review of Systems   Constitutional:  Positive for fatigue. Negative for activity change, appetite change, chills, diaphoresis, fever and unexpected weight change.   HENT:  Negative for congestion, ear pain, hearing loss, nosebleeds, postnasal drip, rhinorrhea, sinus pressure, sneezing, tinnitus, trouble swallowing and voice change.    Eyes:  Negative for photophobia, pain, discharge, redness, itching and visual disturbance.   Respiratory:  Negative for chest tightness.    Cardiovascular:  Negative for leg swelling.   Gastrointestinal:  Negative for abdominal distention, blood in stool, constipation, diarrhea and vomiting.   Endocrine: Negative for cold intolerance, heat intolerance, polydipsia and polyuria.   Genitourinary:  Negative for dysuria, flank pain, frequency, hematuria and urgency.   Musculoskeletal:  Positive for arthralgias and gait problem. Negative for back pain, joint swelling, myalgias, neck pain and neck stiffness.        Status post bilateral knee replacement   Skin:  Negative for rash and wound.   Allergic/Immunologic: Negative for immunocompromised state.   Neurological:  Negative for tremors, seizures, syncope, facial asymmetry, speech difficulty, weakness,  "light-headedness, numbness and headaches.   Hematological:  Negative for adenopathy. Does not bruise/bleed easily.   Psychiatric/Behavioral:  Positive for depression. Negative for agitation, behavioral problems, dysphoric mood, hallucinations, self-injury and sleep disturbance. The patient is nervous/anxious.        Objective   /83 (BP Location: Right arm, Patient Position: Sitting, BP Cuff Size: Large adult)   Pulse 55   Temp 36.2 °C (97.1 °F) (Temporal)   Resp 16   Ht 1.676 m (5' 6\")   Wt 89 kg (196 lb 3.2 oz)   SpO2 100%   BMI 31.67 kg/m²     Physical Exam  Constitutional:       General: She is not in acute distress.     Appearance: She is not ill-appearing or diaphoretic.   HENT:      Head: Normocephalic and atraumatic.      Right Ear: External ear normal.      Left Ear: External ear normal.      Nose: Nose normal. No rhinorrhea.   Eyes:      General: Lids are normal. No scleral icterus.        Right eye: No discharge.         Left eye: No discharge.      Conjunctiva/sclera: Conjunctivae normal.   Cardiovascular:      Rate and Rhythm: Normal rate and regular rhythm.      Pulses: Normal pulses.      Heart sounds: No murmur heard.  Pulmonary:      Effort: Pulmonary effort is normal. No respiratory distress.      Breath sounds: No decreased breath sounds, wheezing, rhonchi or rales.   Abdominal:      General: Bowel sounds are normal. There is no distension.      Palpations: Abdomen is soft. There is no mass.      Tenderness: There is no abdominal tenderness. There is no guarding or rebound.   Musculoskeletal:         General: No swelling, tenderness or deformity.      Cervical back: No rigidity or tenderness.      Right lower leg: No edema.      Left lower leg: No edema.   Lymphadenopathy:      Cervical: No cervical adenopathy.      Upper Body:      Right upper body: No supraclavicular adenopathy.      Left upper body: No supraclavicular adenopathy.   Skin:     General: Skin is warm and dry.      " Coloration: Skin is not jaundiced or pale.      Findings: No erythema, lesion or rash.   Neurological:      General: No focal deficit present.      Mental Status: She is alert and oriented to person, place, and time.      Sensory: No sensory deficit.      Motor: No weakness or tremor.      Coordination: Coordination normal.      Gait: Gait normal.   Psychiatric:         Mood and Affect: Mood normal. Affect is not inappropriate.         Behavior: Behavior normal.         Assessment/Plan   Diagnoses and all orders for this visit:  Anxiety  -     FLUoxetine (PROzac) 10 mg capsule; Take 1 capsule (10 mg) by mouth once daily.  -     Follow Up In [facility] - Behavioral Health [facility]; Future  -     Follow Up In [facility] - PCP - [facility]; Future  Breast cancer screening by mammogram  -     BI mammo bilateral screening tomosynthesis; Future  Depression, unspecified depression type  -     FLUoxetine (PROzac) 10 mg capsule; Take 1 capsule (10 mg) by mouth once daily.  -     Follow Up In [facility] - Behavioral Health [facility]; Future  -     Follow Up In [facility]  PCP [facility]; Future  Class 1 obesity due to excess calories with serious comorbidity and body mass index (BMI) of 31.0 to 31.9 in adult  -     Follow Up In [facility] PCP [facility]; Future  -     semaglutide, weight loss, (Wegovy) 0.25 mg/0.5 mL pen injector; Inject 0.25 mg under the skin 1 (one) time per week for 8 doses.

## 2024-11-18 NOTE — TELEPHONE ENCOUNTER
Patient phones the office back today following her appointment earlier today w/ TW.     Patient was advised to contact the office back w/ the name of medication that her insurance company will cover.     Her insurance will cover Wegovy, although it will require a prior authorization.     Please send script for Wegovy into [facility] in [place] on [address]     Thank you.

## 2024-11-22 DIAGNOSIS — E66.811 CLASS 1 OBESITY DUE TO EXCESS CALORIES WITH SERIOUS COMORBIDITY AND BODY MASS INDEX (BMI) OF 31.0 TO 31.9 IN ADULT: ICD-10-CM

## 2024-11-22 DIAGNOSIS — E66.09 CLASS 1 OBESITY DUE TO EXCESS CALORIES WITH SERIOUS COMORBIDITY AND BODY MASS INDEX (BMI) OF 31.0 TO 31.9 IN ADULT: ICD-10-CM

## 2024-11-22 RX ORDER — SEMAGLUTIDE 0.25 MG/.5ML
0.25 INJECTION, SOLUTION SUBCUTANEOUS WEEKLY
Qty: 2 ML | Refills: 1 | Status: SHIPPED | OUTPATIENT
Start: 2024-11-22 | End: 2025-01-11

## 2024-11-26 ENCOUNTER — TELEPHONE (OUTPATIENT)
Dept: PRIMARY CARE | Facility: CLINIC | Age: 54
End: 2024-11-26
Payer: COMMERCIAL

## 2024-11-26 NOTE — PROGRESS NOTES
Writer outreached pt regarding their referral to Collaborative Care. Explained program and answered pt's questions. Pt requested to schedule future initial assessment. Pt is scheduled for 12/6/24 @10:30am virtually.    Welcome packet containing FAQ, explanation of COCM, and billing sent via email.

## 2024-11-27 ENCOUNTER — TELEPHONE (OUTPATIENT)
Dept: PHARMACY | Facility: HOSPITAL | Age: 54
End: 2024-11-27
Payer: COMMERCIAL

## 2024-11-27 DIAGNOSIS — E66.09 CLASS 1 OBESITY DUE TO EXCESS CALORIES WITH SERIOUS COMORBIDITY AND BODY MASS INDEX (BMI) OF 31.0 TO 31.9 IN ADULT: ICD-10-CM

## 2024-11-27 DIAGNOSIS — E66.811 CLASS 1 OBESITY DUE TO EXCESS CALORIES WITH SERIOUS COMORBIDITY AND BODY MASS INDEX (BMI) OF 31.0 TO 31.9 IN ADULT: ICD-10-CM

## 2024-11-27 RX ORDER — SEMAGLUTIDE 0.25 MG/.5ML
0.25 INJECTION, SOLUTION SUBCUTANEOUS WEEKLY
Qty: 2 ML | Refills: 1 | Status: SHIPPED | OUTPATIENT
Start: 2024-11-27 | End: 2025-01-16

## 2024-11-27 NOTE — TELEPHONE ENCOUNTER
Prior Authorization Approval    Prior authorization approved for Wegovy until 6/2025. Case ID: [id number]     Spoke with patient today to notify. Patient requesting prescription at [facility] be cancelled, and resent to [facility] in [place]. Pended to PCP.  If additional assistance is needed, advised patient to call [facility] at [phone].    Please reach out to clinical pharmacy team with any questions/concerns.    Thank you,  Lorenza Orellana, PharmD

## 2024-12-06 ENCOUNTER — APPOINTMENT (OUTPATIENT)
Dept: PRIMARY CARE | Facility: CLINIC | Age: 54
End: 2024-12-06
Payer: COMMERCIAL

## 2024-12-06 ASSESSMENT — PATIENT HEALTH QUESTIONNAIRE - PHQ9
7. TROUBLE CONCENTRATING ON THINGS, SUCH AS READING THE NEWSPAPER OR WATCHING TELEVISION: SEVERAL DAYS
3. TROUBLE FALLING OR STAYING ASLEEP: NEARLY EVERY DAY
SUM OF ALL RESPONSES TO PHQ QUESTIONS 1-9: 9
1. LITTLE INTEREST OR PLEASURE IN DOING THINGS: SEVERAL DAYS
2. FEELING DOWN, DEPRESSED OR HOPELESS: NOT AT ALL
5. POOR APPETITE OR OVEREATING: NOT AT ALL
10. IF YOU CHECKED OFF ANY PROBLEMS, HOW DIFFICULT HAVE THESE PROBLEMS MADE IT FOR YOU TO DO YOUR WORK, TAKE CARE OF THINGS AT HOME, OR GET ALONG WITH OTHER PEOPLE: NOT DIFFICULT AT ALL
8. MOVING OR SPEAKING SO SLOWLY THAT OTHER PEOPLE COULD HAVE NOTICED. OR THE OPPOSITE, BEING SO FIGETY OR RESTLESS THAT YOU HAVE BEEN MOVING AROUND A LOT MORE THAN USUAL: NOT AT ALL
SUM OF ALL RESPONSES TO PHQ9 QUESTIONS 1 & 2: 1
4. FEELING TIRED OR HAVING LITTLE ENERGY: NEARLY EVERY DAY
9. THOUGHTS THAT YOU WOULD BE BETTER OFF DEAD, OR OF HURTING YOURSELF: NOT AT ALL
6. FEELING BAD ABOUT YOURSELF - OR THAT YOU ARE A FAILURE OR HAVE LET YOURSELF OR YOUR FAMILY DOWN: SEVERAL DAYS

## 2024-12-06 ASSESSMENT — ANXIETY QUESTIONNAIRES
1. FEELING NERVOUS, ANXIOUS, OR ON EDGE: MORE THAN HALF THE DAYS
3. WORRYING TOO MUCH ABOUT DIFFERENT THINGS: NEARLY EVERY DAY
2. NOT BEING ABLE TO STOP OR CONTROL WORRYING: NEARLY EVERY DAY
4. TROUBLE RELAXING: NEARLY EVERY DAY
GAD7 TOTAL SCORE: 11
7. FEELING AFRAID AS IF SOMETHING AWFUL MIGHT HAPPEN: NOT AT ALL
IF YOU CHECKED OFF ANY PROBLEMS ON THIS QUESTIONNAIRE, HOW DIFFICULT HAVE THESE PROBLEMS MADE IT FOR YOU TO DO YOUR WORK, TAKE CARE OF THINGS AT HOME, OR GET ALONG WITH OTHER PEOPLE: NOT DIFFICULT AT ALL
5. BEING SO RESTLESS THAT IT IS HARD TO SIT STILL: NOT AT ALL
6. BECOMING EASILY ANNOYED OR IRRITABLE: NOT AT ALL
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
## 2024-12-11 ENCOUNTER — DOCUMENTATION (OUTPATIENT)
Dept: BEHAVIORAL HEALTH | Facility: CLINIC | Age: 54
End: 2024-12-11
Payer: COMMERCIAL

## 2024-12-11 NOTE — PROGRESS NOTES
[facility] Psychiatry Consult Note     Chyna Lorenzo is a 54 y.o., referred to Collaborative Care for symptoms of depression and anxiety. I have reviewed the patient with the behavioral health manager and reviewed the patient's electronic record. In setting of double knee replacement surgery    Current Meds:  Fluoxetine 10mg daily for depression    Past meds:  Trazodone in past, didn't work    Recommendations:   No med changes for now at request of patient - can consider medications if psychotherapy does not help      Patient Health Questionnaire-9 Score: 9 (12/6/2024 10:48 AM)  MERLIN-7 Total Score: 11 (12/6/2024 10:46 AM)      The above treatment considerations and suggestions are based on consultations with the patient's care manager and a review of information available in the electronic medical record. I have not personally examined the patient. All recommendations should be implemented with consideration of the patient's relevant prior history and current clinical status. Please feel free to call me with any questions about the care of this patient.

## 2024-12-13 ENCOUNTER — APPOINTMENT (OUTPATIENT)
Dept: PRIMARY CARE | Facility: CLINIC | Age: 54
End: 2024-12-13
Payer: COMMERCIAL

## 2024-12-13 NOTE — PROGRESS NOTES
"Collaborative Care (Co[facility])  Progress Note    Type of Interaction: Virtual    Start Time: 10:30am     End Time: 11:29am    Appointment: Scheduled    Reason for Visit:   Chief Complaint   Patient presents with    AD (Adjustment Disorder)      Interval History / Patient Symptoms:     Patient Health Questionnaire-9 Score: 9 (12/6/2024 10:48 AM)  MERLIN-7 Total Score: 11 (12/6/2024 10:46 AM)    Interventions Provided: Behavioral Activation, Develop Coping Strategies, Mindfulness, and psych consult recommendations    BHM and PT discussed psych consult recommendations--PT reported that she feels her medications are going well and is interested in counseling.  BHM and PT discussed PT's difficulty with memory and not being able to work because of her knees how it often triggers anxiousness and depression.  Worried about an upcoming appointment to address her memory with neuro.  [facility] provided PT with grounding and mindfulness strategies to stay present and slow her mind down to assist with memory and anxiousness.  Discussed behavioral activation as intervention for her depression.  Reported she has been feeling \"better\" this past week because she has been busier with family and getting ready for [place].      Progress Made: Minimum    Response to Intervention: Engaged in session and responsive to recommendations.    Patient Instructions   Practice anxiety and depression coping skills sent via email.  Download and checkout [employer] timer cordell for meditation.    Follow Up / Next Appointment: Next appointment: 12/19/24  "

## 2024-12-13 NOTE — PATIENT INSTRUCTIONS
Practice anxiety and depression coping skills sent via email.  Download and checkout [employer] timer cordell for meditation.

## 2024-12-19 ENCOUNTER — APPOINTMENT (OUTPATIENT)
Dept: PRIMARY CARE | Facility: CLINIC | Age: 54
End: 2024-12-19
Payer: COMMERCIAL

## 2025-01-03 ENCOUNTER — TELEMEDICINE (OUTPATIENT)
Dept: PRIMARY CARE | Facility: CLINIC | Age: 55
End: 2025-01-03
Payer: COMMERCIAL

## 2025-01-03 ENCOUNTER — DOCUMENTATION (OUTPATIENT)
Dept: PRIMARY CARE | Facility: CLINIC | Age: 55
End: 2025-01-03

## 2025-01-03 ENCOUNTER — APPOINTMENT (OUTPATIENT)
Dept: PRIMARY CARE | Facility: CLINIC | Age: 55
End: 2025-01-03
Payer: COMMERCIAL

## 2025-01-03 VITALS — HEIGHT: 66 IN | WEIGHT: 187 LBS | BODY MASS INDEX: 30.05 KG/M2

## 2025-01-03 DIAGNOSIS — F41.9 ANXIETY: ICD-10-CM

## 2025-01-03 DIAGNOSIS — E66.09 CLASS 1 OBESITY DUE TO EXCESS CALORIES WITH SERIOUS COMORBIDITY AND BODY MASS INDEX (BMI) OF 31.0 TO 31.9 IN ADULT: ICD-10-CM

## 2025-01-03 DIAGNOSIS — F32.A DEPRESSION, UNSPECIFIED DEPRESSION TYPE: ICD-10-CM

## 2025-01-03 DIAGNOSIS — E66.811 CLASS 1 OBESITY DUE TO EXCESS CALORIES WITH SERIOUS COMORBIDITY AND BODY MASS INDEX (BMI) OF 31.0 TO 31.9 IN ADULT: ICD-10-CM

## 2025-01-03 DIAGNOSIS — N95.9 POSTMENOPAUSAL SYMPTOMS: ICD-10-CM

## 2025-01-03 DIAGNOSIS — F32.1 CURRENT MODERATE EPISODE OF MAJOR DEPRESSIVE DISORDER WITHOUT PRIOR EPISODE (MULTI): Primary | ICD-10-CM

## 2025-01-03 PROCEDURE — 1036F TOBACCO NON-USER: CPT | Performed by: FAMILY MEDICINE

## 2025-01-03 PROCEDURE — 99213 OFFICE O/P EST LOW 20 MIN: CPT | Performed by: FAMILY MEDICINE

## 2025-01-03 PROCEDURE — 3008F BODY MASS INDEX DOCD: CPT | Performed by: FAMILY MEDICINE

## 2025-01-03 RX ORDER — CONJUGATED ESTROGENS AND MEDROXYPROGESTERONE ACETATE .625; 2.5 MG/1; MG/1
1 TABLET, SUGAR COATED ORAL DAILY
Qty: 90 TABLET | Refills: 2 | Status: SHIPPED | OUTPATIENT
Start: 2025-01-03 | End: 2026-01-03

## 2025-01-03 RX ORDER — FLUOXETINE HYDROCHLORIDE 20 MG/1
20 CAPSULE ORAL DAILY
Qty: 90 CAPSULE | Refills: 3 | Status: SHIPPED | OUTPATIENT
Start: 2025-01-03 | End: 2026-01-03

## 2025-01-03 RX ORDER — SEMAGLUTIDE 0.5 MG/.5ML
0.5 INJECTION, SOLUTION SUBCUTANEOUS WEEKLY
Qty: 2 ML | Refills: 11 | Status: SHIPPED | OUTPATIENT
Start: 2025-01-03 | End: 2025-11-29

## 2025-01-03 ASSESSMENT — ENCOUNTER SYMPTOMS
TROUBLE SWALLOWING: 0
VOICE CHANGE: 0
ABDOMINAL DISTENTION: 0
FEVER: 0
UNEXPECTED WEIGHT CHANGE: 0
DYSPHORIC MOOD: 0
DIAPHORESIS: 0
BACK PAIN: 0
NERVOUS/ANXIOUS: 1
DIARRHEA: 0
SPEECH DIFFICULTY: 0
HEADACHES: 0
EYE DISCHARGE: 0
HALLUCINATIONS: 0
WEAKNESS: 0
PSYCHOMOTOR RETARDATION: 1
EYE REDNESS: 0
FLANK PAIN: 0
ABDOMINAL DISTENTION: 0
POLYDIPSIA: 0
WEAKNESS: 0
NECK PAIN: 0
SINUS PRESSURE: 0
CHILLS: 0
DEPRESSED MOOD: 1
AGITATION: 0
APPETITE CHANGE: 0
SLEEP DISTURBANCE: 0
MYALGIAS: 0
PSYCHOMOTOR RETARDATION: 1
NECK STIFFNESS: 0
EYE ITCHING: 0
DEPRESSED MOOD: 1
RHINORRHEA: 0
EYE REDNESS: 0
ARTHRALGIAS: 1
NUMBNESS: 0
SLEEP DISTURBANCE: 0
HALLUCINATIONS: 0
LIGHT-HEADEDNESS: 0
EYE PAIN: 0
SEIZURES: 0
DYSURIA: 0
EYE PAIN: 0
BLOOD IN STOOL: 0
VOMITING: 0
EYE ITCHING: 0
FATIGUE: 1
TROUBLE SWALLOWING: 0
HEMATURIA: 0
NUMBNESS: 0
DEPRESSION: 1
DIARRHEA: 0
FATIGUE: 1
DIAPHORESIS: 0
BLOOD IN STOOL: 0
SINUS PRESSURE: 0
FEVER: 0
BACK PAIN: 0
BRUISES/BLEEDS EASILY: 0
NECK PAIN: 0
ADENOPATHY: 0
RHINORRHEA: 0
DYSPHORIC MOOD: 0
CHEST TIGHTNESS: 0
JOINT SWELLING: 0
CONSTIPATION: 0
TREMORS: 0
FACIAL ASYMMETRY: 0
NECK STIFFNESS: 0
HEMATURIA: 0
EYE DISCHARGE: 0
PHOTOPHOBIA: 0
VOICE CHANGE: 0
CHEST TIGHTNESS: 0
WOUND: 0
MYALGIAS: 0
LIGHT-HEADEDNESS: 0
FACIAL ASYMMETRY: 0
NERVOUS/ANXIOUS: 1
WOUND: 0
UNEXPECTED WEIGHT CHANGE: 0
SEIZURES: 0
DEPRESSION: 1
PHOTOPHOBIA: 0
HEADACHES: 0
TREMORS: 0
JOINT SWELLING: 0
APPETITE CHANGE: 0
ACTIVITY CHANGE: 0
BRUISES/BLEEDS EASILY: 0
ADENOPATHY: 0
CONSTIPATION: 0
FREQUENCY: 0
FREQUENCY: 0
CHILLS: 0
SPEECH DIFFICULTY: 0
DYSURIA: 0
ARTHRALGIAS: 1
ACTIVITY CHANGE: 0
AGITATION: 0
VOMITING: 0
POLYDIPSIA: 0
FLANK PAIN: 0

## 2025-01-03 NOTE — PROGRESS NOTES
Subjective   Patient ID: Chyna Lorenzo is a 54 y.o. female who presents for Follow-up (Up the dosage of her Wegovy).    Is patient is evaluated today for follow-up on her anxiety and depression.  She reports significant improvement with fluoxetine to 10 mg she would like to increase the dosage she also needs refills on her Prempro.  She does have Wegovy filled locally through [facility] but she would like to increase the dosage on that as well.  Visit was switched from in person to virtual due to weather issues patient is physically located in the [facility].    Fatigue  This is a chronic problem. Associated symptoms include arthralgias and fatigue. Pertinent negatives include no chills, congestion, diaphoresis, fever, headaches, joint swelling, myalgias, neck pain, numbness, rash, vomiting or weakness.   Anxiety  Presents for follow-up visit. Symptoms include depressed mood, excessive worry and nervous/anxious behavior.       Depression  Visit Type: follow-up  Patient presents with the following symptoms: depressed mood, excessive worry, nervousness/anxiety and psychomotor retardation.          Review of Systems   Constitutional:  Positive for fatigue. Negative for activity change, appetite change, chills, diaphoresis, fever and unexpected weight change.   HENT:  Negative for congestion, ear pain, hearing loss, nosebleeds, postnasal drip, rhinorrhea, sinus pressure, sneezing, tinnitus, trouble swallowing and voice change.    Eyes:  Negative for photophobia, pain, discharge, redness, itching and visual disturbance.   Respiratory:  Negative for chest tightness.    Cardiovascular:  Negative for leg swelling.   Gastrointestinal:  Negative for abdominal distention, blood in stool, constipation, diarrhea and vomiting.   Endocrine: Negative for cold intolerance, heat intolerance, polydipsia and polyuria.   Genitourinary:  Negative for dysuria, flank pain, frequency, hematuria and urgency.   Musculoskeletal:  Positive for  "arthralgias and gait problem. Negative for back pain, joint swelling, myalgias, neck pain and neck stiffness.        Status post bilateral knee replacement   Skin:  Negative for rash and wound.   Allergic/Immunologic: Negative for immunocompromised state.   Neurological:  Negative for tremors, seizures, syncope, facial asymmetry, speech difficulty, weakness, light-headedness, numbness and headaches.   Hematological:  Negative for adenopathy. Does not bruise/bleed easily.   Psychiatric/Behavioral:  Positive for depression. Negative for agitation, behavioral problems, dysphoric mood, hallucinations, self-injury and sleep disturbance. The patient is nervous/anxious.        Objective   Ht 1.676 m (5' 6\")   Wt 84.8 kg (187 lb)   BMI 30.18 kg/m²     Physical Exam  Constitutional:       General: She is not in acute distress.     Appearance: Normal appearance.   HENT:      Head: Normocephalic and atraumatic.      Right Ear: External ear normal.      Left Ear: External ear normal.      Nose: Nose normal.   Eyes:      General: No scleral icterus.  Pulmonary:      Effort: Pulmonary effort is normal.   Skin:     Coloration: Skin is not jaundiced.      Findings: No rash.   Neurological:      General: No focal deficit present.      Mental Status: She is alert and oriented to person, place, and time.   Psychiatric:         Mood and Affect: Mood normal.         Behavior: Behavior normal.         Assessment/Plan   Diagnoses and all orders for this visit:  Anxiety  -     Follow Up In [facility] - PCP - Established  -     FLUoxetine (PROzac) 20 mg capsule; Take 1 capsule (20 mg) by mouth once daily.  Depression, unspecified depression type  -     Follow Up In [facility] - PCP - Established  -     FLUoxetine (PROzac) 20 mg capsule; Take 1 capsule (20 mg) by mouth once daily.  Class 1 obesity due to excess calories with serious comorbidity and body mass index (BMI) of 31.0 to 31.9 in adult  -     Follow Up In " [facility] - PCP - Established  -     semaglutide, weight loss, (Wegovy) 0.5 mg/0.5 mL pen injector; Inject 0.5 mg under the skin 1 (one) time per week for 48 doses.  Postmenopausal symptoms  -     conj estrog-medroxyprogest ace (Prempro) 0.625-2.5 mg tablet; Take 1 tablet by mouth once daily.    Keep next regularly scheduled follow-up appointment

## 2025-01-03 NOTE — PROGRESS NOTES
Subjective   Patient ID: Chyna Lorenzo is a 54 y.o. female who presents for No chief complaint on file..    Fatigue  This is a chronic problem. Associated symptoms include arthralgias and fatigue. Pertinent negatives include no chills, congestion, diaphoresis, fever, headaches, joint swelling, myalgias, neck pain, numbness, rash, vomiting or weakness.   Anxiety  Presents for follow-up visit. Symptoms include depressed mood, excessive worry and nervous/anxious behavior.       Depression  Visit Type: follow-up  Patient presents with the following symptoms: depressed mood, excessive worry, nervousness/anxiety and psychomotor retardation.          Review of Systems   Constitutional:  Positive for fatigue. Negative for activity change, appetite change, chills, diaphoresis, fever and unexpected weight change.   HENT:  Negative for congestion, ear pain, hearing loss, nosebleeds, postnasal drip, rhinorrhea, sinus pressure, sneezing, tinnitus, trouble swallowing and voice change.    Eyes:  Negative for photophobia, pain, discharge, redness, itching and visual disturbance.   Respiratory:  Negative for chest tightness.    Cardiovascular:  Negative for leg swelling.   Gastrointestinal:  Negative for abdominal distention, blood in stool, constipation, diarrhea and vomiting.   Endocrine: Negative for cold intolerance, heat intolerance, polydipsia and polyuria.   Genitourinary:  Negative for dysuria, flank pain, frequency, hematuria and urgency.   Musculoskeletal:  Positive for arthralgias and gait problem. Negative for back pain, joint swelling, myalgias, neck pain and neck stiffness.        Status post bilateral knee replacement   Skin:  Negative for rash and wound.   Allergic/Immunologic: Negative for immunocompromised state.   Neurological:  Negative for tremors, seizures, syncope, facial asymmetry, speech difficulty, weakness, light-headedness, numbness and headaches.   Hematological:  Negative for adenopathy. Does not  bruise/bleed easily.   Psychiatric/Behavioral:  Negative for agitation, behavioral problems, dysphoric mood, hallucinations, self-injury and sleep disturbance. The patient is nervous/anxious.        Objective   There were no vitals taken for this visit.    Physical Exam  Constitutional:       General: She is not in acute distress.     Appearance: She is not ill-appearing or diaphoretic.   HENT:      Head: Normocephalic and atraumatic.      Right Ear: External ear normal.      Left Ear: External ear normal.      Nose: Nose normal. No rhinorrhea.   Eyes:      General: Lids are normal. No scleral icterus.        Right eye: No discharge.         Left eye: No discharge.      Conjunctiva/sclera: Conjunctivae normal.   Cardiovascular:      Rate and Rhythm: Normal rate and regular rhythm.      Pulses: Normal pulses.      Heart sounds: No murmur heard.  Pulmonary:      Effort: Pulmonary effort is normal. No respiratory distress.      Breath sounds: No decreased breath sounds, wheezing, rhonchi or rales.   Abdominal:      General: Bowel sounds are normal. There is no distension.      Palpations: Abdomen is soft. There is no mass.      Tenderness: There is no abdominal tenderness. There is no guarding or rebound.   Musculoskeletal:         General: No swelling, tenderness or deformity.      Cervical back: No rigidity or tenderness.      Right lower leg: No edema.      Left lower leg: No edema.   Lymphadenopathy:      Cervical: No cervical adenopathy.      Upper Body:      Right upper body: No supraclavicular adenopathy.      Left upper body: No supraclavicular adenopathy.   Skin:     General: Skin is warm and dry.      Coloration: Skin is not jaundiced or pale.      Findings: No erythema, lesion or rash.   Neurological:      General: No focal deficit present.      Mental Status: She is alert and oriented to person, place, and time.      Sensory: No sensory deficit.      Motor: No weakness or tremor.      Coordination:  Coordination normal.      Gait: Gait normal.   Psychiatric:         Mood and Affect: Mood normal. Affect is not inappropriate.         Behavior: Behavior normal.         Assessment/Plan   There are no diagnoses linked to this encounter.

## 2025-01-09 ENCOUNTER — TELEPHONE (OUTPATIENT)
Dept: PRIMARY CARE | Facility: CLINIC | Age: 55
End: 2025-01-09
Payer: COMMERCIAL
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
## 2025-01-24 ENCOUNTER — TELEPHONE (OUTPATIENT)
Dept: PRIMARY CARE | Facility: CLINIC | Age: 55
End: 2025-01-24
Payer: COMMERCIAL

## 2025-01-27 ENCOUNTER — TELEPHONE (OUTPATIENT)
Dept: PRIMARY CARE | Facility: CLINIC | Age: 55
End: 2025-01-27
Payer: COMMERCIAL

## 2025-01-27 DIAGNOSIS — E66.811 CLASS 1 OBESITY DUE TO EXCESS CALORIES WITH SERIOUS COMORBIDITY AND BODY MASS INDEX (BMI) OF 31.0 TO 31.9 IN ADULT: Primary | ICD-10-CM

## 2025-01-27 DIAGNOSIS — E66.09 CLASS 1 OBESITY DUE TO EXCESS CALORIES WITH SERIOUS COMORBIDITY AND BODY MASS INDEX (BMI) OF 31.0 TO 31.9 IN ADULT: Primary | ICD-10-CM

## 2025-01-27 NOTE — TELEPHONE ENCOUNTER
LMOM for patient.  Patient has apt 02/04/25 for physical.  Patient not due until 08/2025 and already has August apt.

## 2025-01-28 RX ORDER — SEMAGLUTIDE 0.25 MG/.5ML
INJECTION, SOLUTION SUBCUTANEOUS
OUTPATIENT
Start: 2025-01-28

## 2025-01-31 PROCEDURE — G2214 INIT/SUB PSYCH CARE M 1ST 30: HCPCS

## 2025-02-04 ENCOUNTER — APPOINTMENT (OUTPATIENT)
Dept: PRIMARY CARE | Facility: CLINIC | Age: 55
End: 2025-02-04
Payer: COMMERCIAL

## 2025-02-04 VITALS
BODY MASS INDEX: 30.86 KG/M2 | HEART RATE: 78 BPM | SYSTOLIC BLOOD PRESSURE: 134 MMHG | DIASTOLIC BLOOD PRESSURE: 85 MMHG | OXYGEN SATURATION: 98 % | RESPIRATION RATE: 18 BRPM | TEMPERATURE: 97.2 F | WEIGHT: 192 LBS | HEIGHT: 66 IN

## 2025-02-04 DIAGNOSIS — M99.9 NONALLOPATHIC LESION OF CERVICOTHORACIC REGION: Primary | ICD-10-CM

## 2025-02-04 DIAGNOSIS — M22.2X2 PATELLOFEMORAL PAIN SYNDROME OF LEFT KNEE: ICD-10-CM

## 2025-02-04 DIAGNOSIS — M62.838 TRAPEZIUS MUSCLE SPASM: ICD-10-CM

## 2025-02-04 PROCEDURE — 3008F BODY MASS INDEX DOCD: CPT | Performed by: FAMILY MEDICINE

## 2025-02-04 PROCEDURE — 99213 OFFICE O/P EST LOW 20 MIN: CPT | Performed by: FAMILY MEDICINE

## 2025-02-04 PROCEDURE — 98925 OSTEOPATH MANJ 1-2 REGIONS: CPT | Performed by: FAMILY MEDICINE

## 2025-02-04 PROCEDURE — 1036F TOBACCO NON-USER: CPT | Performed by: FAMILY MEDICINE

## 2025-02-04 RX ORDER — TIZANIDINE 4 MG/1
4 TABLET ORAL EVERY 6 HOURS PRN
Qty: 30 TABLET | Refills: 0 | Status: SHIPPED | OUTPATIENT
Start: 2025-02-04 | End: 2025-02-14

## 2025-02-04 RX ORDER — MELOXICAM 15 MG/1
15 TABLET ORAL DAILY PRN
Qty: 90 TABLET | Refills: 3 | Status: SHIPPED | OUTPATIENT
Start: 2025-02-04 | End: 2026-02-04

## 2025-02-04 ASSESSMENT — ENCOUNTER SYMPTOMS
CHEST TIGHTNESS: 0
TROUBLE SWALLOWING: 0
EYE REDNESS: 0
ARTHRALGIAS: 0
ADENOPATHY: 0
NECK STIFFNESS: 0
HALLUCINATIONS: 0
NECK PAIN: 1
BRUISES/BLEEDS EASILY: 0
VOMITING: 0
COUGH: 0
MYALGIAS: 0
EYE DISCHARGE: 0
DIZZINESS: 0
ABDOMINAL PAIN: 0
TREMORS: 0
HEMATURIA: 0
FATIGUE: 0
PHOTOPHOBIA: 0
LIGHT-HEADEDNESS: 0
PALPITATIONS: 0
NAUSEA: 0
SLEEP DISTURBANCE: 0
JOINT SWELLING: 0
EYE PAIN: 0
NERVOUS/ANXIOUS: 0
WHEEZING: 0
FLANK PAIN: 0
SEIZURES: 0
NUMBNESS: 0
RHINORRHEA: 0
CONSTIPATION: 0
DYSURIA: 0
SHORTNESS OF BREATH: 0
ABDOMINAL DISTENTION: 0
POLYDIPSIA: 0
CHOKING: 0
SPEECH DIFFICULTY: 0
CHILLS: 0
FREQUENCY: 0
APPETITE CHANGE: 0
ACTIVITY CHANGE: 0
SINUS PRESSURE: 0
BACK PAIN: 0
SORE THROAT: 0
FEVER: 0
HEADACHES: 0
UNEXPECTED WEIGHT CHANGE: 0
TINGLING: 1
FACIAL ASYMMETRY: 0
DYSPHORIC MOOD: 0
CONFUSION: 0
VOICE CHANGE: 0
BLOOD IN STOOL: 0
DIARRHEA: 0
DIAPHORESIS: 0
AGITATION: 0
EYE ITCHING: 0
WOUND: 0
WEAKNESS: 0

## 2025-02-04 NOTE — PROGRESS NOTES
Subjective   Patient ID: Chyna Lorenzo is a 54 y.o. female who presents for Neck Pain (Left neck pain that travels into shoulder x 1 week/OTC cream no relief ).    Patient comes into the office today complaining of about 2-week history of left-sided neck pain she is uncertain what caused it but she feels that it might have been a sleep position.  She describes the pain as an aching cramping and she has been utilizing NSAIDs with slight improvement in symptomatology.  She denies any weakness but she does have some tingling down the left upper arm but no weakness.  She is right-handed.  She denies any nausea, vomiting, diarrhea, fever, chills, shortness of breath or chest pain    Neck Pain   This is a new problem. The current episode started 1 to 4 weeks ago. The problem occurs constantly. The problem has been waxing and waning. The pain is associated with an unknown factor. The pain is present in the left side. The quality of the pain is described as aching and cramping. The pain is moderate. Nothing aggravates the symptoms. The pain is Same all the time. Stiffness is present All day. Associated symptoms include tingling (Down left arm but not into the hand). Pertinent negatives include no chest pain, fever, headaches, numbness, photophobia, trouble swallowing or weakness. She has tried NSAIDs for the symptoms. The treatment provided mild relief.       Review of Systems   Constitutional:  Negative for activity change, appetite change, chills, diaphoresis, fatigue, fever and unexpected weight change.   HENT:  Negative for congestion, ear pain, hearing loss, nosebleeds, postnasal drip, rhinorrhea, sinus pressure, sneezing, sore throat, tinnitus, trouble swallowing and voice change.    Eyes:  Negative for photophobia, pain, discharge, redness, itching and visual disturbance.   Respiratory:  Negative for cough, choking, chest tightness, shortness of breath and wheezing.    Cardiovascular:  Negative for chest pain,  "palpitations and leg swelling.   Gastrointestinal:  Negative for abdominal distention, abdominal pain, blood in stool, constipation, diarrhea, nausea and vomiting.   Endocrine: Negative for cold intolerance, heat intolerance, polydipsia and polyuria.   Genitourinary:  Negative for dysuria, flank pain, frequency, hematuria and urgency.   Musculoskeletal:  Positive for neck pain. Negative for arthralgias, back pain, joint swelling, myalgias and neck stiffness.   Skin:  Negative for rash and wound.   Allergic/Immunologic: Negative for immunocompromised state.   Neurological:  Positive for tingling (Down left arm but not into the hand). Negative for dizziness, tremors, seizures, syncope, facial asymmetry, speech difficulty, weakness, light-headedness, numbness and headaches.   Hematological:  Negative for adenopathy. Does not bruise/bleed easily.   Psychiatric/Behavioral:  Negative for agitation, behavioral problems, confusion, dysphoric mood, hallucinations, self-injury, sleep disturbance and suicidal ideas. The patient is not nervous/anxious.        Objective   /85 (BP Location: Right arm, Patient Position: Sitting, BP Cuff Size: Large adult)   Pulse 78   Temp 36.2 °C (97.2 °F) (Temporal)   Resp 18   Ht 1.676 m (5' 6\")   Wt 87.1 kg (192 lb)   SpO2 98%   BMI 30.99 kg/m²     Physical Exam  Constitutional:       General: She is not in acute distress.     Appearance: She is not ill-appearing or diaphoretic.   HENT:      Head: Normocephalic and atraumatic.      Right Ear: External ear normal.      Left Ear: External ear normal.      Nose: Nose normal. No rhinorrhea.   Eyes:      General: Lids are normal. No scleral icterus.        Right eye: No discharge.         Left eye: No discharge.      Conjunctiva/sclera: Conjunctivae normal.   Cardiovascular:      Rate and Rhythm: Regular rhythm.      Pulses: Normal pulses.      Heart sounds: No murmur heard.  Pulmonary:      Effort: Pulmonary effort is normal. No " respiratory distress.      Breath sounds: No decreased breath sounds, wheezing, rhonchi or rales.   Abdominal:      General: Bowel sounds are normal. There is no distension.      Palpations: Abdomen is soft. There is no mass.      Tenderness: There is no abdominal tenderness. There is no guarding or rebound.   Musculoskeletal:         General: No swelling, tenderness or deformity.      Cervical back: No rigidity or tenderness.      Right lower leg: No edema.      Left lower leg: No edema.      Comments: Left trapezius and levator spasm and tenderness to palpation decreased range of motion in flexion and extension in upper thoracic area.  C7 rotated left side bent right   Lymphadenopathy:      Cervical: No cervical adenopathy.      Upper Body:      Right upper body: No supraclavicular adenopathy.      Left upper body: No supraclavicular adenopathy.   Skin:     General: Skin is warm and dry.      Coloration: Skin is not jaundiced or pale.      Findings: No erythema, lesion or rash.   Neurological:      General: No focal deficit present.      Mental Status: She is alert and oriented to person, place, and time.      Sensory: No sensory deficit.      Motor: No weakness or tremor.      Coordination: Coordination normal.      Gait: Gait normal.   Psychiatric:         Mood and Affect: Mood normal. Affect is not inappropriate.         Behavior: Behavior normal.         Assessment/Plan   Diagnoses and all orders for this visit:  Nonallopathic lesion of cervicothoracic region  -     Follow Up In Primary Care - Health Maintenance  -     Osteopathic Manipulation  Patellofemoral pain syndrome of left knee  -     Follow Up In [facility] Care - Health Maintenance  -     meloxicam (Mobic) 15 mg tablet; Take 1 tablet (15 mg) by mouth once daily as needed for mild pain (1 - 3).  Trapezius muscle spasm  -     Follow Up In [facility] - Health Maintenance  -     tiZANidine (Zanaflex) 4 mg tablet; Take 1 tablet (4 mg) by mouth every 6  hours if needed for muscle spasms for up to 10 days.  OMT soft tissue and HVLA performed in thoracic area and myofascial release performed in parascapular musculature with approximately 50% resolution of symptomatology upon reevaluation flexion and extension restrictions were resolved in thoracic spine and lower cervical dysfunction had also resolved.  Patient tolerated well.  Keep next regular scheduled follow-up appointment

## 2025-02-06 ENCOUNTER — DOCUMENTATION (OUTPATIENT)
Dept: PRIMARY CARE | Facility: CLINIC | Age: 55
End: 2025-02-06

## 2025-02-06 ENCOUNTER — DOCUMENTATION (OUTPATIENT)
Dept: PRIMARY CARE | Facility: CLINIC | Age: 55
End: 2025-02-06
Payer: COMMERCIAL

## 2025-02-06 DIAGNOSIS — F32.1 CURRENT MODERATE EPISODE OF MAJOR DEPRESSIVE DISORDER WITHOUT PRIOR EPISODE (MULTI): Primary | ICD-10-CM

## 2025-02-06 NOTE — PROGRESS NOTES
Writer has outreached pt in an attempt to reengaged in [facility]. Due to a lack of response to outreach, pt is being closed from [facility] this time. If additional support needs arise, pt can be re referred to program.

## 2025-03-12 ENCOUNTER — APPOINTMENT (OUTPATIENT)
Dept: NEUROLOGY | Facility: CLINIC | Age: 55
End: 2025-03-12
Payer: COMMERCIAL

## 2025-03-12 VITALS
BODY MASS INDEX: 30.78 KG/M2 | SYSTOLIC BLOOD PRESSURE: 130 MMHG | HEIGHT: 67 IN | WEIGHT: 196.1 LBS | DIASTOLIC BLOOD PRESSURE: 90 MMHG | HEART RATE: 82 BPM | TEMPERATURE: 97.4 F

## 2025-03-12 DIAGNOSIS — R41.3 MEMORY LOSS: ICD-10-CM

## 2025-03-12 PROCEDURE — 3008F BODY MASS INDEX DOCD: CPT | Performed by: STUDENT IN AN ORGANIZED HEALTH CARE EDUCATION/TRAINING PROGRAM

## 2025-03-12 PROCEDURE — 1036F TOBACCO NON-USER: CPT | Performed by: STUDENT IN AN ORGANIZED HEALTH CARE EDUCATION/TRAINING PROGRAM

## 2025-03-12 PROCEDURE — 99214 OFFICE O/P EST MOD 30 MIN: CPT | Performed by: STUDENT IN AN ORGANIZED HEALTH CARE EDUCATION/TRAINING PROGRAM

## 2025-03-12 ASSESSMENT — VISUAL ACUITY: VA_NORMAL: 1

## 2025-03-12 ASSESSMENT — LIFESTYLE VARIABLES
AUDIT-C TOTAL SCORE: 0
HOW OFTEN DO YOU HAVE A DRINK CONTAINING ALCOHOL: NEVER
SKIP TO QUESTIONS 9-10: 1
HOW OFTEN DO YOU HAVE SIX OR MORE DRINKS ON ONE OCCASION: NEVER
HOW MANY STANDARD DRINKS CONTAINING ALCOHOL DO YOU HAVE ON A TYPICAL DAY: PATIENT DOES NOT DRINK

## 2025-03-12 ASSESSMENT — MINI MENTAL STATE EXAM: SUM ALL MMSE QUESTIONS FOR TOTAL SCORE [OUT OF 30].: 27
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
## 2025-03-15 DIAGNOSIS — F41.9 ANXIETY: Primary | ICD-10-CM

## 2025-03-18 RX ORDER — FLUOXETINE 10 MG/1
10 CAPSULE ORAL DAILY
Qty: 90 CAPSULE | Refills: 1 | Status: SHIPPED | OUTPATIENT
Start: 2025-03-18

## 2025-03-20 ENCOUNTER — APPOINTMENT (OUTPATIENT)
Dept: NEUROLOGY | Facility: CLINIC | Age: 55
End: 2025-03-20
Payer: COMMERCIAL

## 2025-03-21 ENCOUNTER — TELEPHONE (OUTPATIENT)
Dept: PRIMARY CARE | Facility: CLINIC | Age: 55
End: 2025-03-21
Payer: COMMERCIAL

## 2025-03-21 NOTE — TELEPHONE ENCOUNTER
Pt called in today to have a refill of her med Primpro,I do not see this on patients list.Please advise and call pt back.[phone].  Pt would like this sent to [facility] in [place].   17

## 2025-03-25 DIAGNOSIS — N95.9 POSTMENOPAUSAL SYMPTOMS: Primary | ICD-10-CM

## 2025-03-25 RX ORDER — CONJUGATED ESTROGENS AND MEDROXYPROGESTERONE ACETATE .625; 2.5 MG/1; MG/1
1 TABLET, SUGAR COATED ORAL DAILY
Qty: 90 TABLET | Refills: 2 | Status: SHIPPED | OUTPATIENT
Start: 2025-03-25

## 2025-04-24 ENCOUNTER — OFFICE VISIT (OUTPATIENT)
Dept: PRIMARY CARE | Facility: CLINIC | Age: 55
End: 2025-04-24
Payer: COMMERCIAL

## 2025-04-24 VITALS
DIASTOLIC BLOOD PRESSURE: 80 MMHG | OXYGEN SATURATION: 99 % | WEIGHT: 197 LBS | BODY MASS INDEX: 30.85 KG/M2 | HEART RATE: 58 BPM | TEMPERATURE: 98.7 F | RESPIRATION RATE: 16 BRPM | SYSTOLIC BLOOD PRESSURE: 124 MMHG

## 2025-04-24 DIAGNOSIS — J02.9 SORE THROAT: Primary | ICD-10-CM

## 2025-04-24 DIAGNOSIS — R05.1 ACUTE COUGH: ICD-10-CM

## 2025-04-24 LAB
POC RAPID INFLUENZA A: NEGATIVE
POC RAPID INFLUENZA B: NEGATIVE
POC RAPID STREP: NEGATIVE
POC SARS-COV-2 AG BINAX: NORMAL

## 2025-04-24 PROCEDURE — 99214 OFFICE O/P EST MOD 30 MIN: CPT | Performed by: FAMILY MEDICINE

## 2025-04-24 PROCEDURE — 87811 SARS-COV-2 COVID19 W/OPTIC: CPT | Performed by: FAMILY MEDICINE

## 2025-04-24 PROCEDURE — 87880 STREP A ASSAY W/OPTIC: CPT | Performed by: FAMILY MEDICINE

## 2025-04-24 PROCEDURE — 1036F TOBACCO NON-USER: CPT | Performed by: FAMILY MEDICINE

## 2025-04-24 PROCEDURE — 87804 INFLUENZA ASSAY W/OPTIC: CPT | Performed by: FAMILY MEDICINE

## 2025-04-24 RX ORDER — METHYLPREDNISOLONE 4 MG/1
TABLET ORAL
Qty: 21 TABLET | Refills: 0 | Status: SHIPPED | OUTPATIENT
Start: 2025-04-24 | End: 2025-04-30

## 2025-04-24 RX ORDER — BENZONATATE 100 MG/1
100 CAPSULE ORAL 3 TIMES DAILY PRN
Qty: 42 CAPSULE | Refills: 0 | Status: SHIPPED | OUTPATIENT
Start: 2025-04-24 | End: 2025-05-24

## 2025-04-24 ASSESSMENT — ENCOUNTER SYMPTOMS
COUGH: 1
DIFFICULTY URINATING: 0
WHEEZING: 0
HEADACHES: 1
FEVER: 0
VOMITING: 0
DIARRHEA: 0
MYALGIAS: 1
NAUSEA: 0
FATIGUE: 1
RHINORRHEA: 1
SORE THROAT: 1

## 2025-04-24 NOTE — ASSESSMENT & PLAN NOTE
Advise pt rst, rft and covid were negative  Will send pcr and tx as needed  Pt to take meds as directed  Rest, increase flds, may use otc tylenol or motrin , mucinex, claritin and flonase daily  F/up if no improvement  Go to ER if any resp distress

## 2025-04-24 NOTE — PROGRESS NOTES
Subjective   Patient ID: Chyna Lorenzo is a 54 y.o. female who presents for Cough.    PT swabbed for rapid covid, flu, strep and pcrs.    Cough  Episode onset: 3 days. The problem has been gradually worsening. Associated symptoms include headaches, myalgias, nasal congestion, rhinorrhea and a sore throat. Pertinent negatives include no ear pain, fever or wheezing.        Review of Systems   Constitutional:  Positive for fatigue. Negative for fever.        Resp symptoms, st x 3 de  No family ill at home   HENT:  Positive for congestion, rhinorrhea and sore throat. Negative for ear discharge and ear pain.    Respiratory:  Positive for cough. Negative for wheezing.    Gastrointestinal:  Negative for diarrhea, nausea and vomiting.   Genitourinary:  Negative for difficulty urinating.   Musculoskeletal:  Positive for myalgias.   Neurological:  Positive for headaches.       Objective   /80   Pulse 58   Temp 37.1 °C (98.7 °F)   Resp 16   Wt 89.4 kg (197 lb)   SpO2 99%   BMI 30.85 kg/m²     Physical Exam  Vitals and nursing note reviewed.   Constitutional:       General: She is not in acute distress.     Appearance: Normal appearance.   HENT:      Head: Normocephalic and atraumatic.      Right Ear: Tympanic membrane, ear canal and external ear normal.      Left Ear: Tympanic membrane, ear canal and external ear normal.      Nose: Congestion present.      Mouth/Throat:      Mouth: Mucous membranes are moist.      Pharynx: Posterior oropharyngeal erythema present.   Cardiovascular:      Rate and Rhythm: Normal rate and regular rhythm.      Heart sounds: Normal heart sounds.   Pulmonary:      Effort: Pulmonary effort is normal.      Breath sounds: Normal breath sounds.   Musculoskeletal:      Cervical back: Neck supple.   Lymphadenopathy:      Cervical: No cervical adenopathy.   Skin:     General: Skin is warm and dry.   Neurological:      Mental Status: She is alert.         Assessment/Plan   Problem List Items  Addressed This Visit           ICD-10-CM    Acute cough R05.1    Advise pt rst, rft and covid were negative  Will send pcr and tx as needed  Pt to take meds as directed  Rest, increase flds, may use otc tylenol or motrin , mucinex, claritin and flonase daily  F/up if no improvement  Go to ER if any resp distress         Relevant Medications    benzonatate (Tessalon) 100 mg capsule    Other Relevant Orders    POCT Rapid Strep A manually resulted (Completed)    POCT Influenza A/B manually resulted (Completed)    POCT BinaxNOW Covid-19 Ag Card manually resulted (Completed)    Group A Streptococcus, PCR    Sars-CoV-2 and Influenza A/B PCR     Other Visit Diagnoses         Codes      Sore throat    -  Primary J02.9    Relevant Medications    methylPREDNISolone (Medrol Dospak) 4 mg tablets

## 2025-04-25 LAB
FLUAV RNA RESP QL NAA+PROBE: NOT DETECTED
FLUBV RNA RESP QL NAA+PROBE: NOT DETECTED
S PYO DNA THROAT QL NAA+PROBE: NOT DETECTED
SARS-COV-2 RNA RESP QL NAA+PROBE: NOT DETECTED

## 2025-06-03 ENCOUNTER — TELEPHONE (OUTPATIENT)
Dept: PRIMARY CARE | Facility: CLINIC | Age: 55
End: 2025-06-03
Payer: COMMERCIAL

## 2025-06-03 DIAGNOSIS — F40.243 FEAR OF FLYING: Primary | ICD-10-CM

## 2025-06-03 RX ORDER — ALPRAZOLAM 0.5 MG/1
0.5 TABLET ORAL 3 TIMES DAILY PRN
Qty: 8 TABLET | Refills: 0 | Status: SHIPPED | OUTPATIENT
Start: 2025-06-03

## 2025-06-16 ENCOUNTER — TELEPHONE (OUTPATIENT)
Dept: PRIMARY CARE | Facility: CLINIC | Age: 55
End: 2025-06-16
Payer: COMMERCIAL

## 2025-06-23 ENCOUNTER — DOCUMENTATION (OUTPATIENT)
Dept: PHARMACY | Facility: HOSPITAL | Age: 55
End: 2025-06-23
Payer: COMMERCIAL

## 2025-06-23 NOTE — PROGRESS NOTES
06/23/25    Number contacted: [phone]  Referring Provider: Cody Eisenberg DO  Referral Reason: Weight Loss    Reached out to Chyna Lorenzo to discuss denial of Wegovy prior authorization. PA was denied due to lack of clinical documentation showing patient has lost at least 5% of body weight from baseline within 60 days of PA submission     Spoke to patient and confirmed that her starting weight was 203 pounds prior to starting Wegovy in January 2023. Patient confirms weight loss of 23 pounds since then and is currently weighing 180 pounds as of 6/20/25 with Wegovy, showing weight loss of 11.33% from baseline.    Will submit appeal to insurance company and update patient as able.     Thank you,  Ashly Glez, PharmD  Clinical Pharmacist

## 2025-07-11 ENCOUNTER — TELEPHONE (OUTPATIENT)
Dept: PRIMARY CARE | Facility: CLINIC | Age: 55
End: 2025-07-11
Payer: COMMERCIAL

## 2025-08-12 ENCOUNTER — APPOINTMENT (OUTPATIENT)
Dept: PRIMARY CARE | Facility: CLINIC | Age: 55
End: 2025-08-12
Payer: COMMERCIAL

## 2025-08-12 VITALS
DIASTOLIC BLOOD PRESSURE: 81 MMHG | HEIGHT: 67 IN | OXYGEN SATURATION: 96 % | BODY MASS INDEX: 30.93 KG/M2 | HEART RATE: 77 BPM | RESPIRATION RATE: 16 BRPM | WEIGHT: 197.1 LBS | SYSTOLIC BLOOD PRESSURE: 126 MMHG | TEMPERATURE: 98.3 F

## 2025-08-12 DIAGNOSIS — M22.2X2 PATELLOFEMORAL PAIN SYNDROME OF LEFT KNEE: ICD-10-CM

## 2025-08-12 DIAGNOSIS — Z12.11 ENCOUNTER FOR SCREENING FOR MALIGNANT NEOPLASM OF COLON: ICD-10-CM

## 2025-08-12 DIAGNOSIS — N95.9 POSTMENOPAUSAL SYMPTOMS: ICD-10-CM

## 2025-08-12 DIAGNOSIS — E66.811 CLASS 1 OBESITY DUE TO EXCESS CALORIES WITH SERIOUS COMORBIDITY AND BODY MASS INDEX (BMI) OF 30.0 TO 30.9 IN ADULT: ICD-10-CM

## 2025-08-12 DIAGNOSIS — K58.1 IRRITABLE BOWEL SYNDROME WITH CONSTIPATION: ICD-10-CM

## 2025-08-12 DIAGNOSIS — Z12.31 ENCOUNTER FOR SCREENING MAMMOGRAM FOR MALIGNANT NEOPLASM OF BREAST: ICD-10-CM

## 2025-08-12 DIAGNOSIS — F41.9 ANXIETY: ICD-10-CM

## 2025-08-12 DIAGNOSIS — E55.9 VITAMIN D DEFICIENCY: ICD-10-CM

## 2025-08-12 DIAGNOSIS — Z00.00 ROUTINE GENERAL MEDICAL EXAMINATION AT A HEALTH CARE FACILITY: Primary | ICD-10-CM

## 2025-08-12 DIAGNOSIS — E53.8 B12 DEFICIENCY: ICD-10-CM

## 2025-08-12 DIAGNOSIS — E66.09 CLASS 1 OBESITY DUE TO EXCESS CALORIES WITH SERIOUS COMORBIDITY AND BODY MASS INDEX (BMI) OF 30.0 TO 30.9 IN ADULT: ICD-10-CM

## 2025-08-12 DIAGNOSIS — K21.9 GASTROESOPHAGEAL REFLUX DISEASE, UNSPECIFIED WHETHER ESOPHAGITIS PRESENT: ICD-10-CM

## 2025-08-12 PROCEDURE — 99396 PREV VISIT EST AGE 40-64: CPT | Performed by: FAMILY MEDICINE

## 2025-08-12 PROCEDURE — 1036F TOBACCO NON-USER: CPT | Performed by: FAMILY MEDICINE

## 2025-08-12 PROCEDURE — 3008F BODY MASS INDEX DOCD: CPT | Performed by: FAMILY MEDICINE

## 2025-08-12 RX ORDER — SEMAGLUTIDE 0.5 MG/.5ML
0.5 INJECTION, SOLUTION SUBCUTANEOUS WEEKLY
Qty: 2 ML | Refills: 11 | Status: SHIPPED | OUTPATIENT
Start: 2025-08-12 | End: 2026-07-08

## 2025-08-12 RX ORDER — MELOXICAM 15 MG/1
15 TABLET ORAL DAILY PRN
Qty: 90 TABLET | Refills: 3 | Status: SHIPPED | OUTPATIENT
Start: 2025-08-12 | End: 2026-08-12

## 2025-08-12 RX ORDER — PANTOPRAZOLE SODIUM 20 MG/1
20 TABLET, DELAYED RELEASE ORAL DAILY
Qty: 90 TABLET | Refills: 3 | Status: SHIPPED | OUTPATIENT
Start: 2025-08-12 | End: 2026-08-12

## 2025-08-12 RX ORDER — PROGESTERONE 100 MG/1
CAPSULE ORAL
COMMUNITY
End: 2025-08-12 | Stop reason: SDUPTHER

## 2025-08-12 RX ORDER — ESTRADIOL 1 MG/1
1 TABLET ORAL DAILY
COMMUNITY
End: 2025-08-12 | Stop reason: SDUPTHER

## 2025-08-12 RX ORDER — ESTRADIOL 1 MG/1
1 TABLET ORAL DAILY
Qty: 90 TABLET | Refills: 3 | Status: SHIPPED | OUTPATIENT
Start: 2025-08-12 | End: 2026-08-12

## 2025-08-12 RX ORDER — CONJUGATED ESTROGENS AND MEDROXYPROGESTERONE ACETATE .625; 2.5 MG/1; MG/1
1 TABLET, SUGAR COATED ORAL DAILY
Qty: 90 TABLET | Refills: 3 | Status: SHIPPED | OUTPATIENT
Start: 2025-08-12

## 2025-08-12 RX ORDER — PROGESTERONE 100 MG/1
100 CAPSULE ORAL DAILY
Qty: 90 CAPSULE | Refills: 3 | Status: SHIPPED | OUTPATIENT
Start: 2025-08-12 | End: 2026-08-12

## 2025-08-12 RX ORDER — FLUOXETINE 20 MG/1
40 CAPSULE ORAL DAILY
Qty: 180 CAPSULE | Refills: 3 | Status: SHIPPED | OUTPATIENT
Start: 2025-08-12 | End: 2026-08-12

## 2025-08-12 ASSESSMENT — ENCOUNTER SYMPTOMS
BACK PAIN: 0
BRUISES/BLEEDS EASILY: 0
SORE THROAT: 0
DYSPHORIC MOOD: 0
EYE ITCHING: 0
WHEEZING: 0
ACTIVITY CHANGE: 0
FACIAL ASYMMETRY: 0
JOINT SWELLING: 0
EYE REDNESS: 0
VOMITING: 0
HEMATURIA: 0
COUGH: 0
WEAKNESS: 0
VOICE CHANGE: 0
EYE DISCHARGE: 0
CONFUSION: 0
NERVOUS/ANXIOUS: 0
DIZZINESS: 0
HALLUCINATIONS: 0
DIARRHEA: 0
PHOTOPHOBIA: 0
FATIGUE: 0
RHINORRHEA: 0
ABDOMINAL PAIN: 0
FLANK PAIN: 0
NECK STIFFNESS: 0
CHILLS: 0
SPEECH DIFFICULTY: 0
APPETITE CHANGE: 0
UNEXPECTED WEIGHT CHANGE: 0
ADENOPATHY: 0
ABDOMINAL DISTENTION: 0
HEADACHES: 0
SHORTNESS OF BREATH: 0
FREQUENCY: 0
WOUND: 0
PALPITATIONS: 0
TREMORS: 0
NAUSEA: 0
CONSTIPATION: 0
AGITATION: 0
LIGHT-HEADEDNESS: 0
NECK PAIN: 0
BLOOD IN STOOL: 0
POLYDIPSIA: 0
DYSURIA: 0
NUMBNESS: 0
EYE PAIN: 0
CHOKING: 0
FEVER: 0
MYALGIAS: 0
DIAPHORESIS: 0
SINUS PRESSURE: 0
SLEEP DISTURBANCE: 0
SEIZURES: 0
CHEST TIGHTNESS: 0
TROUBLE SWALLOWING: 0

## 2025-11-04 ENCOUNTER — APPOINTMENT (OUTPATIENT)
Dept: NEUROLOGY | Facility: CLINIC | Age: 55
End: 2025-11-04
Payer: COMMERCIAL

## (undated) DEVICE — DRAPE, SHEET, THREE QUARTER, FAN FOLD, 57 X 77 IN

## (undated) DEVICE — BLADE, PATELLA REAMER, W/PILOT HOLE, 35MM

## (undated) DEVICE — COVER, MAYO STAND, W/PAD, 23 IN, DISPOSABLE, PLASTIC, LF, STERILE

## (undated) DEVICE — BLADE, GEN COATED 2.75, LF

## (undated) DEVICE — Device

## (undated) DEVICE — TUBING, IRRIGATION, HIGH FLOW, HAND PIECE SET

## (undated) DEVICE — CUFF, TOURNIQUET, DUAL PORT, SNG BLADDER, 30 IN, PLC

## (undated) DEVICE — TOWELS 4-PK

## (undated) DEVICE — BLADE, STRYKER, 12 X 76 X 1.00L, TOOTH

## (undated) DEVICE — IMMOBILIZER, KNEE, 19IN, ADJUSTABLE FOAM, W/ ELASTIC STRAPS

## (undated) DEVICE — SYRINGE, 50 CC, LUER LOCK

## (undated) DEVICE — PREP, IODOPHOR, W/ALCOHOL, DURAPREP, W/APPLICATOR, 26 CC

## (undated) DEVICE — STRAP, ARM BOARD, 32 X 1.5

## (undated) DEVICE — ADHESIVE, SKIN, LIQUIBAND EXCEED

## (undated) DEVICE — SUTURE, VICRYL, 1, 36 IN, V-34, VIOLET

## (undated) DEVICE — SUTURE, MONOCRYL, 4-0, 27 IN, PS-2, UNDYED

## (undated) DEVICE — PADDING, CAST, SPECIALIST, 6 IN X 4 YD, STERILE

## (undated) DEVICE — SOLUTION, IRRIGATION, USP, SODIUM CHLORIDE 0.9%, 3000 ML

## (undated) DEVICE — WRAP, DEMAYO, LEG, STERILE

## (undated) DEVICE — GLOVE, SURGICAL, PROTEXIS PI BLUE W/NEUTHERA, 8.0, PF, LF

## (undated) DEVICE — BANDAGE, ELASTIC, 6 X 10YD, BEIGE, LF

## (undated) DEVICE — SOLUTION, IRRIGATION, STERILE WATER, 1000 ML, POUR BOTTLE

## (undated) DEVICE — BATH BLANKET STERILE

## (undated) DEVICE — MAT, FLOOR, STANDARD FLUID BARRIER, 32X44, GREEN

## (undated) DEVICE — NEEDLE, SAFETY, 18 G X 1.5 IN

## (undated) DEVICE — WOUND SYSTEM, DEBRIDEMENT & CLEANING, O.R DUOPAK

## (undated) DEVICE — SUTURE, VICRYL 2-0, TAPER POINT, CT-1 UNDYED 27 INCH

## (undated) DEVICE — BLADE, STRYKER, OSC, 19 X 90 X 1.27G

## (undated) DEVICE — GLOVE, SURGICAL, PROTEXIS PI , 7.5, PF, LF

## (undated) DEVICE — GOWN, SURGICAL, ROYAL SILK, XL, STERILE

## (undated) DEVICE — HOOD, SURGICAL, FLYTE, T7

## (undated) DEVICE — DRESSING, MEPILEX BORDER, POST-OP AG, 4 X 12 IN

## (undated) DEVICE — STRAP, VELCRO, BODY, 4 X 60IN, NS

## (undated) DEVICE — MANIFOLD, 4 PORT NEPTUNE STANDARD

## (undated) DEVICE — CEMENT, MIXEVAC III, 10S BOWL, KNEES